# Patient Record
Sex: FEMALE | Race: WHITE | NOT HISPANIC OR LATINO | Employment: OTHER | URBAN - METROPOLITAN AREA
[De-identification: names, ages, dates, MRNs, and addresses within clinical notes are randomized per-mention and may not be internally consistent; named-entity substitution may affect disease eponyms.]

---

## 2017-11-29 ENCOUNTER — RESULTS ENCOUNTER (OUTPATIENT)
Dept: FAMILY MEDICINE CLINIC | Facility: CLINIC | Age: 67
End: 2017-11-29

## 2017-11-29 ENCOUNTER — LAB (OUTPATIENT)
Dept: LAB | Facility: HOSPITAL | Age: 67
End: 2017-11-29

## 2017-11-29 ENCOUNTER — OFFICE VISIT (OUTPATIENT)
Dept: FAMILY MEDICINE CLINIC | Facility: CLINIC | Age: 67
End: 2017-11-29

## 2017-11-29 VITALS
WEIGHT: 162.4 LBS | BODY MASS INDEX: 24.61 KG/M2 | SYSTOLIC BLOOD PRESSURE: 124 MMHG | OXYGEN SATURATION: 96 % | HEIGHT: 68 IN | TEMPERATURE: 97.8 F | HEART RATE: 80 BPM | DIASTOLIC BLOOD PRESSURE: 78 MMHG

## 2017-11-29 DIAGNOSIS — E55.9 VITAMIN D DEFICIENCY: ICD-10-CM

## 2017-11-29 DIAGNOSIS — R73.09 ELEVATED GLUCOSE: Primary | ICD-10-CM

## 2017-11-29 DIAGNOSIS — M25.542 ARTHRALGIA OF BOTH HANDS: ICD-10-CM

## 2017-11-29 DIAGNOSIS — L81.9 PIGMENTED SKIN LESIONS: ICD-10-CM

## 2017-11-29 DIAGNOSIS — Z12.11 SCREENING FOR COLON CANCER: ICD-10-CM

## 2017-11-29 DIAGNOSIS — M25.541 ARTHRALGIA OF BOTH HANDS: ICD-10-CM

## 2017-11-29 DIAGNOSIS — Z00.00 MEDICARE ANNUAL WELLNESS VISIT, SUBSEQUENT: Primary | ICD-10-CM

## 2017-11-29 DIAGNOSIS — Z00.00 MEDICARE ANNUAL WELLNESS VISIT, SUBSEQUENT: ICD-10-CM

## 2017-11-29 DIAGNOSIS — G47.00 INSOMNIA, UNSPECIFIED TYPE: ICD-10-CM

## 2017-11-29 LAB
25(OH)D3 SERPL-MCNC: 35.7 NG/ML
ALBUMIN SERPL-MCNC: 4.7 G/DL (ref 3.2–4.8)
ALBUMIN/GLOB SERPL: 2 G/DL (ref 1.5–2.5)
ALP SERPL-CCNC: 77 U/L (ref 25–100)
ALT SERPL W P-5'-P-CCNC: 23 U/L (ref 7–40)
ANION GAP SERPL CALCULATED.3IONS-SCNC: 8 MMOL/L (ref 3–11)
ARTICHOKE IGE QN: 159 MG/DL (ref 0–130)
AST SERPL-CCNC: 18 U/L (ref 0–33)
BACTERIA UR QL AUTO: ABNORMAL /HPF
BASOPHILS # BLD AUTO: 0.02 10*3/MM3 (ref 0–0.2)
BASOPHILS NFR BLD AUTO: 0.2 % (ref 0–1)
BILIRUB SERPL-MCNC: 1 MG/DL (ref 0.3–1.2)
BILIRUB UR QL STRIP: NEGATIVE
BUN BLD-MCNC: 14 MG/DL (ref 9–23)
BUN/CREAT SERPL: 17.5 (ref 7–25)
CALCIUM SPEC-SCNC: 9.8 MG/DL (ref 8.7–10.4)
CHLORIDE SERPL-SCNC: 101 MMOL/L (ref 99–109)
CHOLEST SERPL-MCNC: 222 MG/DL (ref 0–200)
CLARITY UR: ABNORMAL
CO2 SERPL-SCNC: 29 MMOL/L (ref 20–31)
COLOR UR: YELLOW
CREAT BLD-MCNC: 0.8 MG/DL (ref 0.6–1.3)
CRP SERPL-MCNC: 0.67 MG/DL (ref 0–1)
DEPRECATED RDW RBC AUTO: 43.2 FL (ref 37–54)
EOSINOPHIL # BLD AUTO: 0.08 10*3/MM3 (ref 0–0.3)
EOSINOPHIL NFR BLD AUTO: 0.8 % (ref 0–3)
ERYTHROCYTE [DISTWIDTH] IN BLOOD BY AUTOMATED COUNT: 12.9 % (ref 11.3–14.5)
GFR SERPL CREATININE-BSD FRML MDRD: 71 ML/MIN/1.73
GLOBULIN UR ELPH-MCNC: 2.3 GM/DL
GLUCOSE BLD-MCNC: 113 MG/DL (ref 70–100)
GLUCOSE UR STRIP-MCNC: NEGATIVE MG/DL
HBA1C MFR BLD: 6.4 % (ref 4.8–5.6)
HCT VFR BLD AUTO: 44.5 % (ref 34.5–44)
HCV AB SER DONR QL: NORMAL
HDLC SERPL-MCNC: 58 MG/DL (ref 40–60)
HEMOCCULT STL QL IA: NEGATIVE
HGB BLD-MCNC: 14.7 G/DL (ref 11.5–15.5)
HGB UR QL STRIP.AUTO: NEGATIVE
HYALINE CASTS UR QL AUTO: ABNORMAL /LPF
IMM GRANULOCYTES # BLD: 0.02 10*3/MM3 (ref 0–0.03)
IMM GRANULOCYTES NFR BLD: 0.2 % (ref 0–0.6)
KETONES UR QL STRIP: NEGATIVE
LEUKOCYTE ESTERASE UR QL STRIP.AUTO: NEGATIVE
LYMPHOCYTES # BLD AUTO: 2.46 10*3/MM3 (ref 0.6–4.8)
LYMPHOCYTES NFR BLD AUTO: 25.4 % (ref 24–44)
MCH RBC QN AUTO: 30.2 PG (ref 27–31)
MCHC RBC AUTO-ENTMCNC: 33 G/DL (ref 32–36)
MCV RBC AUTO: 91.6 FL (ref 80–99)
MONOCYTES # BLD AUTO: 0.74 10*3/MM3 (ref 0–1)
MONOCYTES NFR BLD AUTO: 7.6 % (ref 0–12)
NEUTROPHILS # BLD AUTO: 6.38 10*3/MM3 (ref 1.5–8.3)
NEUTROPHILS NFR BLD AUTO: 65.8 % (ref 41–71)
NITRITE UR QL STRIP: NEGATIVE
PH UR STRIP.AUTO: <=5 [PH] (ref 5–8)
PLATELET # BLD AUTO: 227 10*3/MM3 (ref 150–450)
PMV BLD AUTO: 9.8 FL (ref 6–12)
POTASSIUM BLD-SCNC: 4.7 MMOL/L (ref 3.5–5.5)
PROT SERPL-MCNC: 7 G/DL (ref 5.7–8.2)
PROT UR QL STRIP: NEGATIVE
RBC # BLD AUTO: 4.86 10*6/MM3 (ref 3.89–5.14)
RBC # UR: ABNORMAL /HPF
REF LAB TEST METHOD: ABNORMAL
RHEUMATOID FACT SERPL-ACNC: NEGATIVE [IU]/ML
SODIUM BLD-SCNC: 138 MMOL/L (ref 132–146)
SP GR UR STRIP: 1.02 (ref 1–1.03)
SQUAMOUS #/AREA URNS HPF: ABNORMAL /HPF
TRIGL SERPL-MCNC: 151 MG/DL (ref 0–150)
TSH SERPL DL<=0.05 MIU/L-ACNC: 1.68 MIU/ML (ref 0.35–5.35)
UROBILINOGEN UR QL STRIP: ABNORMAL
WBC NRBC COR # BLD: 9.7 10*3/MM3 (ref 3.5–10.8)
WBC UR QL AUTO: ABNORMAL /HPF

## 2017-11-29 PROCEDURE — 82306 VITAMIN D 25 HYDROXY: CPT

## 2017-11-29 PROCEDURE — 86038 ANTINUCLEAR ANTIBODIES: CPT

## 2017-11-29 PROCEDURE — 86140 C-REACTIVE PROTEIN: CPT

## 2017-11-29 PROCEDURE — 81001 URINALYSIS AUTO W/SCOPE: CPT

## 2017-11-29 PROCEDURE — 80053 COMPREHEN METABOLIC PANEL: CPT

## 2017-11-29 PROCEDURE — 83036 HEMOGLOBIN GLYCOSYLATED A1C: CPT

## 2017-11-29 PROCEDURE — G0439 PPPS, SUBSEQ VISIT: HCPCS | Performed by: NURSE PRACTITIONER

## 2017-11-29 PROCEDURE — 85025 COMPLETE CBC W/AUTO DIFF WBC: CPT

## 2017-11-29 PROCEDURE — 86431 RHEUMATOID FACTOR QUANT: CPT

## 2017-11-29 PROCEDURE — 86803 HEPATITIS C AB TEST: CPT

## 2017-11-29 PROCEDURE — 84443 ASSAY THYROID STIM HORMONE: CPT

## 2017-11-29 PROCEDURE — 80061 LIPID PANEL: CPT

## 2017-11-29 PROCEDURE — 82270 OCCULT BLOOD FECES: CPT | Performed by: NURSE PRACTITIONER

## 2017-11-29 PROCEDURE — 36415 COLL VENOUS BLD VENIPUNCTURE: CPT

## 2017-11-29 RX ORDER — TRAMADOL HYDROCHLORIDE 50 MG/1
50 TABLET ORAL AS NEEDED
COMMUNITY
End: 2020-06-04

## 2017-11-29 RX ORDER — SODIUM PHOSPHATE,MONO-DIBASIC 19G-7G/118
1 ENEMA (ML) RECTAL DAILY
COMMUNITY
End: 2018-07-13

## 2017-11-29 RX ORDER — SULINDAC 150 MG/1
150 TABLET ORAL AS NEEDED
COMMUNITY
End: 2019-01-15

## 2017-11-29 RX ORDER — CYCLOBENZAPRINE HCL 10 MG
10 TABLET ORAL AS NEEDED
COMMUNITY
End: 2020-06-04

## 2017-11-29 RX ORDER — ZOLPIDEM TARTRATE 10 MG/1
10 TABLET ORAL NIGHTLY PRN
Qty: 30 TABLET | Refills: 0 | Status: SHIPPED | OUTPATIENT
Start: 2017-11-29 | End: 2017-12-22 | Stop reason: SDUPTHER

## 2017-11-29 NOTE — PROGRESS NOTES
QUICK REFERENCE INFORMATION:  The ABCs of the Annual Wellness Visit    Subsequent Medicare Wellness Visit    HEALTH RISK ASSESSMENT    1950    Recent Hospitalizations:  No hospitalization(s) within the last year..        Current Medical Providers:  Patient Care Team:  YANDEL Pineda as PCP - General (Nurse Practitioner)  Camden Franco MD (Neurosurgery)  Jason Pino MD as Consulting Physician (Pulmonary Disease)  Fady Lane MD as Consulting Physician (Otolaryngology)        Smoking Status:  History   Smoking Status   • Never Smoker   Smokeless Tobacco   • Never Used       Alcohol Consumption:  History   Alcohol Use   • Yes     Comment: 2-3 drinks a month       Depression Screen:   PHQ-2/PHQ-9 Depression Screening 11/29/2017   Little interest or pleasure in doing things 0   Feeling down, depressed, or hopeless 1   Total Score 1    Patient states the depression is not debilitating, is an ongoing problem, denies need for medication, is not interested in therapy, she stays active.    Health Habits and Functional and Cognitive Screening:  Functional & Cognitive Status 11/29/2017   Do you have difficulty preparing food and eating? No   Do you have difficulty bathing yourself, getting dressed or grooming yourself? No   Do you have difficulty using the toilet? No   Do you have difficulty moving around from place to place? No   Do you have trouble with steps or getting out of a bed or a chair? No   In the past year have you fallen or experienced a near fall? Yes   Current Diet Well Balanced Diet   Dental Exam Up to date   Eye Exam Up to date   Exercise (times per week) 5 times per week   Current Exercise Activities Include Swimming   Do you need help using the phone?  No   Are you deaf or do you have serious difficulty hearing?  Yes   Do you need help with transportation? No   Do you need help shopping? No   Do you need help preparing meals?  No   Do you need help with housework?   No   Do you need help with laundry? No   Do you need help taking your medications? No   Do you need help managing money? No   Have you felt unusual stress, anger or loneliness in the last month? No   Who do you live with? Spouse   If you need help, do you have trouble finding someone available to you? No   Have you been bothered in the last four weeks by sexual problems? No   Do you have difficulty concentrating, remembering or making decisions? No           Does the patient have evidence of cognitive impairment? No    Aspirin use counseling: Does not need ASA (and currently is not on it)      Recent Lab Results:  CMP:     Lipid Panel:     HbA1c:       Visual Acuity:  No exam data present    Age-appropriate Screening Schedule:  Refer to the list below for future screening recommendations based on patient's age, sex and/or medical conditions. Orders for these recommended tests are listed in the plan section. The patient has been provided with a written plan.    Health Maintenance   Topic Date Due   • COLONOSCOPY  11/29/2017   • MAMMOGRAM  01/01/2018   • TDAP/TD VACCINES (2 - Td) 01/01/2024   • INFLUENZA VACCINE  Completed   • PNEUMOCOCCAL VACCINES (65+ LOW/MEDIUM RISK)  Completed   • ZOSTER VACCINE  Completed        Subjective   History of Present Illness    Dixie Cervantes is a 67 y.o. female who presents for an Subsequent Wellness Visit.  Patient does not want a colonoscopy scheduled today, will be in [place] until March, will call back at that time, she is willing to do a cologuard. She does have some skin lesions she would like to have checked.  Patient has been taking ambien for 5-6 years off and on, has been taking more regularly over past year, needs a refill today.  Patient has noticed an increase in finger joint and hand pain, has a family history of rheumatoid arthritis and would like to be screened because it is interfering with her ability to adelina.   The following portions of the patient's history were  reviewed and updated as appropriate: allergies, current medications, past family history, past medical history, past social history, past surgical history and problem list.    Outpatient Medications Prior to Visit   Medication Sig Dispense Refill   • zolpidem (AMBIEN) 10 MG tablet        No facility-administered medications prior to visit.        There is no problem list on file for this patient.      Advance Care Planning:  has an advance directive - a copy HAS NOT been provided. Have asked the patient to send this to us to add to record.    Identification of Risk Factors:  Risk factors include: depression and polypharmacy.    Review of Systems   Constitutional: Negative for activity change, appetite change, chills, diaphoresis, fatigue, fever and unexpected weight change.   HENT: Positive for ear pain (right ear) and hearing loss (slight). Negative for sore throat and tinnitus.    Eyes: Negative for photophobia, pain, discharge, redness and visual disturbance.   Respiratory: Positive for cough (chronic). Negative for shortness of breath and wheezing.    Cardiovascular: Negative for chest pain, palpitations and leg swelling.   Gastrointestinal: Negative for abdominal pain, blood in stool, constipation, diarrhea, nausea and vomiting.   Endocrine: Negative for cold intolerance, heat intolerance, polydipsia and polyuria.   Genitourinary: Positive for frequency and urgency. Negative for difficulty urinating, dysuria, vaginal bleeding, vaginal discharge and vaginal pain.        Feels like urine stream has weakened   Musculoskeletal: Positive for arthralgias and back pain (chronic). Negative for joint swelling, neck pain and neck stiffness.   Skin: Negative for pallor, rash and wound.        Bridge of nose, left cheek brown macules   Allergic/Immunologic: Positive for environmental allergies.   Neurological: Positive for dizziness (when ear problems, uses meclizine), light-headedness and numbness (occ in left 5 th finger  tip, has seen specialist). Negative for tremors, syncope, weakness and headaches.   Psychiatric/Behavioral: Positive for sleep disturbance. Negative for dysphoric mood, self-injury and suicidal ideas. The patient is nervous/anxious.        Compared to one year ago, the patient feels her physical health is worse.  Compared to one year ago, the patient feels her mental health is the same.    Objective     Physical Exam   Constitutional: She is oriented to person, place, and time. She appears well-developed and well-nourished.   HENT:   Head: Normocephalic and atraumatic.   Right Ear: External ear normal. Tympanic membrane is not erythematous and not bulging. A middle ear effusion is present.   Left Ear: External ear normal. Tympanic membrane is not erythematous and not bulging. A middle ear effusion is present.   Nose: Nose normal.   Mouth/Throat: Uvula is midline and oropharynx is clear and moist. Normal dentition. No oropharyngeal exudate.   Eyes: Conjunctivae and EOM are normal. Pupils are equal, round, and reactive to light. Right eye exhibits no discharge. Left eye exhibits no discharge. No scleral icterus.   Neck: Normal range of motion. Neck supple. No JVD present. No tracheal deviation present. No thyromegaly present.   Cardiovascular: Normal rate, regular rhythm, normal heart sounds and intact distal pulses.  Exam reveals no friction rub.    No murmur heard.  No carotid bruit   Pulmonary/Chest: Effort normal and breath sounds normal. No stridor. No respiratory distress. She has no wheezes. She has no rales. Right breast exhibits no inverted nipple, no mass, no nipple discharge, no skin change and no tenderness. Left breast exhibits no inverted nipple, no mass, no nipple discharge, no skin change and no tenderness. Breasts are symmetrical.   Abdominal: Soft. Bowel sounds are normal. She exhibits no distension and no mass. There is no rebound and no guarding. No hernia.   No aortic or renal artery bruit  "  Genitourinary:   Genitourinary Comments: Pelvic declined   Musculoskeletal: She exhibits no edema, tenderness or deformity.   Normal ROM of major joints   Lymphadenopathy:     She has no cervical adenopathy.   Neurological: She is alert and oriented to person, place, and time. She has normal reflexes. No cranial nerve deficit.   Skin: Skin is warm and dry. No rash noted. No pallor.   brown macule on bridge of nose(0.6 cm) and 0.4 cm on left cheek, 0.5 cm flesh colored papule on right shoulder   Vitals reviewed.      Vitals:    11/29/17 0821   BP: 124/78   Pulse: 80   Temp: 97.8 °F (36.6 °C)   TempSrc: Temporal Artery    SpO2: 96%   Weight: 162 lb 6.4 oz (73.7 kg)   Height: 68\" (172.7 cm)   PainSc: 0-No pain       Body mass index is 24.69 kg/(m^2).  Discussed the patient's BMI with her. The BMI is in the acceptable range.    Assessment/Plan   Patient Self-Management and Personalized Health Advice  The patient has been provided with information about: diet, exercise and mental health concerns and preventive services including:   · Colorectal cancer screening, fecal occult blood test, Colorectal cancer screening, cologuard test ordered.    Visit Diagnoses:    ICD-10-CM ICD-9-CM   1. Medicare annual wellness visit, subsequent Z00.00 V70.0   2. Arthralgia of both hands M25.541 719.44    M25.542    3. Vitamin D deficiency E55.9 268.9   4. Pigmented skin lesions L81.9 709.00   5. Insomnia, unspecified type G47.00 780.52   6. Screening for colon cancer Z12.11 V76.51       No orders of the defined types were placed in this encounter.  I will contact patient regarding test results and provide instructions regarding any necessary changes in plan of care.Nutrition and activity goals reviewed including: mainly water to drink, limit white flour/processed sugar, and processed foods, choose fresh fruits, vegetables, fish, lean meats,high fiber carbs, exercise  working toward 150 mins cardio per week, weight training " 2x/week.  Patient to notify if urinary symptoms worsen, may need referral to urology for evaluation    Outpatient Encounter Prescriptions as of 11/29/2017   Medication Sig Dispense Refill   • cyclobenzaprine (FLEXERIL) 10 MG tablet Take 10 mg by mouth As Needed for Muscle Spasms.     • glucosamine-chondroitin 500-400 MG capsule capsule Take 1 capsule by mouth Daily.     • Multiple Vitamins-Minerals (MULTI VITAMIN/MINERALS) tablet Take 1 tablet by mouth.     • sulindac (CLINORIL) 150 MG tablet Take 150 mg by mouth As Needed for Mild Pain .     • traMADol (ULTRAM) 50 MG tablet Take 50 mg by mouth As Needed for Moderate Pain .     • zolpidem (AMBIEN) 10 MG tablet        No facility-administered encounter medications on file as of 11/29/2017.        Reviewed use of high risk medication in the elderly: yes  Reviewed for potential of harmful drug interactions in the elderly: yes    Follow Up:  No Follow-up on file.     An After Visit Summary and PPPS with all of these plans were given to the patient.

## 2017-11-30 LAB — ANA SER QL: NEGATIVE

## 2017-12-18 ENCOUNTER — TELEPHONE (OUTPATIENT)
Dept: FAMILY MEDICINE CLINIC | Facility: CLINIC | Age: 67
End: 2017-12-18

## 2017-12-22 ENCOUNTER — OFFICE VISIT (OUTPATIENT)
Dept: FAMILY MEDICINE CLINIC | Facility: CLINIC | Age: 67
End: 2017-12-22

## 2017-12-22 VITALS
HEART RATE: 94 BPM | SYSTOLIC BLOOD PRESSURE: 118 MMHG | BODY MASS INDEX: 24.55 KG/M2 | HEIGHT: 68 IN | TEMPERATURE: 97.8 F | OXYGEN SATURATION: 98 % | WEIGHT: 162 LBS | DIASTOLIC BLOOD PRESSURE: 78 MMHG

## 2017-12-22 DIAGNOSIS — G47.00 INSOMNIA, UNSPECIFIED TYPE: ICD-10-CM

## 2017-12-22 DIAGNOSIS — R73.03 PREDIABETES: Primary | ICD-10-CM

## 2017-12-22 PROCEDURE — 99213 OFFICE O/P EST LOW 20 MIN: CPT | Performed by: NURSE PRACTITIONER

## 2017-12-22 RX ORDER — ZOLPIDEM TARTRATE 10 MG/1
10 TABLET ORAL NIGHTLY PRN
Qty: 30 TABLET | Refills: 1 | Status: SHIPPED | OUTPATIENT
Start: 2017-12-22 | End: 2018-01-21

## 2017-12-22 NOTE — PATIENT INSTRUCTIONS
Cholesterol  Cholesterol is a fat. Your body needs a small amount of cholesterol. Cholesterol may build up in your blood vessels. This increases your chance of having a heart attack or stroke.  You cannot feel your cholesterol levels. The only way to know your cholesterol level is high is with a blood test. Keep your test results. Work with your doctor to keep your cholesterol at a good level.  WHAT DO THE TEST RESULTS MEAN?  · Total cholesterol is how much cholesterol is in your blood.  · LDL is bad cholesterol. This is the type that can build up. You want LDL to be low.  · HDL is good cholesterol. It cleans your blood vessels and carries LDL away. You want HDL to be high.  · Triglycerides are fat that the body can burn for energy or store.  WHAT ARE GOOD LEVELS OF CHOLESTEROL?  · Total cholesterol below 200.  · LDL below 100 for people at risk. Below 70 for those at very high risk.  · HDL above 50 is good. Above 60 is best.  · Triglycerides below 150.  HOW CAN I LOWER MY CHOLESTEROL?  · Diet. Follow your diet programs as told by your doctor.    Choose fish, white meat chicken, roasted turkey, or baked turkey. Try not to eat red meat, fried foods, or processed meats such as sausage and lunch meats.    Eat lots of fresh fruits and vegetables.    Choose whole grains, beans, pasta, potatoes, and cereals.    Use only small amounts of olive, corn, or canola oils.    Try not to eat butter, mayonnaise, shortening, or palm kernel oils.    Try not to eat foods with trans fats.    Drink skim or nonfat milk. Eat low-fat or nonfat yogurt and cheeses. Try not to drink whole milk or cream. Try not to eat ice cream, egg yolks, and full-fat cheeses.    Healthy desserts include kevin food cake, maurizio snaps, animal crackers, hard candy, popsicles, and low-fat or nonfat frozen yogurt. Try not to eat pastries, cakes, pies, and cookies.  · Exercise. Follow your exercise programs as told by your doctor.    Be more active. You can try  "gardening, walking, or taking the stairs. Ask your doctor about how you can be more active.  · Medicine. Take medicine as told by your doctor.     This information is not intended to replace advice given to you by your health care provider. Make sure you discuss any questions you have with your health care provider.     Document Released: 03/16/2010 Document Revised: 01/08/2016 Document Reviewed: 10/01/2014  [employer] Interactive Patient Education ©2017 Elsevier Inc.  Food Choices to Lower Your Triglycerides  Triglycerides are a type of fat in your blood. High levels of triglycerides can increase the risk of heart disease and stroke. If your triglyceride levels are high, the foods you eat and your eating habits are very important. Choosing the right foods can help lower your triglycerides.   WHAT GENERAL GUIDELINES DO I NEED TO FOLLOW?  · Lose weight if you are overweight.    · Limit or avoid alcohol.    · Fill one half of your plate with vegetables and green salads.    · Limit fruit to two servings a day. Choose fruit instead of juice.    · Make one fourth of your plate whole grains. Look for the word \"whole\" as the first word in the ingredient list.  · Fill one fourth of your plate with lean protein foods.  · Enjoy fatty fish (such as salmon, mackerel, sardines, and tuna) three times a week.    · Choose healthy fats.    · Limit foods high in starch and sugar.  · Eat more home-cooked food and less restaurant, buffet, and fast food.  · Limit fried foods.  · Cook foods using methods other than frying.  · Limit saturated fats.  · Check ingredient lists to avoid foods with partially hydrogenated oils (trans fats) in them.  WHAT FOODS CAN I EAT?   Grains  Whole grains, such as whole wheat or whole grain breads, crackers, cereals, and pasta. Unsweetened oatmeal, bulgur, barley, quinoa, or brown rice. Corn or whole wheat flour tortillas.   Vegetables  Fresh or frozen vegetables (raw, steamed, roasted, or grilled). Green " salads.  Fruits  All fresh, canned (in natural juice), or frozen fruits.  Meat and Other Protein Products  Ground beef (85% or leaner), grass-fed beef, or beef trimmed of fat. Skinless chicken or turkey. Ground chicken or turkey. Pork trimmed of fat. All fish and seafood. Eggs. Dried beans, peas, or lentils. Unsalted nuts or seeds. Unsalted canned or dry beans.  Dairy  Low-fat dairy products, such as skim or 1% milk, 2% or reduced-fat cheeses, low-fat ricotta or cottage cheese, or plain low-fat yogurt.  Fats and Oils  Tub margarines without trans fats. Light or reduced-fat mayonnaise and salad dressings. Avocado. Safflower, olive, or canola oils. Natural peanut or almond butter.  The items listed above may not be a complete list of recommended foods or beverages. Contact your dietitian for more options.  WHAT FOODS ARE NOT RECOMMENDED?   Grains  White bread. White pasta. White rice. Cornbread. Bagels, pastries, and croissants. Crackers that contain trans fat.  Vegetables  White potatoes. Corn. Creamed or fried vegetables. Vegetables in a cheese sauce.  Fruits  Dried fruits. Canned fruit in light or heavy syrup. Fruit juice.  Meat and Other Protein Products  Fatty cuts of meat. Ribs, chicken wings, ibarra, sausage, bologna, salami, chitterlings, fatback, hot dogs, bratwurst, and packaged luncheon meats.  Dairy  Whole or 2% milk, cream, half-and-half, and cream cheese. Whole-fat or sweetened yogurt. Full-fat cheeses. Nondairy creamers and whipped toppings. Processed cheese, cheese spreads, or cheese curds.  Sweets and Desserts  Corn syrup, sugars, honey, and molasses. Candy. Jam and jelly. Syrup. Sweetened cereals. Cookies, pies, cakes, donuts, muffins, and ice cream.  Fats and Oils  Butter, stick margarine, lard, shortening, ghee, or ibarra fat. Coconut, palm kernel, or palm oils.  Beverages  Alcohol. Sweetened drinks (such as sodas, lemonade, and fruit drinks or punches).  The items listed above may not be a complete  list of foods and beverages to avoid. Contact your dietitian for more information.     This information is not intended to replace advice given to you by your health care provider. Make sure you discuss any questions you have with your health care provider.     Document Released: 10/05/2005 Document Revised: 01/08/2016 Document Reviewed: 10/22/2014  [employer] Interactive Patient Education ©2017 [employer]vier Inc.  Prediabetes Eating Plan  Prediabetes--also called impaired glucose tolerance or impaired fasting glucose--is a condition that causes blood sugar (blood glucose) levels to be higher than normal. Following a healthy diet can help to keep prediabetes under control. It can also help to lower the risk of type 2 diabetes and heart disease, which are increased in people who have prediabetes. Along with regular exercise, a healthy diet:  · Promotes weight loss.  · Helps to control blood sugar levels.  · Helps to improve the way that the body uses insulin.  WHAT DO I NEED TO KNOW ABOUT THIS EATING PLAN?  · Use the glycemic index (GI) to plan your meals. The index tells you how quickly a food will raise your blood sugar. Choose low-GI foods. These foods take a longer time to raise blood sugar.  · Pay close attention to the amount of carbohydrates in the food that you eat. Carbohydrates increase blood sugar levels.  · Keep track of how many calories you take in. Eating the right amount of calories will help you to achieve a healthy weight. Losing about 7 percent of your starting weight can help to prevent type 2 diabetes.  · You may want to follow a Mediterranean diet. This diet includes a lot of vegetables, lean meats or fish, whole grains, fruits, and healthy oils and fats.  WHAT FOODS CAN I EAT?  Grains  Whole grains, such as whole-wheat or whole-grain breads, crackers, cereals, and pasta. Unsweetened oatmeal. Bulgur. Barley. Quinoa. Brown rice. Corn or whole-wheat flour tortillas or taco shells.  Vegetables  Lettuce.  Spinach. Peas. Beets. Cauliflower. Cabbage. Broccoli. Carrots. Tomatoes. Squash. Eggplant. Herbs. Peppers. Onions. Cucumbers. [place] sprouts.  Fruits  Berries. Bananas. Apples. Oranges. Grapes. Papaya. [place]. Pomegranate. Kiwi. Grapefruit. Cherries.  Meats and Other Protein Sources  Seafood. Lean meats, such as chicken and turkey or lean cuts of pork and beef. Tofu. Eggs. Nuts. Beans.  Dairy  Low-fat or fat-free dairy products, such as yogurt, cottage cheese, and cheese.  Beverages  Water. Tea. Coffee. Sugar-free or diet soda. [place] water. Milk. Milk alternatives, such as soy or almond milk.  Condiments  Mustard. Relish. Low-fat, low-sugar ketchup. Low-fat, low-sugar barbecue sauce. Low-fat or fat-free mayonnaise.  Sweets and Desserts  Sugar-free or low-fat pudding. Sugar-free or low-fat ice cream and other frozen treats.  Fats and Oils  Avocado. Walnuts. Olive oil.  The items listed above may not be a complete list of recommended foods or beverages. Contact your dietitian for more options.   WHAT FOODS ARE NOT RECOMMENDED?  Grains  Refined white flour and flour products, such as bread, pasta, snack foods, and cereals.  Beverages  Sweetened drinks, such as sweet iced tea and soda.  Sweets and Desserts  Baked goods, such as cake, cupcakes, pastries, cookies, and cheesecake.  The items listed above may not be a complete list of foods and beverages to avoid. Contact your dietitian for more information.     This information is not intended to replace advice given to you by your health care provider. Make sure you discuss any questions you have with your health care provider.     Document Released: 05/03/2016 Document Reviewed: 05/03/2016  [employer] Interactive Patient Education ©2017 [employer].    Prediabetes  WHAT IS PREDIABETES?  Prediabetes is the condition of having a blood sugar (blood glucose) level that is higher than it should be, but not high enough for you to be diagnosed with type 2 diabetes. Having  prediabetes puts you at risk for developing type 2 diabetes (type 2 diabetes mellitus). Prediabetes may be called impaired glucose tolerance or impaired fasting glucose.  Prediabetes usually does not cause symptoms. Your health care provider can diagnose this condition with blood tests. You may be tested for prediabetes if you are overweight and if you have at least one other risk factor for prediabetes.  Risk factors for prediabetes include:  · Having a family member with type 2 diabetes.  · Being overweight or obese.  · Being older than age 45.  · Being of American-[place], -American, /, or / descent.  · Having an inactive (sedentary) lifestyle.  · Having a history of gestational diabetes or polycystic ovarian syndrome (PCOS).  · Having low levels of good cholesterol (HDL-C) or high levels of blood fats (triglycerides).  · Having high blood pressure.  WHAT IS BLOOD GLUCOSE AND HOW IS BLOOD GLUCOSE MEASURED?  Blood glucose refers to the amount of glucose in your bloodstream. Glucose comes from eating foods that contain sugars and starches (carbohydrates) that the body breaks down into glucose. Your blood glucose level may be measured in mg/dL (milligrams per deciliter) or mmol/L (millimoles per liter). Your blood glucose may be checked with one or more of the following blood tests:  · A fasting blood glucose (FBG) test. You will not be allowed to eat (you will fast) for at least 8 hours before a blood sample is taken.    A normal range for FBG is  mg/dl (3.9-5.6 mmol/L).  · An A1c (hemoglobin A1c) blood test. This test provides information about blood glucose control over the previous 2-3 months.  · An oral glucose tolerance test (OGTT). This test measures your blood glucose twice:    After fasting. This is your baseline level.    Two hours after you drink a beverage that contains glucose.  You may be diagnosed with prediabetes:  · If your FBG is 100-125 mg/dL (5.6-6.9  mmol/L).  · If your A1c level is 5.7-6.4%.  · If your OGGT result is 140-199 mg/dL (7.8-11 mmol/L).  These blood tests may be repeated to confirm your diagnosis.  WHAT HAPPENS IF BLOOD GLUCOSE IS TOO HIGH?  The pancreas produces a hormone (insulin) that helps move glucose from the bloodstream into cells. When cells in the body do not respond properly to insulin that the body makes (insulin resistance), excess glucose builds up in the blood instead of going into cells. As a result, high blood glucose (hyperglycemia) can develop, which can cause many complications. This is a symptom of prediabetes.  WHAT CAN HAPPEN IF BLOOD GLUCOSE STAYS HIGHER THAN NORMAL FOR A LONG TIME?  Having high blood glucose for a long time is dangerous. Too much glucose in your blood can damage your nerves and blood vessels. Long-term damage can lead to complications from diabetes, which may include:  · Heart disease.  · Stroke.  · Blindness.  · Kidney disease.  · Depression.  · Poor circulation in the feet and legs, which could lead to surgical removal (amputation) in severe cases.  HOW CAN PREDIABETES BE PREVENTED FROM TURNING INTO TYPE 2 DIABETES?  To help prevent type 2 diabetes, take the following actions:  · Be physically active.    Do moderate-intensity physical activity for at least 30 minutes on at least 5 days of the week, or as much as told by your health care provider. This could be brisk walking, biking, or water aerobics.    Ask your health care provider what activities are safe for you. A mix of physical activities may be best, such as walking, swimming, cycling, and strength training.  · Lose weight as told by your health care provider.    Losing 5-7% of your body weight can reverse insulin resistance.    Your health care provider can determine how much weight loss is best for you and can help you lose weight safely.  · Follow a healthy meal plan. This includes eating lean proteins, complex carbohydrates, fresh fruits and  vegetables, low-fat dairy products, and healthy fats.    Follow instructions from your health care provider about eating or drinking restrictions.    Make an appointment to see a diet and nutrition specialist (registered dietitian) to help you create a healthy eating plan that is right for you.  · Do not smoke or use any tobacco products, such as cigarettes, chewing tobacco, and e-cigarettes. If you need help quitting, ask your health care provider.  · Take over-the-counter and prescription medicines as told by your health care provider. You may be prescribed medicines that help lower the risk of type 2 diabetes.     This information is not intended to replace advice given to you by your health care provider. Make sure you discuss any questions you have with your health care provider.     Document Released: 04/10/2017 Document Reviewed: 02/07/2017  Elsevier Interactive Patient Education ©2017 Elsevier Inc.

## 2017-12-22 NOTE — PROGRESS NOTES
Subjective   Dixie Cervantes is a 67 y.o. female.   Chief Complaint   Patient presents with   • insomnia       History of Present Illness   Patient is here for refills of Ambien, is leaving for [place] for the winter,and will need a refill.  Patient also wants to go over lab results, had a negative cologuard, but after reading the kit information is concerned about the 13% false negative results and wants to have this repeated next year if possible.Cholesterol and triglycerides are elevated, she does not want to start a statin, A1c was 6.4, does not want to go to Diabetes education, will try to improve diet and follow up in a few months when she gets back from [place].  Patient does walk the treadmill and swim for exercise, states she had eaten poorly the months prior to blood work.  The following portions of the patient's history were reviewed and updated as appropriate: allergies, current medications, past family history, past medical history, past social history, past surgical history and problem list.    Review of Systems   Constitutional: Negative for chills and fever.   Eyes: Negative for visual disturbance.   Respiratory: Negative for shortness of breath and wheezing.    Cardiovascular: Negative for chest pain and palpitations.   Endocrine: Negative for polydipsia and polyuria.   Genitourinary: Negative for difficulty urinating and dysuria.   Allergic/Immunologic: Positive for environmental allergies.   Neurological: Positive for dizziness (occ) and headaches.   Psychiatric/Behavioral: Positive for sleep disturbance. The patient is nervous/anxious.        Objective   Physical Exam   Constitutional: She is oriented to person, place, and time. She appears well-developed and well-nourished. No distress.   HENT:   Head: Normocephalic and atraumatic.   Right Ear: External ear normal.   Left Ear: External ear normal.   Nose: Nose normal.   Mouth/Throat: Oropharynx is clear and moist. No oropharyngeal exudate.   Eyes:  Conjunctivae are normal. No scleral icterus.   Cardiovascular: Normal rate, regular rhythm, normal heart sounds and intact distal pulses.    No murmur heard.  Pulmonary/Chest: Effort normal and breath sounds normal. No respiratory distress. She has no wheezes.   Neurological: She is alert and oriented to person, place, and time.   Skin: Skin is warm and dry. She is not diaphoretic.   Psychiatric: She has a normal mood and affect. Her behavior is normal. Judgment and thought content normal.   Vitals reviewed.      Assessment/Plan   Dixie was seen today for hypertension.    Diagnoses and all orders for this visit:    Prediabetes    Insomnia, unspecified type  -     zolpidem (AMBIEN) 10 MG tablet; Take 1 tablet by mouth At Night As Needed for Sleep for up to 30 days.        I provided patient with printed material about prediabetes and diabetes prevention, discussed the importance of diet and exercise, patient will follow up in a few months, will recheck A1c, I did again recommend a diabetes education referral when she gets back.   Nitin Lan refilled Ambien, MADY reviewed, patient signed controlled substance agreement last month.  I discussed patient's lab results with her,  Cologuard is a fairly new test, current repeat recommendations are every 3 years, but intervals are uncertain per the literature.  Patient was encouraged to keep me informed of any acute changes, lack of improvement, or any new concerning symptoms.Patient voiced understanding of all instructions and denied further questions.

## 2018-01-15 ENCOUNTER — TELEPHONE (OUTPATIENT)
Dept: FAMILY MEDICINE CLINIC | Facility: CLINIC | Age: 68
End: 2018-01-15

## 2018-01-15 NOTE — TELEPHONE ENCOUNTER
ZOLPIDEM 10 MG TAKES 1 PER DAY FOR SLEEP, 30 DAY SUPPLY WITH 2 REFILLS    PHARMACY NEEDS TO VERIFY THIS SCRIPT AS IT WAS DATED 12/22/17    [facility] [place]

## 2018-03-23 ENCOUNTER — TELEPHONE (OUTPATIENT)
Dept: FAMILY MEDICINE CLINIC | Facility: CLINIC | Age: 68
End: 2018-03-23

## 2018-03-23 DIAGNOSIS — E78.49 OTHER HYPERLIPIDEMIA: Primary | ICD-10-CM

## 2018-03-23 DIAGNOSIS — R73.09 ELEVATED GLUCOSE: ICD-10-CM

## 2018-03-23 NOTE — TELEPHONE ENCOUNTER
"PT WOULD LIKE TO HAVE LABS PUT IN SYSTEM BEFORE HER APPT ON 4-9-18 AT 9:45. PER PT SHE IS TO RE DO SOME OF HER LABS . PT DIDN'T KNOW WHAT LABS [facility] WANTED HER TO REPEAT. PLEASE CALL PT WHEN DONE. PT JUST DOESN\"T WANT TO COME IN FOR APPT AN [facility] NOT HAVE LAB RESULTS.  "

## 2018-03-27 ENCOUNTER — LAB (OUTPATIENT)
Dept: LAB | Facility: HOSPITAL | Age: 68
End: 2018-03-27

## 2018-03-27 DIAGNOSIS — R73.09 ELEVATED GLUCOSE: ICD-10-CM

## 2018-03-27 DIAGNOSIS — E78.49 OTHER HYPERLIPIDEMIA: ICD-10-CM

## 2018-03-27 LAB
ALBUMIN SERPL-MCNC: 4.4 G/DL (ref 3.2–4.8)
ALBUMIN/GLOB SERPL: 1.8 G/DL (ref 1.5–2.5)
ALP SERPL-CCNC: 76 U/L (ref 25–100)
ALT SERPL W P-5'-P-CCNC: 17 U/L (ref 7–40)
ANION GAP SERPL CALCULATED.3IONS-SCNC: 6 MMOL/L (ref 3–11)
ARTICHOKE IGE QN: 148 MG/DL (ref 0–130)
AST SERPL-CCNC: 17 U/L (ref 0–33)
BILIRUB SERPL-MCNC: 0.8 MG/DL (ref 0.3–1.2)
BUN BLD-MCNC: 20 MG/DL (ref 9–23)
BUN/CREAT SERPL: 22.2 (ref 7–25)
CALCIUM SPEC-SCNC: 9.9 MG/DL (ref 8.7–10.4)
CHLORIDE SERPL-SCNC: 108 MMOL/L (ref 99–109)
CHOLEST SERPL-MCNC: 206 MG/DL (ref 0–200)
CO2 SERPL-SCNC: 29 MMOL/L (ref 20–31)
CREAT BLD-MCNC: 0.9 MG/DL (ref 0.6–1.3)
GFR SERPL CREATININE-BSD FRML MDRD: 62 ML/MIN/1.73
GLOBULIN UR ELPH-MCNC: 2.4 GM/DL
GLUCOSE BLD-MCNC: 119 MG/DL (ref 70–100)
HBA1C MFR BLD: 6.4 % (ref 4.8–5.6)
HDLC SERPL-MCNC: 63 MG/DL (ref 40–60)
POTASSIUM BLD-SCNC: 5.1 MMOL/L (ref 3.5–5.5)
PROT SERPL-MCNC: 6.8 G/DL (ref 5.7–8.2)
SODIUM BLD-SCNC: 143 MMOL/L (ref 132–146)
TRIGL SERPL-MCNC: 95 MG/DL (ref 0–150)

## 2018-03-27 PROCEDURE — 83036 HEMOGLOBIN GLYCOSYLATED A1C: CPT

## 2018-03-27 PROCEDURE — 80053 COMPREHEN METABOLIC PANEL: CPT

## 2018-03-27 PROCEDURE — 80061 LIPID PANEL: CPT

## 2018-03-27 PROCEDURE — 36415 COLL VENOUS BLD VENIPUNCTURE: CPT

## 2018-04-12 ENCOUNTER — OFFICE VISIT (OUTPATIENT)
Dept: FAMILY MEDICINE CLINIC | Facility: CLINIC | Age: 68
End: 2018-04-12

## 2018-04-12 ENCOUNTER — TELEPHONE (OUTPATIENT)
Dept: FAMILY MEDICINE CLINIC | Facility: CLINIC | Age: 68
End: 2018-04-12

## 2018-04-12 VITALS
WEIGHT: 161.6 LBS | OXYGEN SATURATION: 98 % | HEIGHT: 68 IN | DIASTOLIC BLOOD PRESSURE: 76 MMHG | HEART RATE: 86 BPM | TEMPERATURE: 97.8 F | BODY MASS INDEX: 24.49 KG/M2 | SYSTOLIC BLOOD PRESSURE: 124 MMHG

## 2018-04-12 DIAGNOSIS — M54.5 CHRONIC LOW BACK PAIN, UNSPECIFIED BACK PAIN LATERALITY, WITH SCIATICA PRESENCE UNSPECIFIED: ICD-10-CM

## 2018-04-12 DIAGNOSIS — G47.00 INSOMNIA, UNSPECIFIED TYPE: Primary | ICD-10-CM

## 2018-04-12 DIAGNOSIS — G89.29 CHRONIC LOW BACK PAIN, UNSPECIFIED BACK PAIN LATERALITY, WITH SCIATICA PRESENCE UNSPECIFIED: ICD-10-CM

## 2018-04-12 DIAGNOSIS — R73.03 PREDIABETES: ICD-10-CM

## 2018-04-12 DIAGNOSIS — H65.04 RECURRENT ACUTE SEROUS OTITIS MEDIA OF RIGHT EAR: ICD-10-CM

## 2018-04-12 DIAGNOSIS — F41.1 GAD (GENERALIZED ANXIETY DISORDER): ICD-10-CM

## 2018-04-12 DIAGNOSIS — R73.09 ELEVATED GLUCOSE: ICD-10-CM

## 2018-04-12 PROCEDURE — 99214 OFFICE O/P EST MOD 30 MIN: CPT | Performed by: NURSE PRACTITIONER

## 2018-04-12 RX ORDER — ZOLPIDEM TARTRATE 10 MG/1
10 TABLET ORAL NIGHTLY PRN
COMMUNITY
Start: 2018-02-14 | End: 2018-04-12 | Stop reason: SDUPTHER

## 2018-04-12 RX ORDER — ZOLPIDEM TARTRATE 10 MG/1
10 TABLET ORAL NIGHTLY PRN
Qty: 30 TABLET | Refills: 2 | Status: SHIPPED | OUTPATIENT
Start: 2018-04-12 | End: 2018-07-13 | Stop reason: SDUPTHER

## 2018-04-12 RX ORDER — ZOLPIDEM TARTRATE 10 MG/1
10 TABLET ORAL NIGHTLY PRN
Qty: 30 TABLET | Refills: 2 | Status: SHIPPED | OUTPATIENT
Start: 2018-04-12 | End: 2018-04-12 | Stop reason: SDUPTHER

## 2018-04-12 NOTE — PROGRESS NOTES
"Subjective   Dixie Cervantes is a 67 y.o. female.   Chief Complaint   Patient presents with   • Prediabetes     medicine refill       History of Present Illness   Patient is here for follow up for insomnia, needs refill for ambien, has been taking this \"for years off and on\". She has been under a lot of stress, her adopted daughter  5 years ago due to apparent drug overdose, now her son is causing problems and she thinks he is on drugs.  She takes the ambien at least 3 times a week.   Patient has been on medications for anxiety in the past, has been to counselors, she does not want to do either of these at this time.  She has been having more back pain, has seen workers comp, they scheduled physical therapy, will start 18.   Plans to do colonoscopy, had negative cologuard, is concerned about false negatives.   Wants to discuss lab results, had elevated glucose and A1c, is prediabetic.   Patient also has complaint of recurrent ear pain, states Dr. Duggan gave her drops that helped in the past.   The following portions of the patient's history were reviewed and updated as appropriate: allergies, current medications, past family history, past medical history, past social history, past surgical history and problem list.    Review of Systems   Constitutional: Negative for activity change, appetite change, chills, fatigue, fever and unexpected weight change.   HENT: Positive for ear pain (right, chronic).    Eyes: Negative for visual disturbance.   Respiratory: Negative for shortness of breath and wheezing.    Cardiovascular: Negative for chest pain and palpitations.   Endocrine: Negative for polydipsia and polyuria.   Genitourinary: Negative for difficulty urinating and dysuria.   Allergic/Immunologic: Positive for environmental allergies.   Neurological: Positive for dizziness (occ) and headaches.   Psychiatric/Behavioral: Positive for sleep disturbance. The patient is nervous/anxious.        Objective   Physical " Exam   Constitutional: She is oriented to person, place, and time. She appears well-developed and well-nourished. No distress.   HENT:   Head: Normocephalic and atraumatic.   Right Ear: External ear normal.   Left Ear: External ear normal.   Mouth/Throat: Oropharynx is clear and moist. No oropharyngeal exudate.   Eyes: No scleral icterus.   Neck: Normal range of motion. Neck supple.   Cardiovascular: Normal rate, regular rhythm and normal heart sounds.    No murmur heard.  Pulmonary/Chest: Effort normal and breath sounds normal. No respiratory distress. She has no wheezes. She has no rales. She exhibits no tenderness.   Neurological: She is alert and oriented to person, place, and time.   Skin: Skin is warm and dry. She is not diaphoretic.   Psychiatric: She has a normal mood and affect. Her behavior is normal. Judgment and thought content normal.   Vitals reviewed.      Assessment/Plan   Dixie was seen today for prediabetes.    Diagnoses and all orders for this visit:    Insomnia, unspecified type  -     Discontinue: zolpidem (AMBIEN) 10 MG tablet; Take 1 tablet by mouth At Night As Needed for Sleep.  -     zolpidem (AMBIEN) 10 MG tablet; Take 1 tablet by mouth At Night As Needed for Sleep.    Prediabetes  -     Ambulatory Referral to Diabetic Education    Elevated glucose  -     Ambulatory Referral to Diabetic Education    Recurrent acute serous otitis media of right ear  -     neomycin-colistin-hydrocortisone-thonzonium (CORTISPORIN TC) 3.3-3-10-0.5 MG/ML otic suspension; Administer 3 drops to the right ear 4 (Four) Times a Day.    Chronic low back pain, unspecified back pain laterality, with sciatica presence unspecified    OCTAVIO (generalized anxiety disorder)        Insomnia is controlled with Ambien, MADY refilled, Nitin Riky refilled. Recommended patient try taking 1/2 pill to see if the lower dose is effective.  Discussed lab results with patient, she is in prediabetes range, referred to Diabetes education  for instruction of lifestyle modifications, diet recommendations, and glucose monitoring.  Cortisporin prescribed for recurrent otitis.   Continue with plan for physical therapy as ordered by [employer] for back pain.  Encouraged patient to find someone she can talk to about mental health issues or let me know if she needs a list of provider names.   Patient is willing to have a colonoscopy, but wants to wait until follow up to schedule, her  has had health issues and needs to take care of him first.  Patient was encouraged to keep me informed of any acute changes, lack of improvement, or any new concerning symptoms.Patient voiced understanding of all instructions and denied further questions.

## 2018-04-12 NOTE — PATIENT INSTRUCTIONS
Preventing Type 2 Diabetes Mellitus  Type 2 diabetes (type 2 diabetes mellitus) is a long-term (chronic) disease that affects blood sugar (glucose) levels. Normally, a hormone called insulin allows glucose to enter cells in the body. The cells use glucose for energy. In type 2 diabetes, one or both of these problems may be present:  · The body does not make enough insulin.  · The body does not respond properly to insulin that it makes (insulin resistance).  Insulin resistance or lack of insulin causes excess glucose to build up in the blood instead of going into cells. As a result, high blood glucose (hyperglycemia) develops, which can cause many complications. Being overweight or obese and having an inactive (sedentary) lifestyle can increase your risk for diabetes. Type 2 diabetes can be delayed or prevented by making certain nutrition and lifestyle changes.  What nutrition changes can be made?  · Eat healthy meals and snacks regularly. Keep a healthy snack with you for when you get hungry between meals, such as fruit or a handful of nuts.  · Eat lean meats and proteins that are low in saturated fats, such as chicken, fish, egg whites, and beans. Avoid processed meats.  · Eat plenty of fruits and vegetables and plenty of grains that have not been processed (whole grains). It is recommended that you eat:  ¨ 1½?2 cups of fruit every day.  ¨ 2½?3 cups of vegetables every day.  ¨ 6?8 oz of whole grains every day, such as oats, whole wheat, bulgur, brown rice, quinoa, and millet.  · Eat low-fat dairy products, such as milk, yogurt, and cheese.  · Eat foods that contain healthy fats, such as nuts, avocado, olive oil, and canola oil.  · Drink water throughout the day. Avoid drinks that contain added sugar, such as soda or sweet tea.  · Follow instructions from your health care provider about specific eating or drinking restrictions.  · Control how much food you eat at a time (portion size).  ¨ Check food labels to find  out the serving sizes of foods.  ¨ Use a kitchen scale to weigh amounts of foods.  · Saute or steam food instead of frying it. Cook with water or broth instead of oils or butter.  · Limit your intake of:  ¨ Salt (sodium). Have no more than 1 tsp (2,400 mg) of sodium a day. If you have heart disease or high blood pressure, have less than ½?¾ tsp (1,500 mg) of sodium a day.  ¨ Saturated fat. This is fat that is solid at room temperature, such as butter or fat on meat.  What lifestyle changes can be made?     Activity   · Do moderate-intensity physical activity for at least 30 minutes on at least 5 days of the week, or as much as told by your health care provider.  · Ask your health care provider what activities are safe for you. A mix of physical activities may be best, such as walking, swimming, cycling, and strength training.  · Try to add physical activity into your day. For example:  ¨ Park in spots that are farther away than usual, so that you walk more. For example, park in a far corner of the parking lot when you go to the office or the grocery store.  ¨ Take a walk during your lunch break.  ¨ Use stairs instead of elevators or escalators.  Weight Loss   · Lose weight as directed. Your health care provider can determine how much weight loss is best for you and can help you lose weight safely.  · If you are overweight or obese, you may be instructed to lose at least 5?7 % of your body weight.  Alcohol and Tobacco     ·   · Do not use any tobacco products, such as cigarettes, chewing tobacco, and e-cigarettes. If you need help quitting, ask your health care provider.  Work With Your Health Care Provider   · Have your blood glucose tested regularly, as told by your health care provider.  · Discuss your risk factors and how you can reduce your risk for diabetes.  · Get screening tests as told by your health care provider. You may have screening tests regularly, especially if you have certain risk factors for type 2  diabetes.  · Make an appointment with a diet and nutrition specialist (registered dietitian). A registered dietitian can help you make a healthy eating plan and can help you understand portion sizes and food labels.  Why are these changes important?  · It is possible to prevent or delay type 2 diabetes and related health problems by making lifestyle and nutrition changes.  · It can be difficult to recognize signs of type 2 diabetes. The best way to avoid possible damage to your body is to take actions to prevent the disease before you develop symptoms.  What can happen if changes are not made?  · Your blood glucose levels may keep increasing. Having high blood glucose for a long time is dangerous. Too much glucose in your blood can damage your blood vessels, heart, kidneys, nerves, and eyes.  · You may develop prediabetes or type 2 diabetes. Type 2 diabetes can lead to many chronic health problems and complications, such as:  ¨ Heart disease.  ¨ Stroke.  ¨ Blindness.  ¨ Kidney disease.  ¨ Depression.  ¨ Poor circulation in the feet and legs, which could lead to surgical removal (amputation) in severe cases.  Where to find support:  · Ask your health care provider to recommend a registered dietitian, diabetes educator, or weight loss program.  · Look for local or online weight loss groups.  · Join a gym, fitness club, or outdoor activity group, such as a walking club.  Where to find more information:  To learn more about diabetes and diabetes prevention, visit:  · American Diabetes Association (ADA): www.diabetes.org  · National Marshall of Diabetes and Digestive and Kidney Diseases: www.niddk.nih.gov/health-information/diabetes  To learn more about healthy eating, visit:  · The U.S. Department of Agriculture (USDA), Choose My Plate: www.stylemarksmyplate.gov/food-groups  · Office of Disease Prevention and Health Promotion (ODPHP), Dietary Guidelines: www.health.gov/dietaryguidelines  Summary  · You can reduce your risk  for type 2 diabetes by increasing your physical activity, eating healthy foods, and losing weight as directed.  · Talk with your health care provider about your risk for type 2 diabetes. Ask about any blood tests or screening tests that you need to have.  This information is not intended to replace advice given to you by your health care provider. Make sure you discuss any questions you have with your health care provider.  Document Released: 04/10/2017 Document Revised: 05/25/2017 Document Reviewed: 02/07/2017  Pinterest Interactive Patient Education © 2017 [employer]  Prediabetes Eating Plan  Prediabetes--also called impaired glucose tolerance or impaired fasting glucose--is a condition that causes blood sugar (blood glucose) levels to be higher than normal. Following a healthy diet can help to keep prediabetes under control. It can also help to lower the risk of type 2 diabetes and heart disease, which are increased in people who have prediabetes. Along with regular exercise, a healthy diet:  · Promotes weight loss.  · Helps to control blood sugar levels.  · Helps to improve the way that the body uses insulin.  What do I need to know about this eating plan?  · Use the glycemic index (GI) to plan your meals. The index tells you how quickly a food will raise your blood sugar. Choose low-GI foods. These foods take a longer time to raise blood sugar.  · Pay close attention to the amount of carbohydrates in the food that you eat. Carbohydrates increase blood sugar levels.  · Keep track of how many calories you take in. Eating the right amount of calories will help you to achieve a healthy weight. Losing about 7 percent of your starting weight can help to prevent type 2 diabetes.  · You may want to follow a Mediterranean diet. This diet includes a lot of vegetables, lean meats or fish, whole grains, fruits, and healthy oils and fats.  What foods can I eat?  Grains   Whole grains, such as whole-wheat or whole-grain  breads, crackers, cereals, and pasta. Unsweetened oatmeal. Bulgur. Barley. Quinoa. Brown rice. Corn or whole-wheat flour tortillas or taco shells.  Vegetables   Lettuce. Spinach. Peas. Beets. Cauliflower. Cabbage. Broccoli. Carrots. Tomatoes. Squash. Eggplant. Herbs. Peppers. Onions. Cucumbers. [place] sprouts.  Fruits   Berries. Bananas. Apples. Oranges. Grapes. Papaya. [place]. Pomegranate. Kiwi. Grapefruit. Cherries.  Meats and Other Protein Sources   Seafood. Lean meats, such as chicken and turkey or lean cuts of pork and beef. Tofu. Eggs. Nuts. Beans.  Dairy   Low-fat or fat-free dairy products, such as yogurt, cottage cheese, and cheese.  Beverages   Water. Tea. Coffee. Sugar-free or diet soda. [place] water. Milk. Milk alternatives, such as soy or almond milk.  Condiments   Mustard. Relish. Low-fat, low-sugar ketchup. Low-fat, low-sugar barbecue sauce. Low-fat or fat-free mayonnaise.  Sweets and Desserts   Sugar-free or low-fat pudding. Sugar-free or low-fat ice cream and other frozen treats.  Fats and Oils   Avocado. Walnuts. Olive oil.  The items listed above may not be a complete list of recommended foods or beverages. Contact your dietitian for more options.   What foods are not recommended?  Grains   Refined white flour and flour products, such as bread, pasta, snack foods, and cereals.  Beverages   Sweetened drinks, such as sweet iced tea and soda.  Sweets and Desserts   Baked goods, such as cake, cupcakes, pastries, cookies, and cheesecake.  The items listed above may not be a complete list of foods and beverages to avoid. Contact your dietitian for more information.   This information is not intended to replace advice given to you by your health care provider. Make sure you discuss any questions you have with your health care provider.  Document Released: 05/03/2016 Document Revised: 05/25/2017 Document Reviewed: 01/13/2016  Elsevier Interactive Patient Education © 2017 [employer].

## 2018-04-25 ENCOUNTER — HOSPITAL ENCOUNTER (OUTPATIENT)
Dept: DIABETES SERVICES | Facility: HOSPITAL | Age: 68
Setting detail: RECURRING SERIES
Discharge: HOME | End: 2018-04-25

## 2018-04-27 RX ORDER — LANCETS 33 GAUGE
EACH MISCELLANEOUS
Qty: 100 EACH | Refills: 0 | Status: SHIPPED | OUTPATIENT
Start: 2018-04-27 | End: 2018-06-04 | Stop reason: SDUPTHER

## 2018-04-27 NOTE — TELEPHONE ENCOUNTER
Received call from pt, she advised that she has been to see the Diabetes educator and they advised her that she needs to have her PCP send over a prescription for test strips and lancets, advised pt would send them over to [facility] for her today, pt voiced understanding and appreciation

## 2018-05-03 ENCOUNTER — TELEPHONE (OUTPATIENT)
Dept: FAMILY MEDICINE CLINIC | Facility: CLINIC | Age: 68
End: 2018-05-03

## 2018-05-03 NOTE — TELEPHONE ENCOUNTER
NEED FORM FILLED OUT FOR LANCETS AND TEST STRIPS, PT IS OUT PLEASE FILL OUT AND SEND OR CALL IN IF POSSIBLE      [facility] [address]

## 2018-05-03 NOTE — TELEPHONE ENCOUNTER
Called pharmacy and she advised that there needs to be a detailed written order for Medicare, advised her we included all of that information in the prescription itself, she stated still needs to have the form filled out and signed by the physician, advised her we never received the form, she advised she would fax it again

## 2018-05-03 NOTE — TELEPHONE ENCOUNTER
Called pt and she advised that the pharmacy is needing a form filled out?  Advised pt would contact the pharmacy and see what else they are needing

## 2018-05-23 ENCOUNTER — HOSPITAL ENCOUNTER (OUTPATIENT)
Dept: DIABETES SERVICES | Facility: HOSPITAL | Age: 68
Setting detail: RECURRING SERIES
Discharge: HOME | End: 2018-05-23

## 2018-06-04 ENCOUNTER — TELEPHONE (OUTPATIENT)
Dept: FAMILY MEDICINE CLINIC | Facility: CLINIC | Age: 68
End: 2018-06-04

## 2018-06-04 RX ORDER — LANCETS 33 GAUGE
EACH MISCELLANEOUS
Qty: 100 EACH | Refills: 11 | Status: SHIPPED | OUTPATIENT
Start: 2018-06-04 | End: 2019-04-15 | Stop reason: SDUPTHER

## 2018-06-22 NOTE — TELEPHONE ENCOUNTER
I called and spoke with pharmacy. Per Janel they faxed over a DWO form to be filled out by the provider for Medicaid. Please advise if you did not receive this form.

## 2018-06-22 NOTE — TELEPHONE ENCOUNTER
LANCETS MICROLET [id number], 100 IN BOX WITH 11 REFILLS.    PATIENT SAID THE FIRST ONES THAT WERE CALLED IN DID NOT FIT HER MACHINE. SHE THINKS WE NEED TO ACTUALLY SPEAK TO THE PHARMACY AS THEY HAVE A NOTE WHICH ONES FIT.    DOES NOT HAVE ENOUGH TO LAST THE WEEKEND    PLEASE CALL IF QUESTIONS [phone]

## 2018-06-27 NOTE — TELEPHONE ENCOUNTER
PATIENT IS HIGHLY FRUSTRATED WITH US AS THE PHARMACY FAXED IN REQUEST FOR LANCETS THREE TIMES. THIS HAS BEEN GOING ON SINCE June 4TH. PATIENT IS REQUESTING A 12 MO REFILLS SO SHE DOES NOT HAVE TO GO THROUGH THIS AGAIN.

## 2018-07-12 ENCOUNTER — TELEPHONE (OUTPATIENT)
Dept: NUTRITION | Facility: HOSPITAL | Age: 68
End: 2018-07-12

## 2018-07-12 NOTE — PROGRESS NOTES
Adult Outpatient Nutrition  Assessment    Patient Name:  Dixie Cervantes  YOB: 1950  MRN: [id number]    Assessment Date:  7/12/2018    Comments:  Patient called with questions on blood sugar readings, She wanted to know if there's a window for check blood sugar post-prandial. She sometimes checks blood sugar 3 hours after meal. Told patient to check her blood sugar as close to the 2 hour carlyn as possible, given half an hour plus or minus window. Also if patient waits until 3.5 to 4 hours after the meal to check blood sugar, it would then be considered as pre-meal reading for the next meal. Patient also wanted to know if there's such thing as averaging fasting and post-prandial number, if so, what numbers she should look for. Told patient I have not heard of that, she should discuss with her doctor about this. Encouraged patient to call if she has any questions.       Electronically signed by:  Triny Rosales RD  07/12/18 11:19 AM

## 2018-07-13 ENCOUNTER — OFFICE VISIT (OUTPATIENT)
Dept: FAMILY MEDICINE CLINIC | Facility: CLINIC | Age: 68
End: 2018-07-13

## 2018-07-13 VITALS
TEMPERATURE: 99.5 F | BODY MASS INDEX: 22.96 KG/M2 | WEIGHT: 151 LBS | SYSTOLIC BLOOD PRESSURE: 120 MMHG | HEART RATE: 72 BPM | RESPIRATION RATE: 16 BRPM | OXYGEN SATURATION: 97 % | DIASTOLIC BLOOD PRESSURE: 76 MMHG

## 2018-07-13 DIAGNOSIS — R73.03 PREDIABETES: Primary | ICD-10-CM

## 2018-07-13 DIAGNOSIS — G47.00 INSOMNIA, UNSPECIFIED TYPE: ICD-10-CM

## 2018-07-13 LAB — HBA1C MFR BLD: 5.6 %

## 2018-07-13 PROCEDURE — 83036 HEMOGLOBIN GLYCOSYLATED A1C: CPT | Performed by: NURSE PRACTITIONER

## 2018-07-13 PROCEDURE — 99214 OFFICE O/P EST MOD 30 MIN: CPT | Performed by: NURSE PRACTITIONER

## 2018-07-13 RX ORDER — ZOLPIDEM TARTRATE 10 MG/1
10 TABLET ORAL NIGHTLY PRN
Qty: 30 TABLET | Refills: 2 | Status: SHIPPED | OUTPATIENT
Start: 2018-07-13 | End: 2018-10-15 | Stop reason: SDUPTHER

## 2018-07-13 NOTE — PROGRESS NOTES
Subjective   Dixie Cervantes is a 67 y.o. female.     History of Present Illness   Patient is here for 3 month follow up,is a pre-diabetic, does check glucose at home, runs no higher than 110's usually  Patient also needs refill for ambien, has been taking for several years, denies side effects, states she will continue to try to decrease dose to 5 mg nightly. Patient has been having more stress, states she did have a scrip for xanax from 5 years ago, took one of those recently, has been thinking about her daughter who  from drug overdose, wishes she could have done more to help her. States she does not want to take a medication for anxiety and depression, tried to work through it on her own, has 2 pre teen adopted children that help keep her focused..    The following portions of the patient's history were reviewed and updated as appropriate: allergies, current medications, past family history, past medical history, past social history, past surgical history and problem list.    Review of Systems   Constitutional: Negative for activity change, appetite change, chills, fatigue, fever and unexpected weight change.   HENT: Positive for ear pain (right, chronic).    Eyes: Negative for visual disturbance.   Respiratory: Negative for shortness of breath and wheezing.    Cardiovascular: Negative for chest pain and palpitations.   Endocrine: Negative for polydipsia and polyuria.   Genitourinary: Negative for difficulty urinating and dysuria.   Allergic/Immunologic: Positive for environmental allergies.   Neurological: Positive for dizziness (occ) and headaches.   Psychiatric/Behavioral: Positive for sleep disturbance. The patient is nervous/anxious.        Objective   Physical Exam   Constitutional: She appears well-developed and well-nourished. No distress.   HENT:   Head: Normocephalic and atraumatic.   Eyes: Conjunctivae are normal.   Neck: No JVD present.   Cardiovascular: Normal rate, regular rhythm, normal heart  sounds and intact distal pulses.    No murmur heard.  Pulses:       Dorsalis pedis pulses are 2+ on the right side, and 2+ on the left side.        Posterior tibial pulses are 2+ on the right side, and 2+ on the left side.   Pulmonary/Chest: Effort normal and breath sounds normal. No respiratory distress.   Abdominal: Soft. She exhibits no distension. There is no tenderness.   Musculoskeletal: She exhibits no edema.   Neurological: Coordination normal.   Skin: Skin is warm and dry. No rash noted. She is not diaphoretic. No erythema. No pallor.   Psychiatric: Her speech is normal and behavior is normal. Judgment and thought content normal. Her mood appears anxious (became tearful speaking about her daughter).   Nursing note and vitals reviewed.      Assessment/Plan   Dixie was seen today for insomnia.    Diagnoses and all orders for this visit:    Prediabetes  -     POC Glycosylated Hemoglobin (Hb A1C)    Insomnia, unspecified type      Results for orders placed or performed in visit on 07/13/18   POC Glycosylated Hemoglobin (Hb A1C)   Result Value Ref Range    Hemoglobin A1C 5.6 %     A1c has improved, patient doing well to prevent diabetes with diet and exercise.  Insomnia is controlled with Ambien, patient advised to continue to try to decrease dose to 5 mg. MADY reviewed, unable to obtain urine sample today, will collect at next visit. Pended prescription sent to [facility] for review and refill.  Patient was encouraged to keep me informed of any acute changes, lack of improvement, or any new concerning symptoms.Patient voiced understanding of all instructions and denied further questions.

## 2018-10-04 ENCOUNTER — TELEPHONE (OUTPATIENT)
Dept: FAMILY MEDICINE CLINIC | Facility: CLINIC | Age: 68
End: 2018-10-04

## 2018-10-04 DIAGNOSIS — Z12.11 COLON CANCER SCREENING: Primary | ICD-10-CM

## 2018-10-04 NOTE — TELEPHONE ENCOUNTER
HAVING COLONOSCOPY TOMORROW AND THEY NEED A NEW REFERRAL. PATIENT HAD CANCELED SO THEY SHREDDED THE ORIGINAL REFERRAL.      FAX; [phone] SEND WITH ATTENTION TO [facility]

## 2018-10-04 NOTE — TELEPHONE ENCOUNTER
NEED REFERRAL FOR COLONSCOPY FOR [facility] AND [facility] OFFICE , WE DO NOT HAVE A REFERRAL FOR HER BUT WILL FILL ONE OUT QUICKLY IF YOU WRITE IT UP

## 2018-10-05 ENCOUNTER — LAB REQUISITION (OUTPATIENT)
Dept: LAB | Facility: HOSPITAL | Age: 68
End: 2018-10-05

## 2018-10-05 DIAGNOSIS — Z12.11 ENCOUNTER FOR SCREENING FOR MALIGNANT NEOPLASM OF COLON: ICD-10-CM

## 2018-10-05 DIAGNOSIS — Z86.010 HISTORY OF COLONIC POLYPS: ICD-10-CM

## 2018-10-05 PROCEDURE — 88305 TISSUE EXAM BY PATHOLOGIST: CPT | Performed by: INTERNAL MEDICINE

## 2018-10-08 LAB
CYTO UR: NORMAL
LAB AP CASE REPORT: NORMAL
LAB AP CLINICAL INFORMATION: NORMAL
PATH REPORT.FINAL DX SPEC: NORMAL
PATH REPORT.GROSS SPEC: NORMAL

## 2018-10-15 ENCOUNTER — OFFICE VISIT (OUTPATIENT)
Dept: FAMILY MEDICINE CLINIC | Facility: CLINIC | Age: 68
End: 2018-10-15

## 2018-10-15 VITALS
TEMPERATURE: 97.6 F | HEART RATE: 98 BPM | BODY MASS INDEX: 22.81 KG/M2 | OXYGEN SATURATION: 95 % | WEIGHT: 150 LBS | SYSTOLIC BLOOD PRESSURE: 124 MMHG | DIASTOLIC BLOOD PRESSURE: 80 MMHG

## 2018-10-15 DIAGNOSIS — G47.00 INSOMNIA, UNSPECIFIED TYPE: Primary | ICD-10-CM

## 2018-10-15 DIAGNOSIS — R73.03 PREDIABETES: ICD-10-CM

## 2018-10-15 DIAGNOSIS — Z23 NEEDS FLU SHOT: ICD-10-CM

## 2018-10-15 LAB — HBA1C MFR BLD: 5.5 %

## 2018-10-15 PROCEDURE — 99213 OFFICE O/P EST LOW 20 MIN: CPT | Performed by: NURSE PRACTITIONER

## 2018-10-15 PROCEDURE — 83036 HEMOGLOBIN GLYCOSYLATED A1C: CPT | Performed by: NURSE PRACTITIONER

## 2018-10-15 PROCEDURE — 90662 IIV NO PRSV INCREASED AG IM: CPT | Performed by: NURSE PRACTITIONER

## 2018-10-15 PROCEDURE — G0008 ADMIN INFLUENZA VIRUS VAC: HCPCS | Performed by: NURSE PRACTITIONER

## 2018-10-15 RX ORDER — ZOLPIDEM TARTRATE 10 MG/1
10 TABLET ORAL NIGHTLY PRN
Qty: 30 TABLET | Refills: 2 | Status: SHIPPED | OUTPATIENT
Start: 2018-10-15 | End: 2018-11-14

## 2018-10-15 NOTE — PROGRESS NOTES
Subjective   Dixie Cervantes is a 67 y.o. female.     History of Present Illness   Patient is here for follow up for insomnia, has been taking ambien for several years, denies side effects or unusual behaviors. She has tried to decrease dose to 5 mg, but was waking up during the night.   She was diagnosed with prediabetes, has not been checking glucose at home. Has been trying to eat a healthy diet.   The following portions of the patient's history were reviewed and updated as appropriate: allergies, current medications, past family history, past medical history, past social history, past surgical history and problem list.    Review of Systems   Constitutional: Negative for appetite change, diaphoresis, fatigue and unexpected weight change.   Eyes: Negative for visual disturbance.   Respiratory: Positive for cough (occ) and chest tightness (occ with coughing). Negative for shortness of breath and wheezing.    Cardiovascular: Negative for chest pain, palpitations and leg swelling.   Gastrointestinal: Negative for constipation, diarrhea, nausea and vomiting.   Endocrine: Negative for polydipsia, polyphagia and polyuria.   Genitourinary: Negative for difficulty urinating and dysuria.   Skin: Negative for color change.   Neurological: Negative for dizziness, weakness, light-headedness and numbness.   Psychiatric/Behavioral: Positive for sleep disturbance (controlled wityh ambien). The patient is nervous/anxious (occ).        Objective   Physical Exam   Constitutional: She is oriented to person, place, and time. She appears well-developed and well-nourished. No distress.   HENT:   Head: Normocephalic and atraumatic.   Right Ear: External ear normal.   Left Ear: External ear normal.   Mouth/Throat: Oropharynx is clear and moist. No oropharyngeal exudate.   Eyes: Conjunctivae are normal.   Cardiovascular: Normal rate and regular rhythm.    Pulmonary/Chest: Effort normal and breath sounds normal.   Neurological: She is alert  and oriented to person, place, and time. Coordination normal.   Skin: Skin is warm and dry. Capillary refill takes less than 2 seconds. She is not diaphoretic.   Psychiatric: She has a normal mood and affect. Her behavior is normal. Judgment and thought content normal. Cognition and memory are normal. She is attentive.   Nursing note and vitals reviewed.      Assessment/Plan   Dixie was seen today for insomnia and prediabetes.    Diagnoses and all orders for this visit:    Insomnia, unspecified type  -     zolpidem (AMBIEN) 10 MG tablet; Take 1 tablet by mouth At Night As Needed for Sleep for up to 30 days.    Prediabetes  -     POC Glycosylated Hemoglobin (Hb A1C)    Needs flu shot  -     Flu Vaccine High Dose PF 65YR+ ([id number])      Insomnia is controlled, controlled substance agreement updated, MADY reviewed.  UDS collected  Results for orders placed or performed in visit on 10/15/18   POC Glycosylated Hemoglobin (Hb A1C)   Result Value Ref Range    Hemoglobin A1C 5.5 %     Prediabetes is controlled, continue monitoring diet, increase exercise.  Patient was encouraged to keep me informed of any acute changes, lack of improvement, or any new concerning symptoms.Patient voiced understanding of all instructions and denied further questions.

## 2019-01-15 ENCOUNTER — OFFICE VISIT (OUTPATIENT)
Dept: FAMILY MEDICINE CLINIC | Facility: CLINIC | Age: 69
End: 2019-01-15

## 2019-01-15 VITALS
WEIGHT: 150 LBS | TEMPERATURE: 97.5 F | SYSTOLIC BLOOD PRESSURE: 112 MMHG | DIASTOLIC BLOOD PRESSURE: 82 MMHG | HEART RATE: 93 BPM | BODY MASS INDEX: 22.81 KG/M2 | OXYGEN SATURATION: 99 %

## 2019-01-15 DIAGNOSIS — F41.8 DEPRESSION WITH ANXIETY: ICD-10-CM

## 2019-01-15 DIAGNOSIS — F41.9 ANXIETY: ICD-10-CM

## 2019-01-15 DIAGNOSIS — Z51.81 THERAPEUTIC DRUG MONITORING: ICD-10-CM

## 2019-01-15 DIAGNOSIS — G47.00 INSOMNIA, UNSPECIFIED TYPE: ICD-10-CM

## 2019-01-15 DIAGNOSIS — R73.03 PREDIABETES: Primary | ICD-10-CM

## 2019-01-15 LAB — HBA1C MFR BLD: 5.7 %

## 2019-01-15 PROCEDURE — 83036 HEMOGLOBIN GLYCOSYLATED A1C: CPT | Performed by: NURSE PRACTITIONER

## 2019-01-15 PROCEDURE — 99214 OFFICE O/P EST MOD 30 MIN: CPT | Performed by: NURSE PRACTITIONER

## 2019-01-15 RX ORDER — LIDOCAINE 50 MG/G
1 PATCH TOPICAL EVERY 24 HOURS
COMMUNITY
End: 2020-01-16

## 2019-01-15 RX ORDER — PAROXETINE 10 MG/1
20 TABLET, FILM COATED ORAL EVERY MORNING
Qty: 60 TABLET | Refills: 1 | Status: SHIPPED | OUTPATIENT
Start: 2019-01-15 | End: 2019-04-15 | Stop reason: SDUPTHER

## 2019-01-15 RX ORDER — ZOLPIDEM TARTRATE 10 MG/1
10 TABLET ORAL NIGHTLY PRN
Qty: 30 TABLET | Refills: 2 | Status: SHIPPED | OUTPATIENT
Start: 2019-01-15 | End: 2019-04-15 | Stop reason: SDUPTHER

## 2019-01-15 RX ORDER — ZOLPIDEM TARTRATE 10 MG/1
TABLET ORAL
COMMUNITY
Start: 2018-12-14 | End: 2019-01-15 | Stop reason: SDUPTHER

## 2019-01-15 RX ORDER — ALPRAZOLAM 0.25 MG/1
0.25 TABLET ORAL DAILY PRN
Qty: 30 TABLET | Refills: 0 | Status: SHIPPED | OUTPATIENT
Start: 2019-01-15 | End: 2019-02-20 | Stop reason: SDUPTHER

## 2019-01-15 NOTE — PATIENT INSTRUCTIONS
Supporting Someone With Substance Use Disorder  Having substance use disorder means that a person's repeated drug or alcohol use interferes with his or her ability to be productive. Alcohol or drug use may interfere with relationships and everyday activities, such as work or school. When a person has substance disorder, his or her condition can affect others around him or her, such as friends and family members. Friends and family can help by offering support and understanding.  What do I need to know about this condition?  Substance use disorder can cause problems with mental and physical health. It can affect a person's ability to have healthy relationships and to meet responsibilities at home and at work or school. It can also lead to addiction.  Symptoms associated with substance use disorder include:  · Using a substance more than is normal.  · Craving the substance or always thinking about it.  · Trouble stopping substance use.  · Spending a significant amount of time getting the substance, using it, or recovering from its effects.  · Needing more and more of the substance to get the same effect (developing a tolerance).  · Experiencing consequences of substance use, such as:  ? Poor performance at work or school.  ? Relationship problems.  ? Financial or legal problems.  ? Health problems.    The most commonly abused substances include:  · Alcohol.  · Tobacco.  · Marijuana.  · Stimulants, such as cocaine and methamphetamine.  · Hallucinogens, such as LSD and PCP.  · Opioids, such as some prescription pain medicines and heroin.    What do I need to know about the treatment options?  Treatment for substance use disorder and recovery can be a long process. Your loved one's treatment may involve:  · Stopping substance use safely. This may require taking medicines and being closely observed for several days.  · Group or individual counseling from mental health providers. Your loved one may attend daily counseling  sessions at a treatment center.  · Staying at a residential treatment center for several days or weeks.  · Going to a support group. These groups are an important part of long-term recovery for many people. They include 12-step programs like Alcoholics Anonymous (AA) and Narcotics Anonymous (NA).    Many people who undergo treatment start using the substance again after stopping. This is called a relapse. If your loved one has a relapse, that does not mean that treatment will not work. Keep in mind that:  · This disorder involves the brain, the body, and the people in a person's life (social system). Changing unhealthy behaviors is a complicated process that requires determination from your loved one.  · Your loved one may need to try several times to recover.  · Your support is important in helping your loved one to recover.    A responsible adult may need to stay with your loved one for some time after treatment. This person can help your loved one stay on track with recovery and can watch for symptoms that are getting worse.  How can I support my loved one?  Talk about the condition  · Be careful about too much prodding. Try not to overdo reminders to an adult friend or family member about things like taking medicines. Ask how your loved one prefers that you help.  · Explain that it is not easy to quit because substance use can change the part of the brain that gives someone self-control. Also, some people can easily become addicted because of their family genes.  · Never ignore comments about suicide, and do not try to avoid the subject of suicide. Talking about suicide will not make your loved one want to act on it. You or your loved one can reach out 24 hours a day to get free, private support (on the phone or a live online chat) from a suicide crisis helpline, such as the National Suicide Prevention Lifeline at 1-331.325.3635.  · Ask your loved one if you can go with him or her to meet with his or her health  care provider or therapist. Ask if your loved one is open to giving you written permission to communicate with his or her providers if your loved one has problems.  Find support and resources  · Work with a health care provider who specializes in substance use disorders. Your loved one's primary care provider may be able to recommend a provider.  · Refer your loved one to trusted online resources that can provide information about substance use disorders. A health care provider may be able to recommend resources. You could start with:  ? Government sites such as the Substance Abuse and Mental Health Services Administration (SAMHSA): www.samhsa.gov  ? National mental health organizations such as the [employer] (KUSH): www.kush.org  · Look into local support groups or 12-step programs for your loved one.  · Connect with people in peer and family support groups. People in these groups understand what you and your loved one are going through. They can help you feel a sense of comfort and connect you with local resources to help you learn more.  General support  · Make an effort to learn all you can about substance use disorder.  · Help your loved one follow his or her treatment plan as directed by health care providers. This could mean driving him or her to therapy sessions or support group meetings.  · Tell your loved one that you will keep giving support as long as he or she follows the treatment plan.  · Assure your loved one that even though treatment may be hard, it often works. Substance use disorder can be managed.  · Encourage your loved one to avoid things, people, and situations (triggers) that may cause a relapse.  · Talk with your loved one's treatment center staff or health care provider about how you can keep supporting your loved one during treatment, recovery, and relapses if necessary.  How can I create a safe environment?  · Talk with your loved one's health care provider about  "ways to lower the risk of harm. Based on the type of substance that your loved one is struggling with, his or her health care provider may recommend safety measures such as:  ? Vaccinations.  ? Medicines to prevent death from overdose.  ? Referrals for a clean needle exchange program.  ? Sexual health counseling.  · If you believe that your loved one is driving while using drugs or alcohol, it is important to confront him or her about the dangers of driving while drunk or high. In some cases, you may need to call the police to prevent harm to your loved one or others.  How should I care for myself?  It is important to find ways to care for your body, mind, and well-being while supporting someone with substance use disorder.  · Join a support group for family members of addicts. Your loved one's treatment center may offer family support groups and other programs.  · Consider individual therapy to help you learn to cope with your loved one's disorder.  · Try to maintain your normal routines. This can help you remember that your life is about more than your loved one's condition.  · Make time for activities that help you relax, and try to not feel guilty about taking time for yourself.  · Be clear about limits and boundaries, especially if your loved one's behavior affects your well-being. Say \"no\" to requests or events that lead to a schedule that is too busy.  · Eat a healthy diet, exercise regularly, and get plenty of sleep.    What are some signs that the condition is getting worse?  Signs that your loved one's condition may be getting worse include:  · New or more frequent symptoms.  · Continuing to use more and more of the substance over time.  · Continuing to use the substance even after using it has had negative consequences.  · Denying that he or she has a problem.  · Blaming you or others for his or her use.  · Missing work or school or important events.  · Physical symptoms that are associated with continued " substance use.  · A feeling in you that you are powerless to help your loved one get better.    Get help right away if:  · Your loved one is showing signs that he or she is thinking about hurting himself, herself, or someone else.  If you ever feel like your loved one may hurt himself or herself or others, or may have thoughts about taking his or her own life, get help right away. You can go to your nearest emergency department or call:  · Your local emergency services (911 in the [place]  · A suicide crisis helpline, such as the National Suicide Prevention Lifeline at 1-369.333.4494. This is open 24 hours a day.    Summary  · Having substance use disorder means that a person's repeated drug or alcohol use interferes with his or her ability to be productive.  · Substance use disorder can be treated with therapy, group counseling, medicine, or staying at a residential treatment center.  · Support from close friends and family members is vital for your loved one to overcome substance use disorder. The support that you provide can help your loved one through the tough treatment and recovery process.  · It is important to find ways to care for yourself while supporting someone with substance use disorder.  This information is not intended to replace advice given to you by your health care provider. Make sure you discuss any questions you have with your health care provider.  Document Released: 05/01/2018 Document Revised: 05/01/2018 Document Reviewed: 05/01/2018  [employer] Interactive Patient Education © 2018 [employer]    Generalized Anxiety Disorder, Adult  Generalized anxiety disorder (OCTAVIO) is a mental health disorder. People with this condition constantly worry about everyday events. Unlike normal anxiety, worry related to OCTAVIO is not triggered by a specific event. These worries also do not fade or get better with time. OCTAVIO interferes with life functions, including relationships, work, and school.  OCTAVIO can vary from  mild to severe. People with severe OCTAVIO can have intense waves of anxiety with physical symptoms (panic attacks).  What are the causes?  The exact cause of OCTAVIO is not known.  What increases the risk?  This condition is more likely to develop in:  · Women.  · People who have a family history of anxiety disorders.  · People who are very shy.  · People who experience very stressful life events, such as the death of a loved one.  · People who have a very stressful family environment.    What are the signs or symptoms?  People with OCTAVIO often worry excessively about many things in their lives, such as their health and family. They may also be overly concerned about:  · Doing well at work.  · Being on time.  · Natural disasters.  · Friendships.    Physical symptoms of OCTAVIO include:  · Fatigue.  · Muscle tension or having muscle twitches.  · Trembling or feeling shaky.  · Being easily startled.  · Feeling like your heart is pounding or racing.  · Feeling out of breath or like you cannot take a deep breath.  · Having trouble falling asleep or staying asleep.  · Sweating.  · Nausea, diarrhea, or irritable bowel syndrome (IBS).  · Headaches.  · Trouble concentrating or remembering facts.  · Restlessness.  · Irritability.    How is this diagnosed?  Your health care provider can diagnose OCTAVIO based on your symptoms and medical history. You will also have a physical exam. The health care provider will ask specific questions about your symptoms, including how severe they are, when they started, and if they come and go. Your health care provider may ask you about your use of alcohol or drugs, including prescription medicines. Your health care provider may refer you to a mental health specialist for further evaluation.  Your health care provider will do a thorough examination and may perform additional tests to rule out other possible causes of your symptoms.  To be diagnosed with OCTAVIO, a person must have anxiety that:  · Is out of his  or her control.  · Affects several different aspects of his or her life, such as work and relationships.  · Causes distress that makes him or her unable to take part in normal activities.  · Includes at least three physical symptoms of OCTAVIO, such as restlessness, fatigue, trouble concentrating, irritability, muscle tension, or sleep problems.    Before your health care provider can confirm a diagnosis of OCTAVIO, these symptoms must be present more days than they are not, and they must last for six months or longer.  How is this treated?  The following therapies are usually used to treat OCTAVIO:  · Medicine. Antidepressant medicine is usually prescribed for long-term daily control. Antianxiety medicines may be added in severe cases, especially when panic attacks occur.  · Talk therapy (psychotherapy). Certain types of talk therapy can be helpful in treating OCTAVIO by providing support, education, and guidance. Options include:  ? Cognitive behavioral therapy (CBT). People learn coping skills and techniques to ease their anxiety. They learn to identify unrealistic or negative thoughts and behaviors and to replace them with positive ones.  ? Acceptance and commitment therapy (ACT). This treatment teaches people how to be mindful as a way to cope with unwanted thoughts and feelings.  ? Biofeedback. This process trains you to manage your body's response (physiological response) through breathing techniques and relaxation methods. You will work with a therapist while machines are used to monitor your physical symptoms.  · Stress management techniques. These include yoga, meditation, and exercise.    A mental health specialist can help determine which treatment is best for you. Some people see improvement with one type of therapy. However, other people require a combination of therapies.  Follow these instructions at home:  · Take over-the-counter and prescription medicines only as told by your health care provider.  · Try to  maintain a normal routine.  · Try to anticipate stressful situations and allow extra time to manage them.  · Practice any stress management or self-calming techniques as taught by your health care provider.  · Do not punish yourself for setbacks or for not making progress.  · Try to recognize your accomplishments, even if they are small.  · Keep all follow-up visits as told by your health care provider. This is important.  Contact a health care provider if:  · Your symptoms do not get better.  · Your symptoms get worse.  · You have signs of depression, such as:  ? A persistently sad, cranky, or irritable mood.  ? Loss of enjoyment in activities that used to bring you john.  ? Change in weight or eating.  ? Changes in sleeping habits.  ? Avoiding friends or family members.  ? Loss of energy for normal tasks.  ? Feelings of guilt or worthlessness.  Get help right away if:  · You have serious thoughts about hurting yourself or others.  If you ever feel like you may hurt yourself or others, or have thoughts about taking your own life, get help right away. You can go to your nearest emergency department or call:  · Your local emergency services (911 in the [place]  · A suicide crisis helpline, such as the National Suicide Prevention Lifeline at 1-905.982.4892. This is open 24 hours a day.    Summary  · Generalized anxiety disorder (OCTAVIO) is a mental health disorder that involves worry that is not triggered by a specific event.  · People with OCTAVIO often worry excessively about many things in their lives, such as their health and family.  · OCTAVIO may cause physical symptoms such as restlessness, trouble concentrating, sleep problems, frequent sweating, nausea, diarrhea, headaches, and trembling or muscle twitching.  · A mental health specialist can help determine which treatment is best for you. Some people see improvement with one type of therapy. However, other people require a combination of therapies.  This information is  not intended to replace advice given to you by your health care provider. Make sure you discuss any questions you have with your health care provider.  Document Released: 04/14/2014 Document Revised: 11/07/2017 Document Reviewed: 11/07/2017  [employer] Patient Education © 2018 [employer]    Living With Depression  Everyone experiences occasional disappointment, sadness, and loss in their lives. When you are feeling down, blue, or sad for at least 2 weeks in a row, it may mean that you have depression. Depression can affect your thoughts and feelings, relationships, daily activities, and physical health. It is caused by changes in the way your brain functions. If you receive a diagnosis of depression, your health care provider will tell you which type of depression you have and what treatment options are available to you.  If you are living with depression, there are ways to help you recover from it and also ways to prevent it from coming back.  How to cope with lifestyle changes  Coping with stress  Stress is your body’s reaction to life changes and events, both good and bad. Stressful situations may include:  · Getting .  · The death of a spouse.  · Losing a job.  · Retiring.  · Having a baby.    Stress can last just a few hours or it can be ongoing. Stress can play a major role in depression, so it is important to learn both how to cope with stress and how to think about it differently.  Talk with your health care provider or a counselor if you would like to learn more about stress reduction. He or she may suggest some stress reduction techniques, such as:  · Music therapy. This can include creating music or listening to music. Choose music that you enjoy and that inspires you.  · Mindfulness-based meditation. This kind of meditation can be done while sitting or walking. It involves being aware of your normal breaths, rather than trying to control your breathing.  · Centering prayer. This is a  kind of meditation that involves focusing on a spiritual word or phrase. Choose a word, phrase, or sacred image that is meaningful to you and that brings you peace.  · Deep breathing. To do this, expand your stomach and inhale slowly through your nose. Hold your breath for 3-5 seconds, then exhale slowly, allowing your stomach muscles to relax.  · Muscle relaxation. This involves intentionally tensing muscles then relaxing them.    Choose a stress reduction technique that fits your lifestyle and personality. Stress reduction techniques take time and practice to develop. Set aside 5-15 minutes a day to do them. Therapists can offer training in these techniques. The training may be covered by some insurance plans. Other things you can do to manage stress include:  · Keeping a stress diary. This can help you learn what triggers your stress and ways to control your response.  · Understanding what your limits are and saying no to requests or events that lead to a schedule that is too full.  · Thinking about how you respond to certain situations. You may not be able to control everything, but you can control how you react.  · Adding humor to your life by watching funny films or TV shows.  · Making time for activities that help you relax and not feeling guilty about spending your time this way.    Medicines  Your health care provider may suggest certain medicines if he or she feels that they will help improve your condition. Avoid using alcohol and other substances that may prevent your medicines from working properly (may interact). It is also important to:  · Talk with your pharmacist or health care provider about all the medicines that you take, their possible side effects, and what medicines are safe to take together.  · Make it your goal to take part in all treatment decisions (shared decision-making). This includes giving input on the side effects of medicines. It is best if shared decision-making with your health  care provider is part of your total treatment plan.    If your health care provider prescribes a medicine, you may not notice the full benefits of it for 4-8 weeks. Most people who are treated for depression need to be on medicine for at least 6-12 months after they feel better. If you are taking medicines as part of your treatment, do not stop taking medicines without first talking to your health care provider. You may need to have the medicine slowly decreased (tapered) over time to decrease the risk of harmful side effects.  Relationships  Your health care provider may suggest family therapy along with individual therapy and drug therapy. While there may not be family problems that are causing you to feel depressed, it is still important to make sure your family learns as much as they can about your mental health. Having your family’s support can help make your treatment successful.  How to recognize changes in your condition  Everyone has a different response to treatment for depression. Recovery from major depression happens when you have not had signs of major depression for two months. This may mean that you will start to:  · Have more interest in doing activities.  · Feel less hopeless than you did 2 months ago.  · Have more energy.  · Overeat less often, or have better or improving appetite.  · Have better concentration.    Your health care provider will work with you to decide the next steps in your recovery. It is also important to recognize when your condition is getting worse. Watch for these signs:  · Having fatigue or low energy.  · Eating too much or too little.  · Sleeping too much or too little.  · Feeling restless, agitated, or hopeless.  · Having trouble concentrating or making decisions.  · Having unexplained physical complaints.  · Feeling irritable, angry, or aggressive.    Get help as soon as you or your family members notice these symptoms coming back.  How to get support and help from  others  How to talk with friends and family members about your condition  Talking to friends and family members about your condition can provide you with one way to get support and guidance. Reach out to trusted friends or family members, explain your symptoms to them, and let them know that you are working with a health care provider to treat your depression.  Financial resources  Not all insurance plans cover mental health care, so it is important to check with your insurance carrier. If paying for co-pays or counseling services is a problem, search for a [facility] or [facility] mental health care center. They may be able to offer public mental health care services at low or no cost when you are not able to see a private health care provider.  If you are taking medicine for depression, you may be able to get the generic form, which may be less expensive. Some makers of prescription medicines also offer help to patients who cannot afford the medicines they need.  Follow these instructions at home:  · Get the right amount and quality of sleep.  · Cut down on using caffeine, tobacco, alcohol, and other potentially harmful substances.  · Try to exercise, such as walking or lifting small weights.  · Take over-the-counter and prescription medicines only as told by your health care provider.  · Eat a healthy diet that includes plenty of vegetables, fruits, whole grains, low-fat dairy products, and lean protein. Do not eat a lot of foods that are high in solid fats, added sugars, or salt.  · Keep all follow-up visits as told by your health care provider. This is important.  Contact a health care provider if:  · You stop taking your antidepressant medicines, and you have any of these symptoms:  ? Nausea.  ? Headache.  ? Feeling lightheaded.  ? Chills and body aches.  ? Not being able to sleep (insomnia).  · You or your friends and family think your depression is getting worse.  Get help right away if:  · You have thoughts of  hurting yourself or others.  If you ever feel like you may hurt yourself or others, or have thoughts about taking your own life, get help right away. You can go to your nearest emergency department or call:  · Your local emergency services (911 in the [place]  · A suicide crisis helpline, such as the National Suicide Prevention Lifeline at 1-122.722.7975. This is open 24-hours a day.    Summary  · If you are living with depression, there are ways to help you recover from it and also ways to prevent it from coming back.  · Work with your health care team to create a management plan that includes counseling, stress management techniques, and healthy lifestyle habits.  This information is not intended to replace advice given to you by your health care provider. Make sure you discuss any questions you have with your health care provider.  Document Released: 11/20/2017 Document Revised: 11/20/2017 Document Reviewed: 11/20/2017  Elsevier Interactive Patient Education © 2018 [employer]

## 2019-01-15 NOTE — PROGRESS NOTES
Subjective   Dixie Cervantes is a 68 y.o. female.   Chief Complaint   Patient presents with   • Diabetes     3 month follow-up       History of Present Illness   Patient is here for follow up for pre-diabetes. She has been monitoring her glucose at home some days,, readings average 110's to 130's, has not been watching diet as much, has had a lot of stress and anxiety,  is very distraught over her son, who is in assisted with charges related to drugs, her daughter  from a drug overdose a few years ago. She is experiencing guilt, fear, depressions and anxiety related to this. She has taken xanax in the past,would like to have a prescription for this.  Patient also needs ambien refilled, has been taking ambien for several years, denies side effects or unusual behaviors. She has tried to decrease dose to 5 mg, but was waking up during the night.           The following portions of the patient's history were reviewed and updated as appropriate: allergies, current medications, past family history, past medical history, past social history, past surgical history and problem list.    Review of Systems   Constitutional: Negative for appetite change, diaphoresis, fatigue and unexpected weight change.   Eyes: Negative for visual disturbance.   Respiratory: Positive for cough (occ) and chest tightness (occ with coughing). Negative for shortness of breath and wheezing.    Cardiovascular: Negative for chest pain, palpitations and leg swelling.   Gastrointestinal: Negative for constipation, diarrhea, nausea and vomiting.   Endocrine: Negative for polydipsia, polyphagia and polyuria.   Genitourinary: Negative for difficulty urinating and dysuria.   Skin: Negative for color change.   Neurological: Negative for dizziness, weakness, light-headedness and numbness.   Psychiatric/Behavioral: Positive for sleep disturbance (controlled with ambien ). Negative for self-injury and suicidal ideas. The patient is nervous/anxious.         Objective   Physical Exam   Constitutional: She appears well-developed and well-nourished. No distress.   HENT:   Head: Normocephalic and atraumatic.   Right Ear: External ear normal.   Left Ear: External ear normal.   Eyes: Conjunctivae are normal.   Neck: No JVD present.   Cardiovascular: Normal rate, regular rhythm, normal heart sounds and intact distal pulses.   No murmur heard.  Pulses:       Dorsalis pedis pulses are 2+ on the right side, and 2+ on the left side.        Posterior tibial pulses are 2+ on the right side, and 2+ on the left side.   Pulmonary/Chest: Effort normal and breath sounds normal. No respiratory distress.   Abdominal: Soft. She exhibits no distension. There is no tenderness.   Musculoskeletal: She exhibits no edema.   Neurological: Coordination normal.   Skin: Skin is warm and dry. No rash noted. She is not diaphoretic. No erythema. No pallor.   Psychiatric: She has a normal mood and affect.   Tearful at times   Nursing note and vitals reviewed.      Assessment/Plan   Dixie was seen today for diabetes.    Diagnoses and all orders for this visit:    Prediabetes  -     POC Glycosylated Hemoglobin (Hb A1C)    Anxiety  -     PARoxetine (PAXIL) 10 MG tablet; Take 2 tablets by mouth Every Morning. Start with one tab for first 7-10 days  -     ALPRAZolam (XANAX) 0.25 MG tablet; Take 1 tablet by mouth Daily As Needed for Anxiety.    Depression with anxiety  -     PARoxetine (PAXIL) 10 MG tablet; Take 2 tablets by mouth Every Morning. Start with one tab for first 7-10 days    Insomnia, unspecified type  -     zolpidem (AMBIEN) 10 MG tablet; Take 1 tablet by mouth At Night As Needed for Sleep. Do not take with alprazolam    Therapeutic drug monitoring  -     Urine Drug Screen - Urine, Clean Catch; Future        Results for orders placed or performed in visit on 01/15/19   POC Glycosylated Hemoglobin (Hb A1C)   Result Value Ref Range    Hemoglobin A1C 5.7 %      Prediabetes is controlled,  encouraged regular exercise, consistent carb diet  Anxiety is not controlled, prescribed Paxil, alprazolam prescribed short term to bridge therapy. Patient understands this is a one time prescription, she is to notify me if unable to tolerate Paxil, can try a different med in same or similar class. Encouraged patient to find a therapist to discuss her mental health issues, she declines at this time. Follow up in one month.   Insomnia is controlled, Ambien refilled, MADY reviewed, UDS collected. patient aware she should not take ambien and alprazolam together.

## 2019-01-18 DIAGNOSIS — Z51.81 THERAPEUTIC DRUG MONITORING: ICD-10-CM

## 2019-02-19 ENCOUNTER — OFFICE VISIT (OUTPATIENT)
Dept: FAMILY MEDICINE CLINIC | Facility: CLINIC | Age: 69
End: 2019-02-19

## 2019-02-19 VITALS
SYSTOLIC BLOOD PRESSURE: 124 MMHG | WEIGHT: 151.2 LBS | HEART RATE: 85 BPM | TEMPERATURE: 98.5 F | BODY MASS INDEX: 22.99 KG/M2 | OXYGEN SATURATION: 96 % | DIASTOLIC BLOOD PRESSURE: 84 MMHG

## 2019-02-19 DIAGNOSIS — R11.0 NAUSEA: ICD-10-CM

## 2019-02-19 DIAGNOSIS — F41.9 ANXIETY: Primary | ICD-10-CM

## 2019-02-19 DIAGNOSIS — H92.01 EAR DISCOMFORT, RIGHT: ICD-10-CM

## 2019-02-19 PROCEDURE — 99214 OFFICE O/P EST MOD 30 MIN: CPT | Performed by: NURSE PRACTITIONER

## 2019-02-19 RX ORDER — ONDANSETRON 4 MG/1
4 TABLET, FILM COATED ORAL EVERY 8 HOURS PRN
Qty: 60 TABLET | Refills: 0 | Status: SHIPPED | OUTPATIENT
Start: 2019-02-19 | End: 2020-01-16

## 2019-02-19 NOTE — PROGRESS NOTES
Subjective   Dixie Cervantes is a 68 y.o. female.   Chief Complaint   Patient presents with   • Anxiety     4 week follow-up for Paxil (doesn't like it)       History of Present Illness   Patient is here for follow up for anxiety, states she started the Paxil, has had nausea since starting it, was not able to increase to 20 mg; is still having stressors in her life. Because of the nausea she wants to stop taking the Paxil.States her son was able to post bail and get out of senior care, he is calling often asking for money; her grandson is 18, is having some issues, his mother is patient's daughter who  from drug overdose. States her  just got back from a trip and is ill, he has lost weight. She has only taken 5 and a half of the xanax prescribed last time. Does not want to see a therapist despite feeling overwhelmed.  Also has complaint of chronic ear discomfort, drops have not helped, has seen an ENT before as well as allergists, no help. Has been taking meclizine for dizziness. Is interested in seeing someone else.  The following portions of the patient's history were reviewed and updated as appropriate: allergies, current medications, past family history, past medical history, past social history, past surgical history and problem list.    Review of Systems   Constitutional: Positive for fatigue. Negative for chills and fever.   HENT: Positive for congestion and rhinorrhea (improved). Ear pain: right ear discomfort.    Respiratory: Negative for shortness of breath and wheezing.    Cardiovascular: Negative for chest pain.   Gastrointestinal: Positive for nausea. Negative for diarrhea and vomiting.   Allergic/Immunologic: Positive for environmental allergies.   Neurological: Positive for light-headedness (occ) and headaches.   Psychiatric/Behavioral: The patient is nervous/anxious.        Objective   Physical Exam   Constitutional: She is oriented to person, place, and time. She appears well-developed and  well-nourished.   HENT:   Right Ear: Tympanic membrane is retracted. Tympanic membrane is not erythematous and not bulging. No middle ear effusion.   Left Ear: Tympanic membrane is not erythematous, not retracted and not bulging.  No middle ear effusion.   Neck: No thyroid mass and no thyromegaly present.   Cardiovascular: Normal rate, regular rhythm and normal heart sounds.   Pulmonary/Chest: Effort normal.   Neurological: She is alert and oriented to person, place, and time.   Psychiatric: Her speech is normal and behavior is normal. Judgment and thought content normal. Her mood appears anxious. Her affect is not angry and not inappropriate. Cognition and memory are normal. She does not exhibit a depressed mood.   Tearful at times discussing stressors in her life She is attentive.   Nursing note and vitals reviewed.      Assessment/Plan   Dixie was seen today for anxiety.    Diagnoses and all orders for this visit:    Anxiety    Nausea  -     ondansetron (ZOFRAN) 4 MG tablet; Take 1 tablet by mouth Every 8 (Eight) Hours As Needed for Nausea or Vomiting.    Ear discomfort, right  -     Ambulatory Referral to ENT (Otolaryngology)        Anxiety is not controlled, I spent at least 30 minutes listening to patient's concerns, again stressed the importance of counseling, she has agreed to ask a [employer]ist friend who has done counseling to recommend someone.   I prescribed zofran for nausea and encouraged patient to continue Paxil if able to tolerate. She is to notify me if symptoms continue, patient is reluctant to try a different med for anxiety. She may continue xanax as needed, but explained I prefer this not become a daily medication due to side effects and dependence.   Referred to ENT for evaluation of chronic ear discomfort, this may be playing a role in nausea and dizziness.  Patient was encouraged to keep me informed of any acute changes, lack of improvement, or any new concerning symptoms.Patient voiced  understanding of all instructions and denied further questions.

## 2019-02-19 NOTE — PATIENT INSTRUCTIONS
Living With Anxiety  After being diagnosed with an anxiety disorder, you may be relieved to know why you have felt or behaved a certain way. It is natural to also feel overwhelmed about the treatment ahead and what it will mean for your life. With care and support, you can manage this condition and recover from it.  How to cope with anxiety  Dealing with stress  Stress is your body’s reaction to life changes and events, both good and bad. Stress can last just a few hours or it can be ongoing. Stress can play a major role in anxiety, so it is important to learn both how to cope with stress and how to think about it differently.  Talk with your health care provider or a counselor to learn more about stress reduction. He or she may suggest some stress reduction techniques, such as:  · Music therapy. This can include creating or listening to music that you enjoy and that inspires you.  · Mindfulness-based meditation. This involves being aware of your normal breaths, rather than trying to control your breathing. It can be done while sitting or walking.  · Centering prayer. This is a kind of meditation that involves focusing on a word, phrase, or sacred image that is meaningful to you and that brings you peace.  · Deep breathing. To do this, expand your stomach and inhale slowly through your nose. Hold your breath for 3-5 seconds. Then exhale slowly, allowing your stomach muscles to relax.  · Self-talk. This is a skill where you identify thought patterns that lead to anxiety reactions and correct those thoughts.  · Muscle relaxation. This involves tensing muscles then relaxing them.    Choose a stress reduction technique that fits your lifestyle and personality. Stress reduction techniques take time and practice. Set aside 5-15 minutes a day to do them. Therapists can offer training in these techniques. The training may be covered by some insurance plans. Other things you can do to manage stress include:  · Keeping a  stress diary. This can help you learn what triggers your stress and ways to control your response.  · Thinking about how you respond to certain situations. You may not be able to control everything, but you can control your reaction.  · Making time for activities that help you relax, and not feeling guilty about spending your time in this way.    Therapy combined with coping and stress-reduction skills provides the best chance for successful treatment.  Medicines  Medicines can help ease symptoms. Medicines for anxiety include:  · Anti-anxiety drugs.  · Antidepressants.  · Beta-blockers.    Medicines may be used as the main treatment for anxiety disorder, along with therapy, or if other treatments are not working. Medicines should be prescribed by a health care provider.  Relationships  Relationships can play a big part in helping you recover. Try to spend more time connecting with trusted friends and family members. Consider going to couples counseling, taking family education classes, or going to family therapy. Therapy can help you and others better understand the condition.  How to recognize changes in your condition  Everyone has a different response to treatment for anxiety. Recovery from anxiety happens when symptoms decrease and stop interfering with your daily activities at home or work. This may mean that you will start to:  · Have better concentration and focus.  · Sleep better.  · Be less irritable.  · Have more energy.  · Have improved memory.    It is important to recognize when your condition is getting worse. Contact your health care provider if your symptoms interfere with home or work and you do not feel like your condition is improving.  Where to find help and support:  You can get help and support from these sources:  · Self-help groups.  · Online and community organizations.  · A trusted spiritual leader.  · Couples counseling.  · Family education classes.  · Family therapy.    Follow these  instructions at home:  · Eat a healthy diet that includes plenty of vegetables, fruits, whole grains, low-fat dairy products, and lean protein. Do not eat a lot of foods that are high in solid fats, added sugars, or salt.  · Exercise. Most adults should do the following:  ? Exercise for at least 150 minutes each week. The exercise should increase your heart rate and make you sweat (moderate-intensity exercise).  ? Strengthening exercises at least twice a week.  · Cut down on caffeine, tobacco, alcohol, and other potentially harmful substances.  · Get the right amount and quality of sleep. Most adults need 7-9 hours of sleep each night.  · Make choices that simplify your life.  · Take over-the-counter and prescription medicines only as told by your health care provider.  · Avoid caffeine, alcohol, and certain over-the-counter cold medicines. These may make you feel worse. Ask your pharmacist which medicines to avoid.  · Keep all follow-up visits as told by your health care provider. This is important.  Questions to ask your health care provider  · Would I benefit from therapy?  · How often should I follow up with a health care provider?  · How long do I need to take medicine?  · Are there any long-term side effects of my medicine?  · Are there any alternatives to taking medicine?  Contact a health care provider if:  · You have a hard time staying focused or finishing daily tasks.  · You spend many hours a day feeling worried about everyday life.  · You become exhausted by worry.  · You start to have headaches, feel tense, or have nausea.  · You urinate more than normal.  · You have diarrhea.  Get help right away if:  · You have a racing heart and shortness of breath.  · You have thoughts of hurting yourself or others.  If you ever feel like you may hurt yourself or others, or have thoughts about taking your own life, get help right away. You can go to your nearest emergency department or call:  · Your local emergency  services (911 in the [place]  · A suicide crisis helpline, such as the National Suicide Prevention Lifeline at 1-450.670.8791. This is open 24-hours a day.    Summary  · Taking steps to deal with stress can help calm you.  · Medicines cannot cure anxiety disorders, but they can help ease symptoms.  · Family, friends, and partners can play a big part in helping you recover from an anxiety disorder.  This information is not intended to replace advice given to you by your health care provider. Make sure you discuss any questions you have with your health care provider.  Document Released: 12/12/2017 Document Revised: 12/12/2017 Document Reviewed: 12/12/2017  [employer] Interactive Patient Education © 2018 [employer]    Generalized Anxiety Disorder, Adult  Generalized anxiety disorder (OCTAVIO) is a mental health disorder. People with this condition constantly worry about everyday events. Unlike normal anxiety, worry related to OCTAVIO is not triggered by a specific event. These worries also do not fade or get better with time. OCTAVIO interferes with life functions, including relationships, work, and school.  OCTAVIO can vary from mild to severe. People with severe OCTAVIO can have intense waves of anxiety with physical symptoms (panic attacks).  What are the causes?  The exact cause of OCTAVIO is not known.  What increases the risk?  This condition is more likely to develop in:  · Women.  · People who have a family history of anxiety disorders.  · People who are very shy.  · People who experience very stressful life events, such as the death of a loved one.  · People who have a very stressful family environment.    What are the signs or symptoms?  People with OCTAVIO often worry excessively about many things in their lives, such as their health and family. They may also be overly concerned about:  · Doing well at work.  · Being on time.  · Natural disasters.  · Friendships.    Physical symptoms of OCTAVIO include:  · Fatigue.  · Muscle tension or  having muscle twitches.  · Trembling or feeling shaky.  · Being easily startled.  · Feeling like your heart is pounding or racing.  · Feeling out of breath or like you cannot take a deep breath.  · Having trouble falling asleep or staying asleep.  · Sweating.  · Nausea, diarrhea, or irritable bowel syndrome (IBS).  · Headaches.  · Trouble concentrating or remembering facts.  · Restlessness.  · Irritability.    How is this diagnosed?  Your health care provider can diagnose OCTAVIO based on your symptoms and medical history. You will also have a physical exam. The health care provider will ask specific questions about your symptoms, including how severe they are, when they started, and if they come and go. Your health care provider may ask you about your use of alcohol or drugs, including prescription medicines. Your health care provider may refer you to a mental health specialist for further evaluation.  Your health care provider will do a thorough examination and may perform additional tests to rule out other possible causes of your symptoms.  To be diagnosed with OCTAVIO, a person must have anxiety that:  · Is out of his or her control.  · Affects several different aspects of his or her life, such as work and relationships.  · Causes distress that makes him or her unable to take part in normal activities.  · Includes at least three physical symptoms of OCTAVIO, such as restlessness, fatigue, trouble concentrating, irritability, muscle tension, or sleep problems.    Before your health care provider can confirm a diagnosis of OCTAVIO, these symptoms must be present more days than they are not, and they must last for six months or longer.  How is this treated?  The following therapies are usually used to treat OCTAVIO:  · Medicine. Antidepressant medicine is usually prescribed for long-term daily control. Antianxiety medicines may be added in severe cases, especially when panic attacks occur.  · Talk therapy (psychotherapy). Certain  types of talk therapy can be helpful in treating OCTAVIO by providing support, education, and guidance. Options include:  ? Cognitive behavioral therapy (CBT). People learn coping skills and techniques to ease their anxiety. They learn to identify unrealistic or negative thoughts and behaviors and to replace them with positive ones.  ? Acceptance and commitment therapy (ACT). This treatment teaches people how to be mindful as a way to cope with unwanted thoughts and feelings.  ? Biofeedback. This process trains you to manage your body's response (physiological response) through breathing techniques and relaxation methods. You will work with a therapist while machines are used to monitor your physical symptoms.  · Stress management techniques. These include yoga, meditation, and exercise.    A mental health specialist can help determine which treatment is best for you. Some people see improvement with one type of therapy. However, other people require a combination of therapies.  Follow these instructions at home:  · Take over-the-counter and prescription medicines only as told by your health care provider.  · Try to maintain a normal routine.  · Try to anticipate stressful situations and allow extra time to manage them.  · Practice any stress management or self-calming techniques as taught by your health care provider.  · Do not punish yourself for setbacks or for not making progress.  · Try to recognize your accomplishments, even if they are small.  · Keep all follow-up visits as told by your health care provider. This is important.  Contact a health care provider if:  · Your symptoms do not get better.  · Your symptoms get worse.  · You have signs of depression, such as:  ? A persistently sad, cranky, or irritable mood.  ? Loss of enjoyment in activities that used to bring you john.  ? Change in weight or eating.  ? Changes in sleeping habits.  ? Avoiding friends or family members.  ? Loss of energy for normal  tasks.  ? Feelings of guilt or worthlessness.  Get help right away if:  · You have serious thoughts about hurting yourself or others.  If you ever feel like you may hurt yourself or others, or have thoughts about taking your own life, get help right away. You can go to your nearest emergency department or call:  · Your local emergency services (911 in the [place]  · A suicide crisis helpline, such as the National Suicide Prevention Lifeline at 1-755.678.2464. This is open 24 hours a day.    Summary  · Generalized anxiety disorder (OCTAVIO) is a mental health disorder that involves worry that is not triggered by a specific event.  · People with OCTAVIO often worry excessively about many things in their lives, such as their health and family.  · OCTAVIO may cause physical symptoms such as restlessness, trouble concentrating, sleep problems, frequent sweating, nausea, diarrhea, headaches, and trembling or muscle twitching.  · A mental health specialist can help determine which treatment is best for you. Some people see improvement with one type of therapy. However, other people require a combination of therapies.  This information is not intended to replace advice given to you by your health care provider. Make sure you discuss any questions you have with your health care provider.  Document Released: 04/14/2014 Document Revised: 11/07/2017 Document Reviewed: 11/07/2017  [employer] Interactive Patient Education © 2018 [employer]    Nausea, Adult  Feeling sick to your stomach (nausea) means that your stomach is upset or you feel like you have to throw up (vomit). Feeling sick to your stomach is usually not serious, but it may be an early sign of a more serious medical problem. As you feel sicker to your stomach, it can lead to throwing up (vomiting). If you throw up, or if you are not able to drink enough fluids, there is a risk of dehydration. Dehydration can make you feel tired and thirsty, have a dry mouth, and pee (urinate)  less often. Older adults and people who have other diseases or a weak defense (immune) system have a higher risk of dehydration.  The main goal of treating this condition is to:  · Limit how often you feel sick to your stomach.  · Prevent throwing up and dehydration.    Follow these instructions at home:  Follow instructions from your doctor about how to care for yourself at home.  Eating and drinking  Follow these recommendations as told by your doctor:  · Take an oral rehydration solution (ORS). This is a drink that is sold at pharmacies and stores.  · Drink clear fluids in small amounts as you are able, such as:  ? Water.  ? Ice chips.  ? Fruit juice that has water added (diluted fruit juice).  ? Low-calorie sports drinks.  · Eat bland, easy to digest foods in small amounts as you are able, such as:  ? Bananas.  ? Applesauce.  ? Rice.  ? Lean meats.  ? Toast.  ? Crackers.  · Avoid drinking fluids that contain a lot of sugar or caffeine.  · Avoid alcohol.  · Avoid spicy or fatty foods.    General instructions  · Drink enough fluid to keep your pee (urine) clear or pale yellow.  · Wash your hands often. If you cannot use soap and water, use hand .  · Make sure that all people in your household wash their hands well and often.  · Rest at home while you get better.  · Take over-the-counter and prescription medicines only as told by your doctor.  · Breathe slowly and deeply when you feel sick to your stomach.  · Watch your condition for any changes.  · Keep all follow-up visits as told by your doctor. This is important.  Contact a doctor if:  · You have a headache.  · You have new symptoms.  · You feel sicker to your stomach.  · You have a fever.  · You feel light-headed or dizzy.  · You throw up.  · You are not able to keep fluids down.  Get help right away if:  · You have pain in your chest, neck, arm, or jaw.  · You feel very weak or you pass out (faint).  · You have throw up that is bright red or looks  like coffee grounds.  · You have bloody or black poop (stools), or poop that looks like tar.  · You have a very bad headache, a stiff neck, or both.  · You have very bad pain, cramping, or bloating in your belly.  · You have a rash.  · You have trouble breathing or you are breathing very quickly.  · Your heart is beating very quickly.  · Your skin feels cold and clammy.  · You feel confused.  · You have pain while peeing.  · You have signs of dehydration, such as:  ? Dark pee, or very little or no pee.  ? Cracked lips.  ? Dry mouth.  ? Sunken eyes.  ? Sleepiness.  ? Weakness.  These symptoms may be an emergency. Do not wait to see if the symptoms will go away. Get medical help right away. Call your local emergency services (911 in the [place] Do not drive yourself to the hospital.  This information is not intended to replace advice given to you by your health care provider. Make sure you discuss any questions you have with your health care provider.  Document Released: 12/06/2012 Document Revised: 05/25/2017 Document Reviewed: 08/23/2016  [employer] Interactive Patient Education © 2018 [employer]

## 2019-02-20 DIAGNOSIS — F41.9 ANXIETY: ICD-10-CM

## 2019-02-20 RX ORDER — ALPRAZOLAM 0.25 MG/1
0.12 TABLET ORAL DAILY PRN
Qty: 15 TABLET | Refills: 0 | Status: SHIPPED | OUTPATIENT
Start: 2019-02-20 | End: 2019-07-16 | Stop reason: SDUPTHER

## 2019-02-20 NOTE — TELEPHONE ENCOUNTER
LAST VISIT:2/19/19  NEXT VISIT:4/15/19  LAST FILL:1/15/19  MADY:10/15/18  UDS:1/18/19  CSA:10/15/18

## 2019-04-09 ENCOUNTER — TELEPHONE (OUTPATIENT)
Dept: FAMILY MEDICINE CLINIC | Facility: CLINIC | Age: 69
End: 2019-04-09

## 2019-04-09 DIAGNOSIS — R73.03 PREDIABETES: ICD-10-CM

## 2019-04-09 DIAGNOSIS — E55.9 VITAMIN D DEFICIENCY: ICD-10-CM

## 2019-04-09 DIAGNOSIS — Z00.00 MEDICARE ANNUAL WELLNESS VISIT, SUBSEQUENT: Primary | ICD-10-CM

## 2019-04-10 ENCOUNTER — LAB (OUTPATIENT)
Dept: LAB | Facility: HOSPITAL | Age: 69
End: 2019-04-10

## 2019-04-10 DIAGNOSIS — E55.9 VITAMIN D DEFICIENCY: ICD-10-CM

## 2019-04-10 DIAGNOSIS — R73.03 PREDIABETES: ICD-10-CM

## 2019-04-10 DIAGNOSIS — Z00.00 MEDICARE ANNUAL WELLNESS VISIT, SUBSEQUENT: ICD-10-CM

## 2019-04-10 LAB
25(OH)D3 SERPL-MCNC: 37.9 NG/ML (ref 30–100)
ALBUMIN SERPL-MCNC: 4.1 G/DL (ref 3.5–5.2)
ALBUMIN/GLOB SERPL: 1.3 G/DL
ALP SERPL-CCNC: 70 U/L (ref 39–117)
ALT SERPL W P-5'-P-CCNC: 18 U/L (ref 1–33)
ANION GAP SERPL CALCULATED.3IONS-SCNC: 14.4 MMOL/L
AST SERPL-CCNC: 17 U/L (ref 1–32)
BASOPHILS # BLD AUTO: 0.03 10*3/MM3 (ref 0–0.2)
BASOPHILS NFR BLD AUTO: 0.4 % (ref 0–1.5)
BILIRUB SERPL-MCNC: 0.8 MG/DL (ref 0.2–1.2)
BUN BLD-MCNC: 13 MG/DL (ref 8–23)
BUN/CREAT SERPL: 14 (ref 7–25)
CALCIUM SPEC-SCNC: 10.5 MG/DL (ref 8.6–10.5)
CHLORIDE SERPL-SCNC: 103 MMOL/L (ref 98–107)
CHOLEST SERPL-MCNC: 191 MG/DL (ref 0–200)
CO2 SERPL-SCNC: 25.6 MMOL/L (ref 22–29)
CREAT BLD-MCNC: 0.93 MG/DL (ref 0.57–1)
DEPRECATED RDW RBC AUTO: 43.3 FL (ref 37–54)
EOSINOPHIL # BLD AUTO: 0.1 10*3/MM3 (ref 0–0.4)
EOSINOPHIL NFR BLD AUTO: 1.4 % (ref 0.3–6.2)
ERYTHROCYTE [DISTWIDTH] IN BLOOD BY AUTOMATED COUNT: 12.7 % (ref 12.3–15.4)
GFR SERPL CREATININE-BSD FRML MDRD: 60 ML/MIN/1.73
GLOBULIN UR ELPH-MCNC: 3.2 GM/DL
GLUCOSE BLD-MCNC: 103 MG/DL (ref 65–99)
HBA1C MFR BLD: 5.7 % (ref 4.8–5.6)
HCT VFR BLD AUTO: 47.7 % (ref 34–46.6)
HDLC SERPL-MCNC: 54 MG/DL (ref 40–60)
HGB BLD-MCNC: 15.2 G/DL (ref 12–15.9)
IMM GRANULOCYTES # BLD AUTO: 0.02 10*3/MM3 (ref 0–0.05)
IMM GRANULOCYTES NFR BLD AUTO: 0.3 % (ref 0–0.5)
LDLC SERPL CALC-MCNC: 106 MG/DL (ref 0–100)
LDLC/HDLC SERPL: 1.96 {RATIO}
LYMPHOCYTES # BLD AUTO: 2.87 10*3/MM3 (ref 0.7–3.1)
LYMPHOCYTES NFR BLD AUTO: 39.1 % (ref 19.6–45.3)
MCH RBC QN AUTO: 29.6 PG (ref 26.6–33)
MCHC RBC AUTO-ENTMCNC: 31.9 G/DL (ref 31.5–35.7)
MCV RBC AUTO: 93 FL (ref 79–97)
MONOCYTES # BLD AUTO: 0.73 10*3/MM3 (ref 0.1–0.9)
MONOCYTES NFR BLD AUTO: 9.9 % (ref 5–12)
NEUTROPHILS # BLD AUTO: 3.59 10*3/MM3 (ref 1.4–7)
NEUTROPHILS NFR BLD AUTO: 48.9 % (ref 42.7–76)
NRBC BLD AUTO-RTO: 0 /100 WBC (ref 0–0)
PLATELET # BLD AUTO: 277 10*3/MM3 (ref 140–450)
PMV BLD AUTO: 10.8 FL (ref 6–12)
POTASSIUM BLD-SCNC: 5.3 MMOL/L (ref 3.5–5.2)
PROT SERPL-MCNC: 7.3 G/DL (ref 6–8.5)
RBC # BLD AUTO: 5.13 10*6/MM3 (ref 3.77–5.28)
SODIUM BLD-SCNC: 143 MMOL/L (ref 136–145)
TRIGL SERPL-MCNC: 155 MG/DL (ref 0–150)
TSH SERPL DL<=0.05 MIU/L-ACNC: 3.01 MIU/ML (ref 0.27–4.2)
VLDLC SERPL-MCNC: 31 MG/DL (ref 5–40)
WBC NRBC COR # BLD: 7.34 10*3/MM3 (ref 3.4–10.8)

## 2019-04-10 PROCEDURE — 80053 COMPREHEN METABOLIC PANEL: CPT | Performed by: NURSE PRACTITIONER

## 2019-04-10 PROCEDURE — 84443 ASSAY THYROID STIM HORMONE: CPT | Performed by: NURSE PRACTITIONER

## 2019-04-10 PROCEDURE — 80061 LIPID PANEL: CPT | Performed by: NURSE PRACTITIONER

## 2019-04-10 PROCEDURE — 82306 VITAMIN D 25 HYDROXY: CPT | Performed by: NURSE PRACTITIONER

## 2019-04-10 PROCEDURE — 83036 HEMOGLOBIN GLYCOSYLATED A1C: CPT | Performed by: NURSE PRACTITIONER

## 2019-04-10 PROCEDURE — 85025 COMPLETE CBC W/AUTO DIFF WBC: CPT | Performed by: NURSE PRACTITIONER

## 2019-04-14 NOTE — PROGRESS NOTES
"Subsequent Medicare Wellness Visit   The ABC's of the Annual Wellness Visit    Chief Complaint   Patient presents with   • Medicare Wellness-subsequent       HPI:  Dixie Cervantes, -1950, is a 68 y.o. female who presents for a Subsequent Medicare Wellness Visit.  Patient is also here to follow up for insomnia, has been on Ambien for several years, denies adverse side effects or abnormal behaviors, has tried a lower dose (5 mg), but was not able to sleep.  Also has been taking Paxil for anxiety with prn xanax for break through anxiety while adjusting to Paxil. First trial of Paxil resulted in nausea, patient did not want to continue at first, but also did not want to try another SSRI either, has continued taking one 10 mg, will try to advance to 1.5, then 2 tabs, feels like this is helping her depression and anxiety. Is \"not crying as much\", mood more stable.   Also wants to have her neck wound looked at, had sebaceous cyst removed recently at dermatologist.  Recent Hospitalizations:  No hospitalization(s) within the last year..    Current Medical Providers:  Patient Care Team:  Linda Adhikari APRN as PCP - General (Nurse Practitioner)  Camden Franco MD (Neurosurgery)  Jason Pino MD as Consulting Physician (Pulmonary Disease)  Fady Lane MD as Consulting Physician (Otolaryngology)    Health Habits and Functional and Cognitive Screening and Depression Screening:  Functional & Cognitive Status 4/15/2019   Do you have difficulty preparing food and eating? No   Do you have difficulty bathing yourself, getting dressed or grooming yourself? No   Do you have difficulty using the toilet? No   Do you have difficulty moving around from place to place? No   Do you have trouble with steps or getting out of a bed or a chair? No   In the past year have you fallen or experienced a near fall? No   Current Diet Well Balanced Diet   Dental Exam Up to date   Eye Exam Up to date   Exercise " (times per week) 5 times per week   Current Exercise Activities Include Swimming   Do you need help using the phone?  No   Are you deaf or do you have serious difficulty hearing?  No   Do you need help with transportation? No   Do you need help shopping? No   Do you need help preparing meals?  No   Do you need help with housework?  No   Do you need help with laundry? No   Do you need help taking your medications? No   Do you need help managing money? No   Do you ever drive or ride in a car without wearing a seat belt? No   Have you felt unusual stress, anger or loneliness in the last month? No   Who do you live with? Spouse   If you need help, do you have trouble finding someone available to you? No   Have you been bothered in the last four weeks by sexual problems? No   Do you have difficulty concentrating, remembering or making decisions? Yes       Compared to one year ago, the patient feels her physical health is better and her mental health is better.    Depression Screen:  PHQ-2/PHQ-9 Depression Screening 4/15/2019   Little interest or pleasure in doing things 0   Feeling down, depressed, or hopeless 0   Total Score 0         Past Medical/Family/Social History:  The following portions of the patient's history were reviewed and updated as appropriate: allergies, current medications, past family history, past medical history, past social history, past surgical history and problem list.    Allergies   Allergen Reactions   • Sulfa Antibiotics Swelling   • Ketek [Telithromycin] Other (See Comments)     Migraine   • Bupropion Irritability         Current Outpatient Medications:   •  ALPRAZolam (XANAX) 0.25 MG tablet, Take 0.5 tablets by mouth Daily As Needed for Anxiety., Disp: 15 tablet, Rfl: 0  •  cyclobenzaprine (FLEXERIL) 10 MG tablet, Take 10 mg by mouth As Needed for Muscle Spasms., Disp: , Rfl:   •  diclofenac (VOLTAREN) 1 % gel gel, Apply 4 g topically to the appropriate area as directed 4 (Four) Times a Day  As Needed., Disp: , Rfl:   •  glucose blood test strip, Test twice daily    E11.9   Contour Next One, Disp: 100 each, Rfl: 12  •  Lancets (TGT LANCET MICRO THIN 33G) misc, Test Twice Daily    E11.9, Disp: 100 each, Rfl: 11  •  lidocaine (LIDODERM) 5 %, Place 1 patch on the skin as directed by provider Daily. Remove & Discard patch within 12 hours or as directed by MD, Disp: , Rfl:   •  Multiple Vitamins-Minerals (MULTI VITAMIN/MINERALS) tablet, Take 1 tablet by mouth., Disp: , Rfl:   •  neomycin-colistin-hydrocortisone-thonzonium (CORTISPORIN TC) 3.3-3-10-0.5 MG/ML otic suspension, Administer 3 drops to the right ear 4 (Four) Times a Day., Disp: 10 mL, Rfl: 1  •  ondansetron (ZOFRAN) 4 MG tablet, Take 1 tablet by mouth Every 8 (Eight) Hours As Needed for Nausea or Vomiting., Disp: 60 tablet, Rfl: 0  •  PARoxetine (PAXIL) 10 MG tablet, Take 2 tablets by mouth Every Morning. increase to 1.5 tabs, Disp: 60 tablet, Rfl: 1  •  traMADol (ULTRAM) 50 MG tablet, Take 50 mg by mouth As Needed for Moderate Pain ., Disp: , Rfl:   •  zolpidem (AMBIEN) 10 MG tablet, Take 1 tablet by mouth At Night As Needed for Sleep. Do not take with alprazolam, Disp: 30 tablet, Rfl: 2    Aspirin use counseling: Contraindicated from taking ASA    Current medication list contains high risk medications. No harmful drug interactions have been identified.  Plan of action discussed risks and benefits, patient wants to continue ambien and xanax prn. tring to wean off xanax    Family History   Problem Relation Age of Onset   • Diabetes Mother    • Arthritis Mother    • Migraines Mother    • Stroke Mother    • Prostate cancer Father        Social History     Tobacco Use   • Smoking status: Never Smoker   • Smokeless tobacco: Never Used   Substance Use Topics   • Alcohol use: Yes     Comment: 2-3 drinks a month       Past Surgical History:   Procedure Laterality Date   • APPENDECTOMY     • BREAST CYST ASPIRATION Right    • CHOLECYSTECTOMY     • CYST  REMOVAL      Multiple Scalp cysts   • CYST REMOVAL  2019    Located on neck   • CYSTOSCOPY     • LAPAROTOMY OOPHERECTOMY Right    • MOLE REMOVAL      Multiple   • THYROID BIOPSY      Needle biopsy       Patient Active Problem List   Diagnosis   • Vestibular vertigo   • Urinary, incontinence, stress female   • Parasitic infection   • Irritable bowel syndrome (IBS)   • Inverted nipple   • Insomnia   • History of bladder infections   • GERD (gastroesophageal reflux disease)   • OCTAVIO (generalized anxiety disorder)   • External otitis   • Esophageal spasm   • Diverticulosis   • Chronic low back pain   • Chronic cough       Review of Systems   Constitutional: Negative for activity change, appetite change, chills, diaphoresis, fatigue, fever and unexpected weight change.   HENT: Positive for hearing loss (slight). Negative for congestion, ear discharge, ear pain and trouble swallowing.    Eyes: Positive for visual disturbance (cornea tear, sees eye DR). Negative for photophobia, pain, discharge, redness and itching.   Respiratory: Positive for cough (occ) and chest tightness (occ anxiety). Negative for shortness of breath and wheezing.    Cardiovascular: Positive for chest pain (occ). Negative for palpitations and leg swelling.   Gastrointestinal: Positive for nausea (occ). Negative for abdominal distention, abdominal pain, blood in stool, constipation, diarrhea (occ loose stool) and vomiting.   Endocrine: Negative for polydipsia and polyuria.   Genitourinary: Positive for frequency. Negative for difficulty urinating (occ), dysuria and vaginal bleeding (participates in [facility] ovarain CA screening).   Musculoskeletal: Positive for arthralgias and back pain. Negative for neck pain and neck stiffness.   Skin: Positive for wound (recent cyst removal in neck.).   Allergic/Immunologic: Positive for environmental allergies.   Neurological: Negative for dizziness, weakness, light-headedness and headaches.   Psychiatric/Behavioral:  "Positive for dysphoric mood (occ) and sleep disturbance. The patient is nervous/anxious.        Objective     Vitals:    04/15/19 0836   BP: 134/84   Pulse: 91   SpO2: 98%   Weight: 70.3 kg (155 lb)   Height: 172.7 cm (68\")   PainSc: 0-No pain       Patient's Body mass index is 23.57 kg/m². BMI is within normal parameters. No follow-up required..      No exam data present    The patient has no evidence of cognitve impairment.  The patient has no evidence of cognitive impairment. Clock drawing test was normal. (see scanned document)    Physical Exam   Constitutional: She is oriented to person, place, and time. She appears well-developed and well-nourished. No distress.   HENT:   Head: Normocephalic and atraumatic.   Right Ear: External ear normal.   Left Ear: External ear normal.   Mouth/Throat: Oropharynx is clear and moist. No oropharyngeal exudate.   Eyes: Conjunctivae and EOM are normal. Pupils are equal, round, and reactive to light. No scleral icterus.   Neck: Normal range of motion. Neck supple. No thyromegaly present.   Cardiovascular: Normal rate, regular rhythm, normal heart sounds and intact distal pulses.   No murmur heard.  No bruit   Pulmonary/Chest: Effort normal and breath sounds normal. No stridor. No respiratory distress. She has no wheezes. She has no rales. She exhibits no tenderness. Right breast exhibits no inverted nipple, no mass, no nipple discharge, no skin change and no tenderness. Left breast exhibits no inverted nipple, no mass, no nipple discharge, no skin change and no tenderness. Breasts are symmetrical.   Abdominal: Soft. Bowel sounds are normal. She exhibits no distension and no mass. There is no tenderness. There is no rebound and no guarding. No hernia.   Musculoskeletal: She exhibits no edema, tenderness or deformity.   Normal ROM of major joints   Lymphadenopathy:     She has no cervical adenopathy.   Neurological: She is alert and oriented to person, place, and time. No cranial " nerve deficit. Coordination normal.   Skin: Skin is warm and dry. Capillary refill takes less than 2 seconds. She is not diaphoretic.   Neck wound, well approximated, sutures in tact, no s/s of infection   Psychiatric: She has a normal mood and affect. Her behavior is normal. Judgment and thought content normal.   Vitals reviewed.      Recent Lab Results:     Lab Results   Component Value Date    CHOL 191 04/10/2019    TRIG 155 (H) 04/10/2019    HDL 54 04/10/2019    VLDL 31 04/10/2019    LDLHDL 1.96 04/10/2019       Assessment/Plan   Age-appropriate Screening Schedule:  Refer to the list below for future screening recommendations based on patient's age, sex and/or medical conditions.      Health Maintenance   Topic Date Due   • ZOSTER VACCINE (2 of 3) 02/26/2014   • INFLUENZA VACCINE  08/01/2019   • MAMMOGRAM  03/29/2020   • LIPID PANEL  04/10/2020   • TDAP/TD VACCINES (2 - Td) 01/01/2024   • COLONOSCOPY  10/05/2028   • PNEUMOCOCCAL VACCINES (65+ LOW/MEDIUM RISK)  Completed       Medicare Risks and Personalized Health Plan:  Polypharmacy; Patient advised to followup with her primary care provider       CMS-Preventive Services Quick Reference  Medicare Preventive Services Addressed:  Annual Wellness Visit (AWV)  Depression Screening (15 minutes face to face, Code )    Advance Care Planning:  Patient has an advance directive - a copy has been provided and is visible in patient header    Diagnoses and all orders for this visit:    1. Prediabetes (Primary)  -     Lancets (TGT LANCET MICRO THIN 33G) misc; Test Twice Daily    E11.9  Dispense: 100 each; Refill: 11  -     glucose blood test strip; Test twice daily    E11.9   Contour Next One  Dispense: 100 each; Refill: 12    2. Anxiety  -     PARoxetine (PAXIL) 10 MG tablet; Take 2 tablets by mouth Every Morning. increase to 1.5 tabs  Dispense: 60 tablet; Refill: 1    3. Depression with anxiety  -     PARoxetine (PAXIL) 10 MG tablet; Take 2 tablets by mouth Every  Morning. increase to 1.5 tabs  Dispense: 60 tablet; Refill: 1    4. Insomnia, unspecified type  -     zolpidem (AMBIEN) 10 MG tablet; Take 1 tablet by mouth At Night As Needed for Sleep. Do not take with alprazolam  Dispense: 30 tablet; Refill: 2    Other orders  -     Cancel: ALPRAZolam (XANAX) 0.25 MG tablet; Take 0.5 tablets by mouth Daily As Needed for Anxiety.  Dispense: 15 tablet; Refill: 0      I reviewed lab results with patient, done prior to appt. Prediabetes is controlled, continue with glucose monitoring, dietary control. Avoid high potassium foods or supplements with potassium. Do not take iron supplement  Anxiety and depression are stable, patient will try to increase dose, work on weaning off xanax. Declines trying Lexapro.  Insomnia is controlled, refilled Ambien. MADY reviewed.  An After Visit Summary and PPPS with all of these plans were given to the patient.      Follow Up:  No Follow-up on file.                    ECG 12 Lead  Date/Time: 4/15/2019 4:26 PM  Performed by: Linda Adhikari APRN  Authorized by: Linda Adhikari APRN   Comparison: compared with previous ECG from 12/7/2017  Rhythm: sinus rhythm  Rate: normal  Conduction: conduction normal  ST Segments: ST segments normal  QRS axis: normal    Clinical impression: normal ECG

## 2019-04-15 ENCOUNTER — TELEPHONE (OUTPATIENT)
Dept: FAMILY MEDICINE CLINIC | Facility: CLINIC | Age: 69
End: 2019-04-15

## 2019-04-15 ENCOUNTER — OFFICE VISIT (OUTPATIENT)
Dept: FAMILY MEDICINE CLINIC | Facility: CLINIC | Age: 69
End: 2019-04-15

## 2019-04-15 VITALS
HEIGHT: 68 IN | SYSTOLIC BLOOD PRESSURE: 134 MMHG | HEART RATE: 91 BPM | OXYGEN SATURATION: 98 % | BODY MASS INDEX: 23.49 KG/M2 | WEIGHT: 155 LBS | DIASTOLIC BLOOD PRESSURE: 84 MMHG

## 2019-04-15 DIAGNOSIS — R07.9 CHEST PAIN OF UNKNOWN ETIOLOGY: ICD-10-CM

## 2019-04-15 DIAGNOSIS — F41.8 DEPRESSION WITH ANXIETY: ICD-10-CM

## 2019-04-15 DIAGNOSIS — F41.9 ANXIETY: ICD-10-CM

## 2019-04-15 DIAGNOSIS — G47.00 INSOMNIA, UNSPECIFIED TYPE: ICD-10-CM

## 2019-04-15 DIAGNOSIS — R73.03 PREDIABETES: Primary | ICD-10-CM

## 2019-04-15 PROCEDURE — G0439 PPPS, SUBSEQ VISIT: HCPCS | Performed by: NURSE PRACTITIONER

## 2019-04-15 PROCEDURE — 93000 ELECTROCARDIOGRAM COMPLETE: CPT | Performed by: NURSE PRACTITIONER

## 2019-04-15 RX ORDER — ALPRAZOLAM 0.25 MG/1
0.12 TABLET ORAL DAILY PRN
Qty: 15 TABLET | Refills: 0 | Status: CANCELLED | OUTPATIENT
Start: 2019-04-15

## 2019-04-15 RX ORDER — PAROXETINE 10 MG/1
20 TABLET, FILM COATED ORAL EVERY MORNING
Qty: 60 TABLET | Refills: 1 | Status: SHIPPED | OUTPATIENT
Start: 2019-04-15 | End: 2019-04-15 | Stop reason: SDUPTHER

## 2019-04-15 RX ORDER — ZOLPIDEM TARTRATE 10 MG/1
10 TABLET ORAL NIGHTLY PRN
Qty: 30 TABLET | Refills: 2 | Status: SHIPPED | OUTPATIENT
Start: 2019-04-15 | End: 2019-07-16 | Stop reason: SDUPTHER

## 2019-04-15 RX ORDER — LANCETS 33 GAUGE
EACH MISCELLANEOUS
Qty: 100 EACH | Refills: 11 | Status: SHIPPED | OUTPATIENT
Start: 2019-04-15 | End: 2020-01-16

## 2019-04-15 RX ORDER — PAROXETINE 10 MG/1
20 TABLET, FILM COATED ORAL EVERY MORNING
Qty: 60 TABLET | Refills: 1 | Status: SHIPPED | OUTPATIENT
Start: 2019-04-15 | End: 2019-07-16 | Stop reason: SINTOL

## 2019-04-15 NOTE — PATIENT INSTRUCTIONS
"Heart-Healthy Eating Plan  Heart-healthy meal planning includes:  · Limiting unhealthy fats.  · Increasing healthy fats.  · Making other small dietary changes.    You may need to talk with your doctor or a diet specialist (dietitian) to create an eating plan that is right for you.  What types of fat should I choose?  · Choose healthy fats. These include olive oil and canola oil, flaxseeds, walnuts, almonds, and seeds.  · Eat more omega-3 fats. These include salmon, mackerel, sardines, tuna, flaxseed oil, and ground flaxseeds. Try to eat fish at least twice each week.  · Limit saturated fats.  ? Saturated fats are often found in animal products, such as meats, butter, and cream.  ? Plant sources of saturated fats include palm oil, palm kernel oil, and coconut oil.  · Avoid foods with partially hydrogenated oils in them. These include stick margarine, some tub margarines, cookies, crackers, and other baked goods. These contain trans fats.  What general guidelines do I need to follow?  · Check food labels carefully. Identify foods with trans fats or high amounts of saturated fat.  · Fill one half of your plate with vegetables and green salads. Eat 4-5 servings of vegetables per day. A serving of vegetables is:  ? 1 cup of raw leafy vegetables.  ? ½ cup of raw or cooked cut-up vegetables.  ? ½ cup of vegetable juice.  · Fill one fourth of your plate with whole grains. Look for the word \"whole\" as the first word in the ingredient list.  · Fill one fourth of your plate with lean protein foods.  · Eat 4-5 servings of fruit per day. A serving of fruit is:  ? One medium whole fruit.  ? ¼ cup of dried fruit.  ? ½ cup of fresh, frozen, or canned fruit.  ? ½ cup of 100% fruit juice.  · Eat more foods that contain soluble fiber. These include apples, broccoli, carrots, beans, peas, and barley. Try to get 20-30 g of fiber per day.  · Eat more home-cooked food. Eat less restaurant, buffet, and fast food.  · Limit or avoid " alcohol.  · Limit foods high in starch and sugar.  · Avoid fried foods.  · Avoid frying your food. Try baking, boiling, grilling, or broiling it instead. You can also reduce fat by:  ? Removing the skin from poultry.  ? Removing all visible fats from meats.  ? Skimming the fat off of stews, soups, and gravies before serving them.  ? Steaming vegetables in water or broth.  · Lose weight if you are overweight.  · Eat 4-5 servings of nuts, legumes, and seeds per week:  ? One serving of dried beans or legumes equals ½ cup after being cooked.  ? One serving of nuts equals 1½ ounces.  ? One serving of seeds equals ½ ounce or one tablespoon.  · You may need to keep track of how much salt or sodium you eat. This is especially true if you have high blood pressure. Talk with your doctor or dietitian to get more information.  What foods can I eat?  Grains  Breads, including Dominican, white, tenzin, wheat, raisin, rye, oatmeal, and Italian. Tortillas that are neither fried nor made with lard or trans fat. Low-fat rolls, including hotdog and hamburger buns and English muffins. Biscuits. Muffins. Waffles. Pancakes. Light popcorn. Whole-grain cereals. Flatbread. [place] toast. Pretzels. Breadsticks. Rusks. Low-fat snacks. Low-fat crackers, including oyster, saltine, matzo, jonatan, animal, and rye. Rice and pasta, including brown rice and pastas that are made with whole wheat.  Vegetables  All vegetables.  Fruits  All fruits, but limit coconut.  Meats and Other Protein Sources  Lean, well-trimmed beef, veal, pork, and lamb. Chicken and turkey without skin. All fish and shellfish. Wild duck, rabbit, pheasant, and venison. Egg whites or low-cholesterol egg substitutes. Dried beans, peas, lentils, and tofu. Seeds and most nuts.  Dairy  Low-fat or nonfat cheeses, including ricotta, string, and mozzarella. Skim or 1% milk that is liquid, powdered, or evaporated. Buttermilk that is made with low-fat milk. Nonfat or low-fat  yogurt.  Beverages  Mineral water. Diet carbonated beverages.  Sweets and Desserts  Sherbets and fruit ices. Honey, jam, marmalade, jelly, and syrups. Meringues and gelatins. Pure sugar candy, such as hard candy, jelly beans, gumdrops, mints, marshmallows, and small amounts of dark chocolate. Leonid food cake.  Eat all sweets and desserts in moderation.  Fats and Oils  Nonhydrogenated (trans-free) margarines. Vegetable oils, including soybean, sesame, sunflower, olive, peanut, safflower, corn, canola, and cottonseed. Salad dressings or mayonnaise made with a vegetable oil. Limit added fats and oils that you use for cooking, baking, salads, and as spreads.  Other  Cocoa powder. Coffee and tea. All seasonings and condiments.  The items listed above may not be a complete list of recommended foods or beverages. Contact your dietitian for more options.  What foods are not recommended?  Grains  Breads that are made with saturated or trans fats, oils, or whole milk. Croissants. Butter rolls. Cheese breads. Sweet rolls. Donuts. Buttered popcorn. Chow mein noodles. High-fat crackers, such as cheese or butter crackers.  Meats and Other Protein Sources  Fatty meats, such as hotdogs, short ribs, sausage, spareribs, ibarra, rib eye roast or steak, and mutton. High-fat deli meats, such as salami and bologna. Caviar. Domestic duck and goose. Organ meats, such as kidney, liver, sweetbreads, and heart.  Dairy  Cream, sour cream, cream cheese, and creamed cottage cheese. Whole-milk cheeses, including blue (sandra), [place] Jonathan, Brie, Aaron, American, Havarti, Swiss, cheddar, Camembert, and [place]. Whole or 2% milk that is liquid, evaporated, or condensed. Whole buttermilk. Cream sauce or high-fat cheese sauce. Yogurt that is made from whole milk.  Beverages  Regular sodas and juice drinks with added sugar.  Sweets and Desserts  Frosting. Pudding. Cookies. Cakes other than leonid food cake. Candy that has milk chocolate or white  chocolate, hydrogenated fat, butter, coconut, or unknown ingredients. Buttered syrups. Full-fat ice cream or ice cream drinks.  Fats and Oils  Gravy that has suet, meat fat, or shortening. Cocoa butter, hydrogenated oils, palm oil, coconut oil, palm kernel oil. These can often be found in baked products, candy, fried foods, nondairy creamers, and whipped toppings. Solid fats and shortenings, including ibarra fat, salt pork, lard, and butter. Nondairy cream substitutes, such as coffee creamers and sour cream substitutes. Salad dressings that are made of unknown oils, cheese, or sour cream.  The items listed above may not be a complete list of foods and beverages to avoid. Contact your dietitian for more information.  This information is not intended to replace advice given to you by your health care provider. Make sure you discuss any questions you have with your health care provider.  Document Released: 06/18/2013 Document Revised: 05/25/2017 Document Reviewed: 06/11/2015  [employer] Interactive Patient Education © 2019 [employer]    High Triglycerides Eating Plan  Triglycerides are a type of fat in the blood. High levels of triglycerides can increase your risk of heart disease and stroke. If your triglyceride levels are high, choosing the right foods can help lower your triglycerides and keep your heart healthy. Work with your health care provider or a diet and nutrition specialist (dietitian) to develop an eating plan that is right for you.  What are tips for following this plan?  General guidelines  · Lose weight, if you are overweight. For most people, losing 5-10 lbs (2-5 kg) helps lower triglyceride levels. A weight-loss plan may include.  ? 30 minutes of exercise at least 5 days a week.  ? Reducing the amount of calories, sugar, and fat you eat.  · Eat a wide variety of fresh fruits, vegetables, and whole grains. These foods are high in fiber.  · Eat foods that contain healthy fats, such as fatty fish, nuts,  seeds, and olive oil.  · Avoid foods that are high in added sugar, added salt (sodium), saturated fat, and trans fat.  · Avoid low-fiber, refined carbohydrates such as white bread, crackers, noodles, and white rice.  · Avoid foods with partially hydrogenated oils (trans fats), such as fried foods or stick margarine.  · Limit alcohol intake to no more than 1 drink a day for nonpregnant women and 2 drinks a day for men. One drink equals 12 oz of beer, 5 oz of wine, or 1½ oz of hard liquor. Your health care provider may recommend that you drink less depending on your overall health.  Reading food labels  · Check food labels for the amount of saturated fat. Choose foods with no or very little saturated fat.  · Check food labels for the amount of trans fat. Choose foods with no trans fat.  · Check food labels for the amount of cholesterol. Choose foods low in cholesterol. Ask your dietitian how much cholesterol you should have each day.  · Check food labels for the amount of sodium. Choose foods with less than 140 milligrams (mg) per serving.  Shopping  · Buy dairy products labeled as nonfat (skim) or low-fat (1%).  · Avoid buying processed or prepackaged foods. These are often high in added sugar, sodium, and fat.  Cooking  · Choose healthy fats when cooking, such as olive oil or canola oil.  · Cook foods using lower fat methods, such as baking, broiling, boiling, or grilling.  · Make your own sauces, dressings, and marinades when possible, instead of buying them. Store-bought sauces, dressings, and marinades are often high in sodium and sugar.  Meal planning  · Eat more home-cooked food and less restaurant, buffet, and fast food.  · Eat fatty fish at least 2 times each week. Examples of fatty fish include salmon, trout, mackerel, tuna, and herring.  · If you eat whole eggs, do not eat more than 3 egg yolks per week.  What foods are recommended?  The items listed may not be a complete list. Talk with your dietitian  about what dietary choices are best for you.  Grains  Whole wheat or whole grain breads, crackers, cereals, and pasta. Unsweetened oatmeal. Bulgur. Barley. Quinoa. Brown rice. Whole wheat flour tortillas.  Vegetables  Fresh or frozen vegetables. Low-sodium canned vegetables.  Fruits  All fresh, canned (in natural juice), or frozen fruits.  Meats and other protein foods  Skinless chicken or turkey. Ground chicken or turkey. Lean cuts of pork, trimmed of fat. Fish and seafood, especially salmon, trout, and herring. Egg whites. Dried beans, peas, or lentils. Unsalted nuts or seeds. Unsalted canned beans. Natural peanut or almond butter.  Dairy  Low-fat dairy products. Skim or low-fat (1%) milk. Reduced fat (2%) and low-sodium cheese. Low-fat ricotta cheese. Low-fat cottage cheese. Plain, low-fat yogurt.  Fats and oils  Tub margarine without trans fats. Light or reduced-fat mayonnaise. Light or reduced-fat salad dressings. Avocado. Safflower, olive, sunflower, soybean, and canola oils.  What foods are not recommended?  The items listed may not be a complete list. Talk with your dietitian about what dietary choices are best for you.  Grains  White bread. White (regular) pasta. White rice. Cornbread. Bagels. Pastries. Crackers that contain trans fat.  Vegetables  Creamed or fried vegetables. Vegetables in a cheese sauce.  Fruits  Sweetened dried fruit. Canned fruit in syrup. Fruit juice.  Meats and other protein foods  Fatty cuts of meat. Ribs. Chicken wings. [place]. Sausage. Bologna. Salami. Chitterlings. Fatback. Hot dogs. Bratwurst. Packaged lunch meats.  Dairy  Whole or reduced-fat (2%) milk. Half-and-half. Cream cheese. Full-fat or sweetened yogurt. Full-fat cheese. Nondairy creamers. Whipped toppings. Processed cheese or cheese spreads. Cheese curds.  Beverages  Alcohol. Sweetened drinks, such as soda, lemonade, fruit drinks, or punches.  Fats and oils  Butter. Stick margarine. Lard. Shortening. Ghee. [place] fat.  Tropical oils, such as coconut, palm kernel, or palm oils.  Sweets and desserts  Corn syrup. Sugars. Honey. Molasses. Candy. Jam and jelly. Syrup. Sweetened cereals. Cookies. Pies. Cakes. Donuts. Muffins. Ice cream.  Condiments  Store-bought sauces, dressings, and marinades that are high in sugar, such as ketchup and barbecue sauce.  Summary  · High levels of triglycerides can increase the risk of heart disease and stroke. Choosing the right foods can help lower your triglycerides.  · Eat plenty of fresh fruits, vegetables, and whole grains. Choose low-fat dairy and lean meats. Eat fatty fish at least twice a week.  · Avoid processed and prepackaged foods with added sugar, sodium, saturated fat, and trans fat.  · If you need suggestions or have questions about what types of food are good for you, talk with your health care provider or a dietitian.  This information is not intended to replace advice given to you by your health care provider. Make sure you discuss any questions you have with your health care provider.  Document Released: 10/05/2005 Document Revised: 02/20/2018 Document Reviewed: 02/20/2018  [employer] Interactive Patient Education © 2019 [employer]  Hyperkalemia  Hyperkalemia is when you have too much potassium in your blood. Potassium is normally removed (excreted) from your body by your kidneys. If there is too much potassium in your blood, it can affect your heart’s ability to function.  What are the causes?  Hyperkalemia may be caused by:  · Taking in too much potassium. You can do this by:  ? Using salt substitutes. They contain large amounts of potassium.  ? Taking potassium supplements.  ? Eating foods high in potassium.  · Excreting too little potassium. This can happen if:  ? Your kidneys are not working properly. Kidney (renal) disease, including short- or long-term renal failure, is a very common cause of hyperkalemia.  ? You are taking medicines that lower your excretion of  potassium.  ? You have [place] disease.  ? You have a urinary tract blockage, such as kidney stones.  ? You are on treatment to mechanically clean your blood (dialysis) and you skip a treatment.  · Releasing a high amount of potassium from your cells into your blood. This can happen with:  ? Injury to muscles (rhabdomyolysis) or other tissues. Most potassium is stored in your muscles.  ? Severe burns or infections.  ? Acidic blood plasma (acidosis). Acidosis can result from many diseases, such as uncontrolled diabetes.    What increases the risk?  The most common risk factor of hyperkalemia is kidney disease. Other risk factors of hyperkalemia include:  · [place] disease. This is a condition where your glands do not produce enough hormones.  · Alcoholism or heavy drug use.  · Using certain blood pressure medicines, such as angiotensin-converting enzyme (ACE) inhibitors, angiotensin II receptor blockers (ARBs), or potassium-sparing diuretics such as spironolactone.  · Severe injury or burn.    What are the signs or symptoms?  Oftentimes, there are no signs or symptoms of hyperkalemia. However, when your potassium level becomes high enough, you may experience symptoms such as:  · Irregular or very slow heartbeat.  · Nausea.  · Fatigue.  · Tingling of the skin or numbness of the hands or feet.  · Muscle weakness.  · Fatigue.  · Not being able to move (paralysis).    You may not have any symptoms of hyperkalemia.  How is this diagnosed?  Hyperkalemia may be diagnosed by:  · Physical exam.  · Blood tests.  · ECG (electrocardiogram).  · Discussion of prescription and non-prescription drug use.    How is this treated?  Treatment for hyperkalemia is often directed at the underlying cause. In some instances, treatment may include:  · Insulin.  · Glucose (sugar) and water solution given through a vein (intravenous or IV ).  · Dialysis.  · Medicines to remove the potassium from your body.  · Medicines to move calcium from your  bloodstream into your tissues.    Follow these instructions at home:  · Take medicines only as directed by your health care provider.  · Do not take any supplements, natural products, herbs, or vitamins without reviewing them with your health care provider. Certain supplements and natural food products can have high amounts of potassium.  · Limit your alcohol intake as directed by your health care provider.  · Stop illegal drug use. If you need help quitting, ask your health care provider.  · Keep all follow-up visits as directed by your health care provider. This is important.  · If you have kidney disease, you may need to follow a low potassium diet. A dietitian can help educate you on low potassium foods.  Contact a health care provider if:  · You notice an irregular or very slow heartbeat.  · You feel light-headed.  · You feel weak.  · You are nauseous.  · You have tingling or numbness in your hands or feet.  Get help right away if:  · You have shortness of breath.  · You have chest pain or discomfort.  · You pass out.  · You have muscle paralysis.  This information is not intended to replace advice given to you by your health care provider. Make sure you discuss any questions you have with your health care provider.  Document Released: 12/08/2003 Document Revised: 05/25/2017 Document Reviewed: 03/25/2015  [employer] Interactive Patient Education © 2018 [employer] Inc.

## 2019-05-21 ENCOUNTER — OFFICE VISIT (OUTPATIENT)
Dept: FAMILY MEDICINE CLINIC | Facility: CLINIC | Age: 69
End: 2019-05-21

## 2019-05-21 VITALS
HEIGHT: 68 IN | OXYGEN SATURATION: 98 % | HEART RATE: 87 BPM | DIASTOLIC BLOOD PRESSURE: 86 MMHG | SYSTOLIC BLOOD PRESSURE: 124 MMHG | TEMPERATURE: 97.7 F | WEIGHT: 153.4 LBS | BODY MASS INDEX: 23.25 KG/M2

## 2019-05-21 DIAGNOSIS — R06.2 WHEEZING: ICD-10-CM

## 2019-05-21 DIAGNOSIS — H66.90 ACUTE OTITIS MEDIA, UNSPECIFIED OTITIS MEDIA TYPE: ICD-10-CM

## 2019-05-21 DIAGNOSIS — J02.9 SORE THROAT: Primary | ICD-10-CM

## 2019-05-21 DIAGNOSIS — R09.81 HEAD CONGESTION: ICD-10-CM

## 2019-05-21 LAB
EXPIRATION DATE: NORMAL
INTERNAL CONTROL: NORMAL
Lab: NORMAL
S PYO AG THROAT QL: NEGATIVE

## 2019-05-21 PROCEDURE — 99214 OFFICE O/P EST MOD 30 MIN: CPT | Performed by: NURSE PRACTITIONER

## 2019-05-21 PROCEDURE — 87880 STREP A ASSAY W/OPTIC: CPT | Performed by: NURSE PRACTITIONER

## 2019-05-21 RX ORDER — PREDNISONE 20 MG/1
20 TABLET ORAL DAILY
Qty: 5 TABLET | Refills: 0 | Status: SHIPPED | OUTPATIENT
Start: 2019-05-21 | End: 2019-06-06

## 2019-05-21 RX ORDER — AMOXICILLIN AND CLAVULANATE POTASSIUM 875; 125 MG/1; MG/1
1 TABLET, FILM COATED ORAL EVERY 12 HOURS SCHEDULED
Qty: 14 TABLET | Refills: 0 | Status: SHIPPED | OUTPATIENT
Start: 2019-05-21 | End: 2019-05-28

## 2019-05-21 NOTE — PATIENT INSTRUCTIONS
Allergies, Adult  An allergy means that your body reacts to something that bothers it (allergen). It is not a normal reaction. This can happen from something that you:  · Eat.  · Breathe in.  · Touch.    You can have an allergy (be allergic) to:  · Outdoor things, like:  ? Pollen.  ? Grass.  ? Weeds.  · Indoor things, like:  ? Dust.  ? Smoke.  ? Pet dander.  · Foods.  · Medicines.  · Things that bother your skin, like:  ? Detergents.  ? Chemicals.  ? Latex.  · Perfume.  · Bugs.    An allergy cannot spread from person to person (is not contagious).  Follow these instructions at home:  · Stay away from things that you know you are allergic to.  · If you have allergies to things in the air, wash out your nose each day. Do it with one of these:  ? A salt-water (saline) spray.  ? A container (neti pot).  · Take over-the-counter and prescription medicines only as told by your doctor.  · Keep all follow-up visits as told by your doctor. This is important.  · If you are at risk for a very bad allergy reaction (anaphylaxis), keep an auto-injector with you all the time. This is called an epinephrine injection.  ? This is pre-measured medicine with a needle. You can put it into your skin by yourself.  ? Right after you have a very bad allergy reaction, you or a person with you must give the medicine in less than a few minutes. This is an emergency.  · If you have ever had a very bad allergy reaction, wear a medical alert bracelet or necklace. Your very bad allergy should be written on it.  Contact a health care provider if:  · Your symptoms do not get better with treatment.  Get help right away if:  · You have symptoms of a very bad allergy reaction. These include:  ? A swollen mouth, tongue, or throat.  ? Pain or tightness in your chest.  ? Trouble breathing.  ? Being short of breath.  ? Dizziness.  ? Fainting.  ? Very bad pain in your belly (abdomen).  ? Throwing up (vomiting).  ? Watery poop (diarrhea).  Summary  · An  allergy means that your body reacts to something that bothers it (allergen). It is not a normal reaction.  · Stay away from things that make your body react.  · Take over-the-counter and prescription medicines only as told by your doctor.  · If you are at risk for a very bad allergy reaction, carry an auto-injector (epinephrine injection) all the time. Also, wear a medical alert bracelet or necklace so people know about your allergy.  This information is not intended to replace advice given to you by your health care provider. Make sure you discuss any questions you have with your health care provider.  Document Released: 04/14/2014 Document Revised: 04/02/2018 Document Reviewed: 04/02/2018  [email or web address] Interactive Patient Education © 2019 [email or web address]    Cough, Adult  A cough helps to clear your throat and lungs. A cough may last only 2-3 weeks (acute), or it may last longer than 8 weeks (chronic). Many different things can cause a cough. A cough may be a sign of an illness or another medical condition.  Follow these instructions at home:  · Pay attention to any changes in your cough.  · Take medicines only as told by your doctor.  ? If you were prescribed an antibiotic medicine, take it as told by your doctor. Do not stop taking it even if you start to feel better.  ? Talk with your doctor before you try using a cough medicine.  · Drink enough fluid to keep your pee (urine) clear or pale yellow.  · If the air is dry, use a cold steam vaporizer or humidifier in your home.  · Stay away from things that make you cough at work or at home.  · If your cough is worse at night, try using extra pillows to raise your head up higher while you sleep.  · Do not smoke, and try not to be around smoke. If you need help quitting, ask your doctor.  · Do not have caffeine.  · Do not drink alcohol.  · Rest as needed.  Contact a doctor if:  · You have new problems (symptoms).  · You cough up yellow fluid (pus).  · Your cough does not  get better after 2-3 weeks, or your cough gets worse.  · Medicine does not help your cough and you are not sleeping well.  · You have pain that gets worse or pain that is not helped with medicine.  · You have a fever.  · You are losing weight and you do not know why.  · You have night sweats.  Get help right away if:  · You cough up blood.  · You have trouble breathing.  · Your heartbeat is very fast.  This information is not intended to replace advice given to you by your health care provider. Make sure you discuss any questions you have with your health care provider.  Document Released: 08/30/2012 Document Revised: 05/25/2017 Document Reviewed: 02/24/2016  [employer] Interactive Patient Education © 2019 [employer].    Otitis Media, Adult  Otitis media means that the middle ear is red and swollen (inflamed) and full of fluid. The condition usually goes away on its own.  Follow these instructions at home:  · Take over-the-counter and prescription medicines only as told by your doctor.  · If you were prescribed an antibiotic medicine, take it as told by your doctor. Do not stop taking the antibiotic even if you start to feel better.  · Keep all follow-up visits as told by your doctor. This is important.  Contact a doctor if:  · You have bleeding from your nose.  · There is a lump on your neck.  · You are not getting better in 5 days.  · You feel worse instead of better.  Get help right away if:  · You have pain that is not helped with medicine.  · You have swelling, redness, or pain around your ear.  · You get a stiff neck.  · You cannot move part of your face (paralyzed).  · You notice that the bone behind your ear hurts when you touch it.  · You get a very bad headache.  Summary  · Otitis media means that the middle ear is red, swollen, and full of fluid.  · This condition usually goes away on its own. In some cases, treatment may be needed.  · If you were prescribed an antibiotic medicine, take it as told by your  doctor.  This information is not intended to replace advice given to you by your health care provider. Make sure you discuss any questions you have with your health care provider.  Document Released: 06/05/2009 Document Revised: 01/08/2018 Document Reviewed: 01/08/2018  [employer] Interactive Patient Education © 2019 [employer]

## 2019-05-21 NOTE — PROGRESS NOTES
Subjective   Dixie Cervantes is a 68 y.o. female.   Chief Complaint   Patient presents with   • Sore Throat     X weeks   • Right ear pain       History of Present Illness   Patient is here with complaint of sore throat, and right ear pain x weeks, improves, then gets worse again. Takes zyrtec everyday. She did see an ENT in Feb. For the right ear discomfort, was prescribed ear drops that helped for awhile.  States she has no energy, head feels full, but not sinuses.  The following portions of the patient's history were reviewed and updated as appropriate: allergies and current medications.    Review of Systems   Constitutional: Positive for appetite change (decreased) and fatigue. Negative for chills and fever.   HENT: Positive for congestion, ear pain (right), postnasal drip, rhinorrhea, sneezing, sore throat and tinnitus. Negative for sinus pressure and sinus pain.    Eyes: Negative for discharge and itching.   Respiratory: Positive for cough. Negative for shortness of breath and wheezing.    Gastrointestinal: Negative for diarrhea and nausea.   Allergic/Immunologic: Positive for environmental allergies.   Neurological: Negative for dizziness and headaches.       Objective   Physical Exam   Constitutional: She is oriented to person, place, and time. She appears well-developed and well-nourished. No distress.   HENT:   Head: Normocephalic and atraumatic.   Right Ear: External ear normal. Tympanic membrane is erythematous and bulging. A middle ear effusion is present.   Left Ear: External ear normal. Tympanic membrane is not erythematous and not bulging. A middle ear effusion is present.   Mouth/Throat: No oropharyngeal exudate.   Posterior pharynx cobblestoning   Eyes: Conjunctivae are normal.   Cardiovascular: Normal rate, regular rhythm and normal heart sounds.   No murmur heard.  Pulmonary/Chest: Effort normal. She has wheezes (faint right lung).   Abdominal: Soft.   Lymphadenopathy:     She has no cervical  adenopathy.   Neurological: She is alert and oriented to person, place, and time.   Skin: Skin is warm and dry.   Psychiatric: She has a normal mood and affect. Her behavior is normal.   Vitals reviewed.      Assessment/Plan   Dixie was seen today for sore throat and right ear pain.    Diagnoses and all orders for this visit:    Sore throat  -     POC Rapid Strep A  -     amoxicillin-clavulanate (AUGMENTIN) 875-125 MG per tablet; Take 1 tablet by mouth Every 12 (Twelve) Hours for 7 days.    Acute otitis media, unspecified otitis media type  -     amoxicillin-clavulanate (AUGMENTIN) 875-125 MG per tablet; Take 1 tablet by mouth Every 12 (Twelve) Hours for 7 days.    Head congestion  -     amoxicillin-clavulanate (AUGMENTIN) 875-125 MG per tablet; Take 1 tablet by mouth Every 12 (Twelve) Hours for 7 days.  -     predniSONE (DELTASONE) 20 MG tablet; Take 1 tablet by mouth Daily.    Wheezing      Results for orders placed or performed in visit on 05/21/19   POC Rapid Strep A   Result Value Ref Range    Rapid Strep A Screen Negative Negative, VALID, INVALID, Not Performed    Internal Control Passed Passed    Lot Number [id number]     Expiration Date 01/03/2022    '  Rapid strep negative in office today, cobblestoning indicates allergy symptoms. Try salt water gargles, continue antihistamine and Flonase.  Augmentin prescribed for otitis  Prednisone for congestion and wheezing  Patient was encouraged to keep me informed of any acute changes, lack of improvement, or any new concerning symptoms.  Patient voiced understanding of all instructions and denied further questions.

## 2019-06-06 ENCOUNTER — APPOINTMENT (OUTPATIENT)
Dept: LAB | Facility: HOSPITAL | Age: 69
End: 2019-06-06

## 2019-06-06 ENCOUNTER — OFFICE VISIT (OUTPATIENT)
Dept: FAMILY MEDICINE CLINIC | Facility: CLINIC | Age: 69
End: 2019-06-06

## 2019-06-06 VITALS
HEIGHT: 68 IN | BODY MASS INDEX: 23.67 KG/M2 | SYSTOLIC BLOOD PRESSURE: 118 MMHG | WEIGHT: 156.2 LBS | DIASTOLIC BLOOD PRESSURE: 88 MMHG | OXYGEN SATURATION: 98 % | HEART RATE: 94 BPM

## 2019-06-06 DIAGNOSIS — F41.1 GAD (GENERALIZED ANXIETY DISORDER): ICD-10-CM

## 2019-06-06 DIAGNOSIS — R07.89 CHEST TIGHTNESS: Primary | ICD-10-CM

## 2019-06-06 DIAGNOSIS — K21.9 GASTROESOPHAGEAL REFLUX DISEASE, ESOPHAGITIS PRESENCE NOT SPECIFIED: ICD-10-CM

## 2019-06-06 LAB
ALBUMIN SERPL-MCNC: 4.6 G/DL (ref 3.5–5.2)
ALBUMIN/GLOB SERPL: 1.8 G/DL
ALP SERPL-CCNC: 70 U/L (ref 39–117)
ALT SERPL W P-5'-P-CCNC: 16 U/L (ref 1–33)
ANION GAP SERPL CALCULATED.3IONS-SCNC: 10.5 MMOL/L
AST SERPL-CCNC: 15 U/L (ref 1–32)
BILIRUB SERPL-MCNC: 0.6 MG/DL (ref 0.2–1.2)
BUN BLD-MCNC: 12 MG/DL (ref 8–23)
BUN/CREAT SERPL: 13.6 (ref 7–25)
CALCIUM SPEC-SCNC: 9.8 MG/DL (ref 8.6–10.5)
CHLORIDE SERPL-SCNC: 104 MMOL/L (ref 98–107)
CO2 SERPL-SCNC: 28.5 MMOL/L (ref 22–29)
CREAT BLD-MCNC: 0.88 MG/DL (ref 0.57–1)
GFR SERPL CREATININE-BSD FRML MDRD: 64 ML/MIN/1.73
GLOBULIN UR ELPH-MCNC: 2.5 GM/DL
GLUCOSE BLD-MCNC: 116 MG/DL (ref 65–99)
MAGNESIUM SERPL-MCNC: 2.2 MG/DL (ref 1.6–2.4)
POTASSIUM BLD-SCNC: 4.3 MMOL/L (ref 3.5–5.2)
PROT SERPL-MCNC: 7.1 G/DL (ref 6–8.5)
SODIUM BLD-SCNC: 143 MMOL/L (ref 136–145)

## 2019-06-06 PROCEDURE — 80053 COMPREHEN METABOLIC PANEL: CPT | Performed by: NURSE PRACTITIONER

## 2019-06-06 PROCEDURE — 99214 OFFICE O/P EST MOD 30 MIN: CPT | Performed by: NURSE PRACTITIONER

## 2019-06-06 PROCEDURE — 93000 ELECTROCARDIOGRAM COMPLETE: CPT | Performed by: NURSE PRACTITIONER

## 2019-06-06 PROCEDURE — 83735 ASSAY OF MAGNESIUM: CPT | Performed by: NURSE PRACTITIONER

## 2019-06-06 NOTE — PATIENT INSTRUCTIONS
Nonspecific Chest Pain  Chest pain can be caused by many different conditions. There is a chance that your pain could be related to something serious, such as a heart attack or a blood clot in your lungs. Chest pain can also be caused by conditions that are not life-threatening. If you have chest pain, it is very important to follow up with your doctor.  Follow these instructions at home:  Medicines  · If you were prescribed an antibiotic medicine, take it as told by your doctor. Do not stop taking the antibiotic even if you start to feel better.  · Take over-the-counter and prescription medicines only as told by your doctor.  Lifestyle  · Do not use any products that contain nicotine or tobacco, such as cigarettes and e-cigarettes. If you need help quitting, ask your doctor.  · Do not drink alcohol.  · Make lifestyle changes as told by your doctor. These may include:  ? Getting regular exercise. Ask your doctor for some activities that are safe for you.  ? Eating a heart-healthy diet. A diet specialist (dietitian) can help you to learn healthy eating options.  ? Staying at a healthy weight.  ? Managing diabetes, if needed.  ? Lowering your stress, as with deep breathing or spending time in nature.  General instructions  · Avoid any activities that make you feel chest pain.  · If your chest pain is because of heartburn:  ? Raise (elevate) the head of your bed about 6 inches (15 cm). You can do this by putting blocks under the bed legs at the head of the bed.  ? Do not sleep with extra pillows under your head. That does not help heartburn.  · Keep all follow-up visits as told by your doctor. This is important. This includes any further testing if your chest pain does not go away.  Contact a doctor if:  · Your chest pain does not go away.  · You have a rash with blisters on your chest.  · You have a fever.  · You have chills.  Get help right away if:  · Your chest pain is worse.  · You have a cough that gets worse, or  you cough up blood.  · You have very bad (severe) pain in your belly (abdomen).  · You are very weak.  · You pass out (faint).  · You have either of these for no clear reason:  ? Sudden chest discomfort.  ? Sudden discomfort in your arms, back, neck, or jaw.  · You have shortness of breath at any time.  · You suddenly start to sweat, or your skin gets clammy.  · You feel sick to your stomach (nauseous).  · You throw up (vomit).  · You suddenly feel light-headed or dizzy.  · Your heart starts to beat fast, or it feels like it is skipping beats.  These symptoms may be an emergency. Do not wait to see if the symptoms will go away. Get medical help right away. Call your local emergency services (911 in the [place] Do not drive yourself to the hospital.  This information is not intended to replace advice given to you by your health care provider. Make sure you discuss any questions you have with your health care provider.  Document Released: 06/05/2009 Document Revised: 09/11/2017 Document Reviewed: 09/11/2017  [employer] Interactive Patient Education © 2019 [employer]    Food Choices for Gastroesophageal Reflux Disease, Adult  When you have gastroesophageal reflux disease (GERD), the foods you eat and your eating habits are very important. Choosing the right foods can help ease your discomfort. Think about working with a nutrition specialist (dietitian) to help you make good choices.  What are tips for following this plan?  Meals  · Choose healthy foods that are low in fat, such as fruits, vegetables, whole grains, low-fat dairy products, and lean meat, fish, and poultry.  · Eat small meals often instead of 3 large meals a day. Eat your meals slowly, and in a place where you are relaxed. Avoid bending over or lying down until 2-3 hours after eating.  · Avoid eating meals 2-3 hours before bed.  · Avoid drinking a lot of liquid with meals.  · Cook foods using methods other than frying. Bake, grill, or broil food  instead.  · Avoid or limit:  ? Chocolate.  ? Peppermint or spearmint.  ? Alcohol.  ? Pepper.  ? Black and decaffeinated coffee.  ? Black and decaffeinated tea.  ? Bubbly (carbonated) soft drinks.  ? Caffeinated energy drinks and soft drinks.  · Limit high-fat foods such as:  ? Fatty meat or fried foods.  ? Whole milk, cream, butter, or ice cream.  ? Nuts and nut butters.  ? Pastries, donuts, and sweets made with butter or shortening.  · Avoid foods that cause symptoms. These foods may be different for everyone. Common foods that cause symptoms include:  ? Tomatoes.  ? Oranges, domitila, and limes.  ? Peppers.  ? Spicy food.  ? Onions and garlic.  ? Vinegar.  Lifestyle    · Maintain a healthy weight. Ask your doctor what weight is healthy for you. If you need to lose weight, work with your doctor to do so safely.  · Exercise for at least 30 minutes for 5 or more days each week, or as told by your doctor.  · Wear loose-fitting clothes.  · Do not smoke. If you need help quitting, ask your doctor.  · Sleep with the head of your bed higher than your feet. Use a wedge under the mattress or blocks under the bed frame to raise the head of the bed.  Summary  · When you have gastroesophageal reflux disease (GERD), food and lifestyle choices are very important in easing your symptoms.  · Eat small meals often instead of 3 large meals a day. Eat your meals slowly, and in a place where you are relaxed.  · Limit high-fat foods such as fatty meat or fried foods.  · Avoid bending over or lying down until 2-3 hours after eating.  · Avoid peppermint and spearmint, caffeine, alcohol, and chocolate.  This information is not intended to replace advice given to you by your health care provider. Make sure you discuss any questions you have with your health care provider.  Document Released: 06/18/2013 Document Revised: 01/23/2018 Document Reviewed: 01/23/2018  [employer] Interactive Patient Education © 2019 [employer]    Heartburn  Heartburn is a type of pain or discomfort that can happen in the throat or chest. It is often described as a burning pain. It may also cause a bad taste in the mouth. Heartburn may feel worse when you lie down or bend over. It may be caused by stomach contents that move back up (reflux) into the tube that connects the mouth with the stomach (esophagus).  Follow these instructions at home:  Take these actions to lessen your discomfort and to help avoid problems.  Diet  · Follow a diet as told by your doctor. You may need to avoid foods and drinks such as:  ? Coffee and tea (with or without caffeine).  ? Drinks that contain alcohol.  ? Energy drinks and sports drinks.  ? Carbonated drinks or sodas.  ? Chocolate and cocoa.  ? Peppermint and mint flavorings.  ? Garlic and onions.  ? Horseradish.  ? Spicy and acidic foods, such as peppers, chili powder, baker powder, vinegar, hot sauces, and BBQ sauce.  ? Citrus fruit juices and citrus fruits, such as oranges, domitila, and limes.  ? Tomato-based foods, such as red sauce, chili, salsa, and pizza with red sauce.  ? Fried and fatty foods, such as donuts, french fries, potato chips, and high-fat dressings.  ? High-fat meats, such as hot dogs, rib eye steak, sausage, ham, and ibarra.  ? High-fat dairy items, such as whole milk, butter, and cream cheese.  · Eat small meals often. Avoid eating large meals.  · Avoid drinking large amounts of liquid with your meals.  · Avoid eating meals during the 2-3 hours before bedtime.  · Avoid lying down right after you eat.  · Do not exercise right after you eat.  General instructions  · Pay attention to any changes in your symptoms.  · Take over-the-counter and prescription medicines only as told by your doctor. Do not take aspirin, ibuprofen, or other NSAIDs unless your doctor says it is okay.  · Do not use any tobacco products, including cigarettes, chewing tobacco, and e-cigarettes. If you need help quitting, ask your  doctor.  · Wear loose clothes. Do not wear anything tight around your waist.  · Raise (elevate) the head of your bed about 6 inches (15 cm).  · Try to lower your stress. If you need help doing this, ask your doctor.  · If you are overweight, lose an amount of weight that is healthy for you. Ask your doctor about a safe weight loss goal.  · Keep all follow-up visits as told by your doctor. This is important.  Contact a doctor if:  · You have new symptoms.  · You lose weight and you do not know why it is happening.  · You have trouble swallowing, or it hurts to swallow.  · You have wheezing or a cough that keeps happening.  · Your symptoms do not get better with treatment.  · You have heartburn often for more than two weeks.  Get help right away if:  · You have pain in your arms, neck, jaw, teeth, or back.  · You feel sweaty, dizzy, or light-headed.  · You have chest pain or shortness of breath.  · You throw up (vomit) and your throw up looks like blood or coffee grounds.  · Your poop (stool) is bloody or black.  This information is not intended to replace advice given to you by your health care provider. Make sure you discuss any questions you have with your health care provider.  Document Released: 08/29/2012 Document Revised: 05/25/2017 Document Reviewed: 04/13/2016  [employer] Interactive Patient Education © 2019 [employer]    Gastroesophageal Reflux Disease, Adult  Normally, food travels down the esophagus and stays in the stomach to be digested. If a person has gastroesophageal reflux disease (GERD), food and stomach acid move back up into the esophagus. When this happens, the esophagus becomes sore and swollen (inflamed). Over time, GERD can make small holes (ulcers) in the lining of the esophagus.  Follow these instructions at home:  Diet  · Follow a diet as told by your doctor. You may need to avoid foods and drinks such as:  ? Coffee and tea (with or without caffeine).  ? Drinks that contain  alcohol.  ? Energy drinks and sports drinks.  ? Carbonated drinks or sodas.  ? Chocolate and cocoa.  ? Peppermint and mint flavorings.  ? Garlic and onions.  ? Horseradish.  ? Spicy and acidic foods, such as peppers, chili powder, baker powder, vinegar, hot sauces, and BBQ sauce.  ? Citrus fruit juices and citrus fruits, such as oranges, domitila, and limes.  ? Tomato-based foods, such as red sauce, chili, salsa, and pizza with red sauce.  ? Fried and fatty foods, such as donuts, french fries, potato chips, and high-fat dressings.  ? High-fat meats, such as hot dogs, rib eye steak, sausage, ham, and ibarra.  ? High-fat dairy items, such as whole milk, butter, and cream cheese.  · Eat small meals often. Avoid eating large meals.  · Avoid drinking large amounts of liquid with your meals.  · Avoid eating meals during the 2-3 hours before bedtime.  · Avoid lying down right after you eat.  · Do not exercise right after you eat.  General instructions  · Pay attention to any changes in your symptoms.  · Take over-the-counter and prescription medicines only as told by your doctor. Do not take aspirin, ibuprofen, or other NSAIDs unless your doctor says it is okay.  · Do not use any tobacco products, including cigarettes, chewing tobacco, and e-cigarettes. If you need help quitting, ask your doctor.  · Wear loose clothes. Do not wear anything tight around your waist.  · Raise (elevate) the head of your bed about 6 inches (15 cm).  · Try to lower your stress. If you need help doing this, ask your doctor.  · If you are overweight, lose an amount of weight that is healthy for you. Ask your doctor about a safe weight loss goal.  · Keep all follow-up visits as told by your doctor. This is important.  Contact a doctor if:  · You have new symptoms.  · You lose weight and you do not know why it is happening.  · You have trouble swallowing, or it hurts to swallow.  · You have wheezing or a cough that keeps happening.  · Your symptoms do  not get better with treatment.  · You have a hoarse voice.  Get help right away if:  · You have pain in your arms, neck, jaw, teeth, or back.  · You feel sweaty, dizzy, or light-headed.  · You have chest pain or shortness of breath.  · You throw up (vomit) and your throw up looks like blood or coffee grounds.  · You pass out (faint).  · Your poop (stool) is bloody or black.  · You cannot swallow, drink, or eat.  This information is not intended to replace advice given to you by your health care provider. Make sure you discuss any questions you have with your health care provider.  Document Released: 06/05/2009 Document Revised: 05/25/2017 Document Reviewed: 04/13/2016  [employer] Interactive Patient Education © 2018 [employer]
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
## 2019-07-16 ENCOUNTER — OFFICE VISIT (OUTPATIENT)
Dept: FAMILY MEDICINE CLINIC | Facility: CLINIC | Age: 69
End: 2019-07-16

## 2019-07-16 VITALS
SYSTOLIC BLOOD PRESSURE: 118 MMHG | HEART RATE: 91 BPM | WEIGHT: 157.4 LBS | OXYGEN SATURATION: 98 % | BODY MASS INDEX: 23.86 KG/M2 | HEIGHT: 68 IN | DIASTOLIC BLOOD PRESSURE: 80 MMHG

## 2019-07-16 DIAGNOSIS — M19.90 ARTHRITIS: ICD-10-CM

## 2019-07-16 DIAGNOSIS — F41.9 ANXIETY: ICD-10-CM

## 2019-07-16 DIAGNOSIS — G47.00 INSOMNIA, UNSPECIFIED TYPE: Primary | ICD-10-CM

## 2019-07-16 PROCEDURE — 99214 OFFICE O/P EST MOD 30 MIN: CPT | Performed by: NURSE PRACTITIONER

## 2019-07-16 RX ORDER — ALPRAZOLAM 0.25 MG/1
0.12 TABLET ORAL DAILY PRN
Qty: 15 TABLET | Refills: 0 | Status: SHIPPED | OUTPATIENT
Start: 2019-07-16 | End: 2020-06-04 | Stop reason: SDUPTHER

## 2019-07-16 RX ORDER — ZOLPIDEM TARTRATE 10 MG/1
10 TABLET ORAL NIGHTLY PRN
Qty: 30 TABLET | Refills: 2 | Status: SHIPPED | OUTPATIENT
Start: 2019-07-16 | End: 2019-10-18 | Stop reason: SDUPTHER
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
## 2019-07-16 NOTE — PATIENT INSTRUCTIONS
Arthritis  Arthritis means joint pain. It can also mean joint disease. A joint is a place where bones come together. People who have arthritis may have:  · Red joints.  · Swollen joints.  · Stiff joints.  · Warm joints.  · A fever.  · A feeling of being sick.    Follow these instructions at home:  Pay attention to any changes in your symptoms. Take these actions to help with your pain and swelling.  Medicines  · Take over-the-counter and prescription medicines only as told by your doctor.  · Do not take aspirin for pain if your doctor says that you may have gout.  Activity  · Rest your joint if your doctor tells you to.  · Avoid activities that make the pain worse.  · Exercise your joint regularly as told by your doctor. Try doing exercises like:  ? Swimming.  ? Water aerobics.  ? Biking.  ? Walking.  Joint Care    · If your joint is swollen, keep it raised (elevated) if told by your doctor.  · If your joint feels stiff in the morning, try taking a warm shower.  · If you have diabetes, do not apply heat without asking your doctor.  · If told, apply heat to the joint:  ? Put a towel between the joint and the hot pack or heating pad.  ? Leave the heat on the area for 20-30 minutes.  · If told, apply ice to the joint:  ? Put ice in a plastic bag.  ? Place a towel between your skin and the bag.  ? Leave the ice on for 20 minutes, 2-3 times per day.  · Keep all follow-up visits as told by your doctor.  Contact a doctor if:  · The pain gets worse.  · You have a fever.  Get help right away if:  · You have very bad pain in your joint.  · You have swelling in your joint.  · Your joint is red.  · Many joints become painful and swollen.  · You have very bad back pain.  · Your leg is very weak.  · You cannot control your pee (urine) or poop (stool).  This information is not intended to replace advice given to you by your health care provider. Make sure you discuss any questions you have with your health care provider.  Document  Released: 03/14/2011 Document Revised: 05/25/2017 Document Reviewed: 03/14/2016  [employer] Interactive Patient Education © 2019 [employer]    Hand Pain  Many things can cause hand pain. Some common causes are:  · An injury.  · Repeating the same movement with your hand over and over (overuse).  · Osteoporosis.  · Arthritis.  · Lumps in the tendons or joints of the hand and wrist (ganglion cysts).  · Infection.    Follow these instructions at home:  Pay attention to any changes in your symptoms. Take these actions to help with your discomfort:  · If directed, put ice on the affected area:  ? Put ice in a plastic bag.  ? Place a towel between your skin and the bag.  ? Leave the ice on for 15-20 minutes, 3?4 times a day for 2 days.  · Take over-the-counter and prescription medicines only as told by your health care provider.  · Minimize stress on your hands and wrists as much as possible.  · Take breaks from repetitive activity often.  · Do stretches as told by your health care provider.  · Do not do activities that make your pain worse.    Contact a health care provider if:  · Your pain does not get better after a few days of self-care.  · Your pain gets worse.  · Your pain affects your ability to do your daily activities.  Get help right away if:  · Your hand becomes warm, red, or swollen.  · Your hand is numb or tingling.  · Your hand is extremely swollen or deformed.  · Your hand or fingers turn white or blue.  · You cannot move your hand, wrist, or fingers.  This information is not intended to replace advice given to you by your health care provider. Make sure you discuss any questions you have with your health care provider.  Document Released: 01/13/2017 Document Revised: 05/25/2017 Document Reviewed: 01/13/2016  [employer] Interactive Patient Education © 2019 [employer]  Insomnia  Insomnia is a sleep disorder that makes it difficult to fall asleep or to stay asleep. Insomnia can cause tiredness (fatigue),  low energy, difficulty concentrating, mood swings, and poor performance at work or school.  There are three different ways to classify insomnia:  · Difficulty falling asleep.  · Difficulty staying asleep.  · Waking up too early in the morning.    Any type of insomnia can be long-term (chronic) or short-term (acute). Both are common. Short-term insomnia usually lasts for three months or less. Chronic insomnia occurs at least three times a week for longer than three months.  What are the causes?  Insomnia may be caused by another condition, situation, or substance, such as:  · Anxiety.  · Certain medicines.  · Gastroesophageal reflux disease (GERD) or other gastrointestinal conditions.  · Asthma or other breathing conditions.  · Restless legs syndrome, sleep apnea, or other sleep disorders.  · Chronic pain.  · Menopause. This may include hot flashes.  · Stroke.  · Abuse of alcohol, tobacco, or illegal drugs.  · Depression.  · Caffeine.  · Neurological disorders, such as Alzheimer disease.  · An overactive thyroid (hyperthyroidism).    The cause of insomnia may not be known.  What increases the risk?  Risk factors for insomnia include:  · Gender. Women are more commonly affected than men.  · Age. Insomnia is more common as you get older.  · Stress. This may involve your professional or personal life.  · Income. Insomnia is more common in people with lower income.  · Lack of exercise.  · Irregular work schedule or night shifts.  · Traveling between different time zones.    What are the signs or symptoms?  If you have insomnia, trouble falling asleep or trouble staying asleep is the main symptom. This may lead to other symptoms, such as:  · Feeling fatigued.  · Feeling nervous about going to sleep.  · Not feeling rested in the morning.  · Having trouble concentrating.  · Feeling irritable, anxious, or depressed.    How is this treated?  Treatment for insomnia depends on the cause. If your insomnia is caused by an  underlying condition, treatment will focus on addressing the condition. Treatment may also include:  · Medicines to help you sleep.  · Counseling or therapy.  · Lifestyle adjustments.    Follow these instructions at home:  · Take medicines only as directed by your health care provider.  · Keep regular sleeping and waking hours. Avoid naps.  · Keep a sleep diary to help you and your health care provider figure out what could be causing your insomnia. Include:  ? When you sleep.  ? When you wake up during the night.  ? How well you sleep.  ? How rested you feel the next day.  ? Any side effects of medicines you are taking.  ? What you eat and drink.  · Make your bedroom a comfortable place where it is easy to fall asleep:  ? Put up shades or special blackout curtains to block light from outside.  ? Use a white noise machine to block noise.  ? Keep the temperature cool.  · Exercise regularly as directed by your health care provider. Avoid exercising right before bedtime.  · Use relaxation techniques to manage stress. Ask your health care provider to suggest some techniques that may work well for you. These may include:  ? Breathing exercises.  ? Routines to release muscle tension.  ? Visualizing peaceful scenes.  · Cut back on alcohol, caffeinated beverages, and cigarettes, especially close to bedtime. These can disrupt your sleep.  · Do not overeat or eat spicy foods right before bedtime. This can lead to digestive discomfort that can make it hard for you to sleep.  · Limit screen use before bedtime. This includes:  ? Watching TV.  ? Using your smartphone, tablet, and computer.  · Stick to a routine. This can help you fall asleep faster. Try to do a quiet activity, brush your teeth, and go to bed at the same time each night.  · Get out of bed if you are still awake after 15 minutes of trying to sleep. Keep the lights down, but try reading or doing a quiet activity. When you feel sleepy, go back to bed.  · Make sure  that you drive carefully. Avoid driving if you feel very sleepy.  · Keep all follow-up appointments as directed by your health care provider. This is important.  Contact a health care provider if:  · You are tired throughout the day or have trouble in your daily routine due to sleepiness.  · You continue to have sleep problems or your sleep problems get worse.  Get help right away if:  · You have serious thoughts about hurting yourself or someone else.  This information is not intended to replace advice given to you by your health care provider. Make sure you discuss any questions you have with your health care provider.  Document Released: 12/15/2001 Document Revised: 05/19/2017 Document Reviewed: 09/18/2015  [employer] Interactive Patient Education © 2018 [employer]    Generalized Anxiety Disorder, Adult  Generalized anxiety disorder (OCTAVIO) is a mental health disorder. People with this condition constantly worry about everyday events. Unlike normal anxiety, worry related to OCTAVIO is not triggered by a specific event. These worries also do not fade or get better with time. OCTAVIO interferes with life functions, including relationships, work, and school.  OCTAVIO can vary from mild to severe. People with severe OCTAVIO can have intense waves of anxiety with physical symptoms (panic attacks).  What are the causes?  The exact cause of OCTAVIO is not known.  What increases the risk?  This condition is more likely to develop in:  · Women.  · People who have a family history of anxiety disorders.  · People who are very shy.  · People who experience very stressful life events, such as the death of a loved one.  · People who have a very stressful family environment.    What are the signs or symptoms?  People with OCTAVIO often worry excessively about many things in their lives, such as their health and family. They may also be overly concerned about:  · Doing well at work.  · Being on time.  · Natural disasters.  · Friendships.    Physical  symptoms of OCTAVIO include:  · Fatigue.  · Muscle tension or having muscle twitches.  · Trembling or feeling shaky.  · Being easily startled.  · Feeling like your heart is pounding or racing.  · Feeling out of breath or like you cannot take a deep breath.  · Having trouble falling asleep or staying asleep.  · Sweating.  · Nausea, diarrhea, or irritable bowel syndrome (IBS).  · Headaches.  · Trouble concentrating or remembering facts.  · Restlessness.  · Irritability.    How is this diagnosed?  Your health care provider can diagnose OCTAVIO based on your symptoms and medical history. You will also have a physical exam. The health care provider will ask specific questions about your symptoms, including how severe they are, when they started, and if they come and go. Your health care provider may ask you about your use of alcohol or drugs, including prescription medicines. Your health care provider may refer you to a mental health specialist for further evaluation.  Your health care provider will do a thorough examination and may perform additional tests to rule out other possible causes of your symptoms.  To be diagnosed with OCTAVIO, a person must have anxiety that:  · Is out of his or her control.  · Affects several different aspects of his or her life, such as work and relationships.  · Causes distress that makes him or her unable to take part in normal activities.  · Includes at least three physical symptoms of OCTAVIO, such as restlessness, fatigue, trouble concentrating, irritability, muscle tension, or sleep problems.    Before your health care provider can confirm a diagnosis of OCTAVIO, these symptoms must be present more days than they are not, and they must last for six months or longer.  How is this treated?  The following therapies are usually used to treat OCTAVIO:  · Medicine. Antidepressant medicine is usually prescribed for long-term daily control. Antianxiety medicines may be added in severe cases, especially when panic  attacks occur.  · Talk therapy (psychotherapy). Certain types of talk therapy can be helpful in treating OCTAVIO by providing support, education, and guidance. Options include:  ? Cognitive behavioral therapy (CBT). People learn coping skills and techniques to ease their anxiety. They learn to identify unrealistic or negative thoughts and behaviors and to replace them with positive ones.  ? Acceptance and commitment therapy (ACT). This treatment teaches people how to be mindful as a way to cope with unwanted thoughts and feelings.  ? Biofeedback. This process trains you to manage your body's response (physiological response) through breathing techniques and relaxation methods. You will work with a therapist while machines are used to monitor your physical symptoms.  · Stress management techniques. These include yoga, meditation, and exercise.    A mental health specialist can help determine which treatment is best for you. Some people see improvement with one type of therapy. However, other people require a combination of therapies.  Follow these instructions at home:  · Take over-the-counter and prescription medicines only as told by your health care provider.  · Try to maintain a normal routine.  · Try to anticipate stressful situations and allow extra time to manage them.  · Practice any stress management or self-calming techniques as taught by your health care provider.  · Do not punish yourself for setbacks or for not making progress.  · Try to recognize your accomplishments, even if they are small.  · Keep all follow-up visits as told by your health care provider. This is important.  Contact a health care provider if:  · Your symptoms do not get better.  · Your symptoms get worse.  · You have signs of depression, such as:  ? A persistently sad, cranky, or irritable mood.  ? Loss of enjoyment in activities that used to bring you john.  ? Change in weight or eating.  ? Changes in sleeping habits.  ? Avoiding friends  or family members.  ? Loss of energy for normal tasks.  ? Feelings of guilt or worthlessness.  Get help right away if:  · You have serious thoughts about hurting yourself or others.  If you ever feel like you may hurt yourself or others, or have thoughts about taking your own life, get help right away. You can go to your nearest emergency department or call:  · Your local emergency services (911 in the [place]  · A suicide crisis helpline, such as the National Suicide Prevention Lifeline at 1-554.496.5195. This is open 24 hours a day.    Summary  · Generalized anxiety disorder (OCTAVIO) is a mental health disorder that involves worry that is not triggered by a specific event.  · People with OCTAVIO often worry excessively about many things in their lives, such as their health and family.  · OCTAVIO may cause physical symptoms such as restlessness, trouble concentrating, sleep problems, frequent sweating, nausea, diarrhea, headaches, and trembling or muscle twitching.  · A mental health specialist can help determine which treatment is best for you. Some people see improvement with one type of therapy. However, other people require a combination of therapies.  This information is not intended to replace advice given to you by your health care provider. Make sure you discuss any questions you have with your health care provider.  Document Released: 04/14/2014 Document Revised: 11/07/2017 Document Reviewed: 11/07/2017  [employer] Interactive Patient Education © 2019 [employer]    Gastroesophageal Reflux Disease, Adult  Normally, food travels down the esophagus and stays in the stomach to be digested. If a person has gastroesophageal reflux disease (GERD), food and stomach acid move back up into the esophagus. When this happens, the esophagus becomes sore and swollen (inflamed). Over time, GERD can make small holes (ulcers) in the lining of the esophagus.  Follow these instructions at home:  Diet  · Follow a diet as told by your  doctor. You may need to avoid foods and drinks such as:  ? Coffee and tea (with or without caffeine).  ? Drinks that contain alcohol.  ? Energy drinks and sports drinks.  ? Carbonated drinks or sodas.  ? Chocolate and cocoa.  ? Peppermint and mint flavorings.  ? Garlic and onions.  ? Horseradish.  ? Spicy and acidic foods, such as peppers, chili powder, baker powder, vinegar, hot sauces, and BBQ sauce.  ? Citrus fruit juices and citrus fruits, such as oranges, domitila, and limes.  ? Tomato-based foods, such as red sauce, chili, salsa, and pizza with red sauce.  ? Fried and fatty foods, such as donuts, french fries, potato chips, and high-fat dressings.  ? High-fat meats, such as hot dogs, rib eye steak, sausage, ham, and ibarra.  ? High-fat dairy items, such as whole milk, butter, and cream cheese.  · Eat small meals often. Avoid eating large meals.  · Avoid drinking large amounts of liquid with your meals.  · Avoid eating meals during the 2-3 hours before bedtime.  · Avoid lying down right after you eat.  · Do not exercise right after you eat.  General instructions  · Pay attention to any changes in your symptoms.  · Take over-the-counter and prescription medicines only as told by your doctor. Do not take aspirin, ibuprofen, or other NSAIDs unless your doctor says it is okay.  · Do not use any tobacco products, including cigarettes, chewing tobacco, and e-cigarettes. If you need help quitting, ask your doctor.  · Wear loose clothes. Do not wear anything tight around your waist.  · Raise (elevate) the head of your bed about 6 inches (15 cm).  · Try to lower your stress. If you need help doing this, ask your doctor.  · If you are overweight, lose an amount of weight that is healthy for you. Ask your doctor about a safe weight loss goal.  · Keep all follow-up visits as told by your doctor. This is important.  Contact a doctor if:  · You have new symptoms.  · You lose weight and you do not know why it is  happening.  · You have trouble swallowing, or it hurts to swallow.  · You have wheezing or a cough that keeps happening.  · Your symptoms do not get better with treatment.  · You have a hoarse voice.  Get help right away if:  · You have pain in your arms, neck, jaw, teeth, or back.  · You feel sweaty, dizzy, or light-headed.  · You have chest pain or shortness of breath.  · You throw up (vomit) and your throw up looks like blood or coffee grounds.  · You pass out (faint).  · Your poop (stool) is bloody or black.  · You cannot swallow, drink, or eat.  This information is not intended to replace advice given to you by your health care provider. Make sure you discuss any questions you have with your health care provider.  Document Released: 06/05/2009 Document Revised: 05/25/2017 Document Reviewed: 04/13/2016  [employer] Interactive Patient Education © 2018 [employer]

## 2019-08-07 ENCOUNTER — HOSPITAL ENCOUNTER (OUTPATIENT)
Dept: GENERAL RADIOLOGY | Facility: HOSPITAL | Age: 69
Discharge: HOME | End: 2019-08-07
Admitting: PHYSICIAN ASSISTANT

## 2019-08-07 ENCOUNTER — TELEPHONE (OUTPATIENT)
Dept: FAMILY MEDICINE CLINIC | Facility: CLINIC | Age: 69
End: 2019-08-07

## 2019-08-07 ENCOUNTER — OFFICE VISIT (OUTPATIENT)
Dept: FAMILY MEDICINE CLINIC | Facility: CLINIC | Age: 69
End: 2019-08-07

## 2019-08-07 VITALS
SYSTOLIC BLOOD PRESSURE: 132 MMHG | DIASTOLIC BLOOD PRESSURE: 84 MMHG | HEIGHT: 68 IN | WEIGHT: 157 LBS | OXYGEN SATURATION: 98 % | HEART RATE: 103 BPM | BODY MASS INDEX: 23.79 KG/M2

## 2019-08-07 DIAGNOSIS — M25.571 ACUTE RIGHT ANKLE PAIN: Primary | ICD-10-CM

## 2019-08-07 DIAGNOSIS — S82.454A CLOSED NONDISPLACED COMMINUTED FRACTURE OF SHAFT OF RIGHT FIBULA, INITIAL ENCOUNTER: Primary | ICD-10-CM

## 2019-08-07 DIAGNOSIS — M25.571 ACUTE RIGHT ANKLE PAIN: ICD-10-CM

## 2019-08-07 PROCEDURE — 73610 X-RAY EXAM OF ANKLE: CPT

## 2019-08-07 PROCEDURE — 99213 OFFICE O/P EST LOW 20 MIN: CPT | Performed by: PHYSICIAN ASSISTANT

## 2019-08-07 NOTE — TELEPHONE ENCOUNTER
Spoke with patient.  Xray shows a nondisplaced fracture of her right fibula.  Will place an urgent referral to ortho.  She is aware she should avoid weight bearing on the ankle.  Call back if she hasn't heard from ortho by tomorrow afternoon.

## 2019-08-07 NOTE — PROGRESS NOTES
Chief Complaint   Patient presents with   • Ankle Pain     while walking, stepped on a rock and her foot twisted. Right side        HPI      Dixie Cervantes is a 68 y.o. female who presents for Ankle Pain (while walking, stepped on a rock and her foot twisted. Right side )    Patient presents today with right ankle pain.  She reports falling 2 days ago while walking her dog.  She has significant pain when trying to walk and apply pressure on her ankle.  She reports bruising and swelling which is improved compared to yesterday.  She uses a topical diclofenac cream, Tylenol as needed for pain, ice and compression wrap.  She is using walker to minimize the pressure she applies to her ankle.    Past Medical History:   Diagnosis Date   • Chronic cough     thought to be cough variant asthma   • Chronic low back pain    • Cornea abrasion 2019   • Diverticulosis    • Esophageal spasm    • External otitis     Recurring   • OCTAVIO (generalized anxiety disorder)    • GERD (gastroesophageal reflux disease)    • History of bladder infections    • Insomnia    • Inverted nipple     Left   • Irritable bowel syndrome (IBS)    • Parasitic infection     History of multiple infections   • Urinary, incontinence, stress female    • Vestibular vertigo        Past Surgical History:   Procedure Laterality Date   • APPENDECTOMY     • BREAST CYST ASPIRATION Right    • CHOLECYSTECTOMY     • CYST REMOVAL      Multiple Scalp cysts   • CYST REMOVAL  2019    Located on neck   • CYSTOSCOPY     • LAPAROTOMY OOPHERECTOMY Right    • MOLE REMOVAL      Multiple   • THYROID BIOPSY      Needle biopsy       Family History   Problem Relation Age of Onset   • Diabetes Mother    • Arthritis Mother    • Migraines Mother    • Stroke Mother    • Prostate cancer Father        Social History     Socioeconomic History   • Marital status:      Spouse name: Not on file   • Number of children: Not on file   • Years of education: Not on file   • Highest education  "level: Not on file   Tobacco Use   • Smoking status: Never Smoker   • Smokeless tobacco: Never Used   Substance and Sexual Activity   • Alcohol use: Yes     Comment: 2-3 drinks a month   • Drug use: No       Allergies   Allergen Reactions   • Sulfa Antibiotics Swelling   • Ketek [Telithromycin] Other (See Comments)     Migraine   • Bupropion Irritability       ROS    Review of Systems   Musculoskeletal: Positive for arthralgias, gait problem and joint swelling.   Skin: Positive for color change and bruise. Negative for rash and skin lesions.       Vitals:    08/07/19 0924   BP: 132/84   Pulse: 103   SpO2: 98%   Weight: 71.2 kg (157 lb)   Height: 172.7 cm (68\")   PainSc:   7       Current Outpatient Medications on File Prior to Visit   Medication Sig Dispense Refill   • ALPRAZolam (XANAX) 0.25 MG tablet Take 0.5 tablets by mouth Daily As Needed for Anxiety. 15 tablet 0   • cyclobenzaprine (FLEXERIL) 10 MG tablet Take 10 mg by mouth As Needed for Muscle Spasms.     • diclofenac (VOLTAREN) 1 % gel gel Apply 4 g topically to the appropriate area as directed 4 (Four) Times a Day As Needed.     • glucose blood test strip Test twice daily    E11.9   Contour Next One 100 each 12   • Lancets (TGT LANCET MICRO THIN 33G) misc Test Twice Daily    E11.9 100 each 11   • lidocaine (LIDODERM) 5 % Place 1 patch on the skin as directed by provider Daily. Remove & Discard patch within 12 hours or as directed by MD     • Multiple Vitamins-Minerals (MULTI VITAMIN/MINERALS) tablet Take 1 tablet by mouth.     • neomycin-colistin-hydrocortisone-thonzonium (CORTISPORIN TC) 3.3-3-10-0.5 MG/ML otic suspension Administer 3 drops to the right ear 4 (Four) Times a Day. 10 mL 1   • ondansetron (ZOFRAN) 4 MG tablet Take 1 tablet by mouth Every 8 (Eight) Hours As Needed for Nausea or Vomiting. 60 tablet 0   • traMADol (ULTRAM) 50 MG tablet Take 50 mg by mouth As Needed for Moderate Pain .     • zolpidem (AMBIEN) 10 MG tablet Take 1 tablet by mouth " At Night As Needed for Sleep. Do not take with alprazolam 30 tablet 2     No current facility-administered medications on file prior to visit.        Results for orders placed or performed in visit on 06/06/19   Comprehensive Metabolic Panel   Result Value Ref Range    Glucose 116 (H) 65 - 99 mg/dL    BUN 12 8 - 23 mg/dL    Creatinine 0.88 0.57 - 1.00 mg/dL    Sodium 143 136 - 145 mmol/L    Potassium 4.3 3.5 - 5.2 mmol/L    Chloride 104 98 - 107 mmol/L    CO2 28.5 22.0 - 29.0 mmol/L    Calcium 9.8 8.6 - 10.5 mg/dL    Total Protein 7.1 6.0 - 8.5 g/dL    Albumin 4.60 3.50 - 5.20 g/dL    ALT (SGPT) 16 1 - 33 U/L    AST (SGOT) 15 1 - 32 U/L    Alkaline Phosphatase 70 39 - 117 U/L    Total Bilirubin 0.6 0.2 - 1.2 mg/dL    eGFR Non African Amer 64 >60 mL/min/1.73    Globulin 2.5 gm/dL    A/G Ratio 1.8 g/dL    BUN/Creatinine Ratio 13.6 7.0 - 25.0    Anion Gap 10.5 mmol/L   Magnesium   Result Value Ref Range    Magnesium 2.2 1.6 - 2.4 mg/dL       PE    Physical Exam   Constitutional: She appears well-developed and well-nourished. She is active and cooperative. No distress.   HENT:   Head: Normocephalic and atraumatic.   Eyes: EOM are normal.   Neck: Normal range of motion.   Musculoskeletal:        Right ankle: She exhibits decreased range of motion, swelling and ecchymosis. She exhibits no deformity and no laceration. Tenderness. Lateral malleolus tenderness found. No head of 5th metatarsal tenderness found. Achilles tendon exhibits no pain.   Neurological: She is alert.   Skin: Skin is warm. She is not diaphoretic. No erythema.   Psychiatric: She has a normal mood and affect. Her speech is normal and behavior is normal. Judgment and thought content normal. She is not actively hallucinating. She is attentive.        A/P    Dixie was seen today for ankle pain.    Diagnoses and all orders for this visit:    Acute right ankle pain  -with ecchymosis and swelling secondary to fall 2 days ago  -unable to bear weight secondary  to pain  -will order xray  -continue ice, elevation, compression wrap and rest  -tylenol prn for pain  -     XR Ankle 3+ View Right; Future         Plan of care reviewed with patient at the conclusion of today's visit. Education was provided regarding diagnosis, management and any prescribed or recommended OTC medications.  Patient verbalizes understanding of and agreement with management plan.    No Follow-up on file.     Sarina Parada PA-C

## 2019-08-07 NOTE — TELEPHONE ENCOUNTER
PT CALLED TO CHECK STATUS OF ORTHOPAEDICS, PT WAS ADVISED THAT THEY WERE WORKING ON THE REFERRAL AND THAT IT WAS MARKED URGENT AND THAT SHE WOULD BE  CONTACTED AS SOON AS IT IS SCHEDULED

## 2019-08-08 ENCOUNTER — OFFICE VISIT (OUTPATIENT)
Dept: ORTHOPEDIC SURGERY | Facility: CLINIC | Age: 69
End: 2019-08-08

## 2019-08-08 VITALS — HEART RATE: 80 BPM | HEIGHT: 68 IN | WEIGHT: 156.97 LBS | OXYGEN SATURATION: 97 % | BODY MASS INDEX: 23.79 KG/M2

## 2019-08-08 DIAGNOSIS — S82.831A OTHER CLOSED FRACTURE OF DISTAL END OF RIGHT FIBULA, INITIAL ENCOUNTER: ICD-10-CM
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
## 2019-08-08 DIAGNOSIS — M25.571 ACUTE RIGHT ANKLE PAIN: Primary | ICD-10-CM

## 2019-08-08 PROCEDURE — 27786 TREATMENT OF ANKLE FRACTURE: CPT | Performed by: PHYSICIAN ASSISTANT

## 2019-08-08 PROCEDURE — 99214 OFFICE O/P EST MOD 30 MIN: CPT | Performed by: PHYSICIAN ASSISTANT

## 2019-08-08 NOTE — PROGRESS NOTES
[facility] [place] [facility] [place]    Subjective     Chief Complaint   Patient presents with   • Right Ankle - Pain     Walking dog 08/05/2019- rolled ankle on concrete        HPI  Dixie Cervantes is a 68 y.o. female.  Presents orthopedic clinic for evaluation of her right ankle.  On the above-stated date she was walking her dog and had an inversion injury to her ankle.  Since then she is been unable to weight-bear secondary to the pain.  Initially she thought she only sprained her ankle but has noted worsening swelling and bruising.  She presents today with multiple layers of cardboard used to stabilize the ankle with an Ace wrap and using a Rollator.  No reported numbness or tingling.  At this time she endorses a pain scale 7-8/9.  Severity the pain moderate to severe.  Quality the pain dull, aching, throbbing, stabbing.  Associated symptoms swelling and bruising.  Symptoms are worse with any type of walking, standing, stair climbing etc.  She is trying to keep it elevated and ice as much as possible.  She is mainly using Tylenol for pain control.  No reported numbness or tingling.    Patient denies any fever, chills, night sweats or other constitutional symptoms.    Past Medical History:   Diagnosis Date   • Chronic cough     thought to be cough variant asthma   • Chronic low back pain    • Cornea abrasion 2019   • Diverticulosis    • Esophageal spasm    • External otitis     Recurring   • OCTAVIO (generalized anxiety disorder)    • GERD (gastroesophageal reflux disease)    • History of bladder infections    • Insomnia    • Inverted nipple     Left   • Irritable bowel syndrome (IBS)    • Parasitic infection     History of multiple infections   • Urinary, incontinence, stress female    • Vestibular vertigo       Past Surgical History:   Procedure Laterality Date   • APPENDECTOMY     • BREAST CYST ASPIRATION Right    • CHOLECYSTECTOMY     • CYST REMOVAL      Multiple Scalp cysts   • CYST REMOVAL  2019    Located on  neck   • CYSTOSCOPY     • LAPAROTOMY OOPHERECTOMY Right    • MOLE REMOVAL      Multiple   • THYROID BIOPSY      Needle biopsy      Family History   Problem Relation Age of Onset   • Diabetes Mother    • Arthritis Mother    • Migraines Mother    • Stroke Mother    • Prostate cancer Father      Social History     Socioeconomic History   • Marital status:      Spouse name: Not on file   • Number of children: Not on file   • Years of education: Not on file   • Highest education level: Not on file   Tobacco Use   • Smoking status: Never Smoker   • Smokeless tobacco: Never Used   Substance and Sexual Activity   • Alcohol use: Yes     Comment: 2-3 drinks a month   • Drug use: No      Current Outpatient Medications on File Prior to Visit   Medication Sig Dispense Refill   • ALPRAZolam (XANAX) 0.25 MG tablet Take 0.5 tablets by mouth Daily As Needed for Anxiety. 15 tablet 0   • cyclobenzaprine (FLEXERIL) 10 MG tablet Take 10 mg by mouth As Needed for Muscle Spasms.     • diclofenac (VOLTAREN) 1 % gel gel Apply 4 g topically to the appropriate area as directed 4 (Four) Times a Day As Needed.     • glucose blood test strip Test twice daily    E11.9   Contour Next One 100 each 12   • Lancets (TGT LANCET MICRO THIN 33G) misc Test Twice Daily    E11.9 100 each 11   • lidocaine (LIDODERM) 5 % Place 1 patch on the skin as directed by provider Daily. Remove & Discard patch within 12 hours or as directed by MD     • Multiple Vitamins-Minerals (MULTI VITAMIN/MINERALS) tablet Take 1 tablet by mouth.     • neomycin-colistin-hydrocortisone-thonzonium (CORTISPORIN TC) 3.3-3-10-0.5 MG/ML otic suspension Administer 3 drops to the right ear 4 (Four) Times a Day. 10 mL 1   • ondansetron (ZOFRAN) 4 MG tablet Take 1 tablet by mouth Every 8 (Eight) Hours As Needed for Nausea or Vomiting. 60 tablet 0   • traMADol (ULTRAM) 50 MG tablet Take 50 mg by mouth As Needed for Moderate Pain .     • zolpidem (AMBIEN) 10 MG tablet Take 1 tablet by  "mouth At Night As Needed for Sleep. Do not take with alprazolam 30 tablet 2     No current facility-administered medications on file prior to visit.       Allergies   Allergen Reactions   • Sulfa Antibiotics Swelling   • Ketek [Telithromycin] Other (See Comments)     Migraine   • Bupropion Irritability        The following portions of the patient's history were reviewed and updated as appropriate: allergies, current medications, past family history, past medical history, past social history, past surgical history and problem list.    Review of Systems   Constitutional: Negative.    HENT: Negative.    Eyes: Negative.    Respiratory: Negative.    Cardiovascular: Negative.    Gastrointestinal: Negative.    Endocrine: Negative.    Genitourinary: Negative.    Musculoskeletal: Positive for arthralgias.   Skin: Negative.    Allergic/Immunologic: Negative.    Neurological: Negative.    Hematological: Negative.    Psychiatric/Behavioral: Negative.         Objective      Physical Exam  Pulse 80   Ht 172.7 cm (67.99\")   Wt 71.2 kg (156 lb 15.5 oz)   SpO2 97%   BMI 23.87 kg/m²     Body mass index is 23.87 kg/m².    GENERAL APPEARANCE: awake, alert & oriented x 3, in no acute distress and well developed, well nourished  PSYCH: normal mood and affect  LUNGS:  breathing nonlabored, no wheezing  EYES: sclera anicteric, pupils equal  CARDIOVASCULAR: palpable pulses dorsalis pedis, palpable posterior tibial bilaterally. Capillary refill less than 2 seconds  INTEGUMENTARY: skin intact, no clubbing, cyanosis  NEUROLOGIC:  Normal Sensation and reflexes         Ortho Exam  Peripheral Vascular:  Lower Extremity:  Inspection:  Right--rapid capillary refill  Palpation:  Dorsalis pedis pulse:   Right--normal    Neurologic  Sensory:    Light Touch:     Right foot:  Dorsal intact and plantar intact       Overall Assessment of Muscle Strength and Tone:  Lower Extremities:     Right:  Tibialis anterior--5/5    Gastroc " soleus--5/5    EHL--5/5    FHL--5/5      Musculoskeletal  Lower Extremity  Ankle/Foot:  Inspection and Palpation:     Right:  Tenderness: Positive tenderness throughout the lateral aspect of the ankle.  Mild discomfort noted medially.  Positive ecchymosis predominantly to the lateral aspect the ankle.    Swelling: Positive swelling noted throughout the ankle.  All compartments remain soft and compressible.    Effusion:  None    Crepitus:  None     ROM:   Right:  Plantarflexion--limited secondary to pain    Dorsiflexion--limited secondary to pain    Inversion--limited secondary to pain    Eversion--limited secondary to pain    Instability:     Right:  Anterior drawer test--not tested    Squeeze test--not tested    Talar tilt test--not tested      Imaging/Studies  Imaging Results (last 7 days)     ** No results found for the last 168 hours. **      Dr. Salmeron and I reviewed her plain films performed on 8/7/2019 by her PCM showed a nondisplaced transverse fracture of the distal fibula.  See chart for official results.      Assessment/Plan        ICD-10-CM ICD-9-CM   1. Acute right ankle pain M25.571 719.47     338.19   2. Other closed fracture of distal end of right fibula, initial encounter S82.831A 824.8   3. Fall, initial encounter W19.XXXA E888.9       No orders of the defined types were placed in this encounter.       -Right ankle pain secondary to a nondisplaced transverse fracture of the distal fibula.  -Secondary to the amount of swelling patient was placed in a 3 sided fiberglass short leg splint today.  -Recommend over-the-counter pain medications as needed.  -Discussed elevation to help assist with inflammation/pain control.  -Prescription was given for a knee scooter, so that she can remain nonweightbearing.  -Follow-up in 1 week for repeat evaluation this will include pre-clinic imaging out of the splint with intensive patient to transitioning to cast if swelling has decreased enough.  If there is been  any movement or change of the fracture fragment then we discussed the possibility of surgical intervention if necessary.  -Questions and concerns answered.      Medical Decision Making  Management Options : over-the-counter medicine and close treatment of fracture or dislocation  Data/Risk: radiology tests       Tracee Santiago PA-C  08/11/19  11:42 AM         EMR Dragon/Transcription disclaimer:  Much of this encounter note is an electronic transcription of spoken language to printed text. Electronic transcription of spoken language may permit erroneous, or at times, nonsensical words or phrases to be inadvertently transcribed. Although I have reviewed the note for such errors, some may still exist.

## 2019-08-15 ENCOUNTER — OFFICE VISIT (OUTPATIENT)
Dept: ORTHOPEDIC SURGERY | Facility: CLINIC | Age: 69
End: 2019-08-15

## 2019-08-15 DIAGNOSIS — Z09 FRACTURE FOLLOW-UP: Primary | ICD-10-CM

## 2019-08-15 DIAGNOSIS — S82.831S OTHER CLOSED FRACTURE OF DISTAL END OF RIGHT FIBULA, SEQUELA: ICD-10-CM

## 2019-08-15 PROCEDURE — 99024 POSTOP FOLLOW-UP VISIT: CPT | Performed by: PHYSICIAN ASSISTANT

## 2019-08-15 PROCEDURE — 29405 APPL SHORT LEG CAST: CPT | Performed by: PHYSICIAN ASSISTANT

## 2019-08-15 NOTE — PROGRESS NOTES
[facility] [place] [facility] Note    Subjective     CC: Follow-up of the Right Ankle (1 week -  nondisplaced transverse fracture of the distal fibula (DOI 08/05/2019) )      RIKI Cervantes is a 68 y.o. female.  Patient returns today for follow-up evaluation right ankle distal fibular fracture.  She was placed in a 3 sided splint last week.  She was brought back today for fracture surveillance Insana for swelling was improved enough to be placed in a cast.  At this time she has no new complaints or issues.  She continues to endorse a pain scale is 6-7/10.  Severity the pain moderate.  Quality pain aching, dull, throbbing, stabbing.  Associated symptoms swelling.  Patient is currently using a Rollator to help with ambulation and to remain nonweightbearing to the right lower extremity.  Pain control is through the use of Tylenol.      ROS:    Constiutional:Pt denies fever, chills, nausea, or vomiting.  MSK:as above    Objective      Past Medical History  Past Medical History:   Diagnosis Date   • Chronic cough     thought to be cough variant asthma   • Chronic low back pain    • Cornea abrasion 2019   • Diverticulosis    • Esophageal spasm    • External otitis     Recurring   • OCTAVIO (generalized anxiety disorder)    • GERD (gastroesophageal reflux disease)    • History of bladder infections    • Insomnia    • Inverted nipple     Left   • Irritable bowel syndrome (IBS)    • Parasitic infection     History of multiple infections   • Urinary, incontinence, stress female    • Vestibular vertigo          Physical Exam  There were no vitals taken for this visit.    There is no height or weight on file to calculate BMI.    Patient is well nourished and well developed.        Ortho Exam  Right ankle  Skin: Grossly intact without any redness or warmth.  Positive improved soft tissue swelling.  She still has ecchymosis to both lateral and now medial aspect of the ankle. Tenderness: Positive directly over the known  fracture area.  Negative tenderness medial ankle.  Motor: Grossly intact to anterior tib, gastroc, EHL, FHL.  Sensory: Grossly intact light touch to DP, SP, S, S, T nerve distributions.  Vascular: 2+ DP with brisk capillary refill noted into each digit.      Imaging/Labs/EMG Reviewed:  Ordered plain film imaging of the right ankle.  All images reviewed by Dr. Salmeron.    Indication: Right ankle fracture     Comparison: Todays xrays were compared to previous xrays from 8/7/2019      IMPRESSION:      Right ankle 3 views: Radiographs demonstrate a symmetric ankle mortise with a nondisplaced Dillon A lateral malleolar ankle fracture.  Alignment remains unchanged compared to prior radiographs.      Assessment:  1. Fracture follow-up    2. Other closed fracture of distal end of right fibula, sequela        Plan:  1. Follow-up fracture surveillance of a right distal fibular fracture, stable and nondisplaced.  2. Swelling is improved enough that she can be placed in a short leg fiberglass cast today.  3. Patient will remain nonweightbearing.  4. Continue use of Tylenol as needed for pain control.  5. Follow-up in 3 weeks for repeat evaluation, sooner if issues arise or symptoms worsen/change.  At that appointment she will need pre-clinic imaging of the right ankle out of the cast.  6. Questions and concerns answered.      Tracee Santiago PA-C  08/16/19  11:42 AM

## 2019-08-21 ENCOUNTER — TELEPHONE (OUTPATIENT)
Dept: ORTHOPEDIC SURGERY | Facility: CLINIC | Age: 69
End: 2019-08-21

## 2019-08-21 NOTE — TELEPHONE ENCOUNTER
PATIENT HAS SOME QUESTIONS ABOUT THE CAST. THE CAST DOES FEEL LOOSE AND IS HAVING MORE PAIN THAN SHE HAS HAD IN THE PAST. SHE IS AFRAID IT IS RUBBING IN THE ANKLE AND IT IS STARTING TO HURT MORE. SHE CAN BE REACHED [phone].

## 2019-08-21 NOTE — TELEPHONE ENCOUNTER
"Called patient back- she states that the cast can move about 1/4\", and she does not think that it is rubbing her, but she was concerned. I told her that it can be normal for it to move some- especially once the swelling goes down. She is still keeping it elevated. She will call back if it seems to get looser, or starts to rub or cause her problems.     Dorina  "

## 2019-09-05 ENCOUNTER — OFFICE VISIT (OUTPATIENT)
Dept: ORTHOPEDIC SURGERY | Facility: CLINIC | Age: 69
End: 2019-09-05

## 2019-09-05 VITALS — BODY MASS INDEX: 23.79 KG/M2 | HEART RATE: 104 BPM | WEIGHT: 156.97 LBS | HEIGHT: 68 IN | OXYGEN SATURATION: 98 %

## 2019-09-05 DIAGNOSIS — S82.831D OTHER CLOSED FRACTURE OF DISTAL END OF RIGHT FIBULA WITH ROUTINE HEALING, SUBSEQUENT ENCOUNTER: ICD-10-CM

## 2019-09-05 DIAGNOSIS — Z09 FRACTURE FOLLOW-UP: Primary | ICD-10-CM

## 2019-09-05 PROCEDURE — 99024 POSTOP FOLLOW-UP VISIT: CPT | Performed by: PHYSICIAN ASSISTANT

## 2019-09-05 NOTE — PROGRESS NOTES
"    [facility] [place] [facility] [place]    Subjective     Follow-up of the Right Ankle (3 week f/u/nondisplaced transverse fracture of the distal fibula (DOI 08/05/2019) )       RIKI Cervantes is a 68 y.o. female.  Patient returns today approximately 4.5 weeks out from date of injury.  She is been casted during this period.  At this time she endorses a pain scale of 4-5/10.  Severity the pain mild to moderate.  Quality pain dull, aching, throbbing.  She is been elevating and Tylenol for pain control.  She denies any numbness or tingling into the extremity.  Patient has remained nonweightbearing as directed.      Objective      Physical Exam  Pulse 104   Ht 172.7 cm (67.99\")   Wt 71.2 kg (156 lb 15.5 oz)   SpO2 98%   Breastfeeding? No   BMI 23.87 kg/m²     Body mass index is 23.87 kg/m².        Ortho Exam  Right ankle  Cast removed  Skin: Grossly intact without any redness or warmth.    Only slight soft tissue swelling noted over the lateral malleolus otherwise all other swelling has resolved.  She also has remaining ecchymosis lateral aspect of the calcaneus.    Tenderness: Positive directly over the known fracture area.  Negative tenderness medial ankle.  Motor: Grossly intact to anterior tib, gastroc, EHL, FHL.  Sensory: Grossly intact light touch to DP, SP, S, S, T nerve distributions.  Vascular: 2+ DP with brisk capillary refill noted into each digit.      Imaging/Studies  Ordered right ankle plain films.  Imaging reviewed by Dr. Salmeron.    Imaging Results (last 24 hours)     Procedure Component Value Units Date/Time    XR Ankle 3+ View Right [[id number]] Resulted:  09/05/19 1037     Updated:  09/05/19 1038    Narrative:       Indication: Right ankle fracture    Comparison: Todays xrays were compared to previous xrays from 8/15/2019      Impression:            Right ankle 3 views: Radiographs demonstrate consolidation of the Dillon A   lateral malleolar ankle fracture.  No change in alignment compared " to   prior radiographs.  Ankle mortise remains symmetric.                Assessment:  1. Fracture follow-up    2. Other closed fracture of distal end of right fibula with routine healing, subsequent encounter        Plan:  1. Follow-up fracture right distal fibular fracture, stable and healing.  2. Placed in a nonpneumatic boot today.  3. May begin progressive weightbearing.  4. Continue use of Tylenol as needed for pain control.  5. Follow-up in 4 weeks for repeat evaluation, sooner if issues arise or symptoms worsen/change.  At that appointment she will need pre-clinic imaging of the right ankle out of the boot.  6. Questions and concerns answered.      Tracee Santiago PA-C  09/05/19  9:52 PM

## 2019-10-10 ENCOUNTER — OFFICE VISIT (OUTPATIENT)
Dept: ORTHOPEDIC SURGERY | Facility: CLINIC | Age: 69
End: 2019-10-10

## 2019-10-10 DIAGNOSIS — Z09 FRACTURE FOLLOW-UP: Primary | ICD-10-CM

## 2019-10-10 DIAGNOSIS — S82.831D OTHER CLOSED FRACTURE OF DISTAL END OF RIGHT FIBULA WITH ROUTINE HEALING, SUBSEQUENT ENCOUNTER: ICD-10-CM

## 2019-10-10 PROCEDURE — 99024 POSTOP FOLLOW-UP VISIT: CPT | Performed by: PHYSICIAN ASSISTANT

## 2019-10-10 NOTE — PROGRESS NOTES
[facility] [place] [facility] Note    Subjective     CC: Follow-up ((4 week f/u/nondisplaced transverse fracture of the distal fibula (DOI 08/05/2019) ))      RIKI Cervantes is a 68 y.o. female.  Patient returns today 9+ weeks out from date of injury resulting in distal fibular fracture.  She still wearing her fracture boot.  She is also still using a Rollator.  She states that if she tries to use a walker or crutches she feels very unstable/uneven and is concerned that she might fall and reinjured her ankle.  At this time she endorses a pain scale of 2/10.  Severity the pain mild.  Quality the pain dull, aching.  Associated symptoms some stiffness.  Symptoms are worse with walking, standing.  She does periodically remove the boot to perform gentle range of motion.  She reports seldom use of any pain medication.  She denies any numbness or tingling into the extremity.      ROS:    Constiutional:Pt denies fever, chills, nausea, or vomiting.  MSK:as above    Objective      Past Medical History  Past Medical History:   Diagnosis Date   • Chronic cough     thought to be cough variant asthma   • Chronic low back pain    • Cornea abrasion 2019   • Diverticulosis    • Esophageal spasm    • External otitis     Recurring   • OCTAVIO (generalized anxiety disorder)    • GERD (gastroesophageal reflux disease)    • History of bladder infections    • Insomnia    • Inverted nipple     Left   • Irritable bowel syndrome (IBS)    • Parasitic infection     History of multiple infections   • Urinary, incontinence, stress female    • Vestibular vertigo          Physical Exam  There were no vitals taken for this visit.    There is no height or weight on file to calculate BMI.    Patient is well nourished and well developed.        Ortho Exam  Right ankle  Skin: Grossly intact without erythema, warmth, swelling.  Tenderness: Lateral malleolus very slight discomfort.  Medial malleolus negative.  ATFL negative.  CFL negative.   Deltoid ligament negative.  Prox Tib-Fib negative.  Motion: Dorsiflexion 10°, plantarflexion 40°, inversion 10°, eversion 10°.  Instability: Anterior drawer negative.  Talar tilt negative.  Forced external rotation negative.    Motor: Grossly intact Q/HS/TA/GS/EHL/P.  Sensory: Grossly intact to light touch DP/SP/S/S/T nerve distributions. Gossly intact L2-S1.  Vascular: 2+ DP/PT pulses with brisk capillary refill into each digit      Imaging/Labs/EMG Reviewed:  Ordered right ankle plain films.  Imaging reviewed by Dr. Salmeron.    Imaging Results (last 24 hours)     Procedure Component Value Units Date/Time    XR Ankle 3+ View Right [[id number]] Resulted:  10/10/19 1314     Updated:  10/10/19 1314    Narrative:       Indication: Right ankle fracture    Comparison: Todays xrays were compared to previous xrays from 9/5/2019      Impression:            Right ankle 3 views: Radiographs demonstrate consolidation of the distal   fibula fracture.  Ankle mortise remains symmetric.  There is diffuse   osteopenia.  No change in alignment compared to prior radiographs.          Assessment:  1. Fracture follow-up    2. Other closed fracture of distal end of right fibula with routine healing, subsequent encounter        Plan:  1. Right distal fibular fracture, stable and healing.  2. Patient was provided an Aircast stirrup splint today.  She can transition over the next week out of the boot and into the splint.  3. Stressed the importance of weightbearing to help build up her bone mineral density as her imaging does show diffuse osteopenia.  4. Continue use of Tylenol as needed for pain control.  5. Offered to send the patient to formal PT to work on range of motion and strengthening which he politely declined.  She feels she can do the exercises on her own at home.  6. Follow-up in 4 weeks for repeat evaluation, sooner if issues arise or symptoms worsen/change.  At that appointment she will need pre-clinic imaging of the right  ankle.  Anticipate that appointment being her final follow-up visit.  7. Questions and concerns answered.    Case discussed with Dr. Salmeron who agrees the above assessment and plan.      Tracee Santiago PA-C  10/11/19  12:52 PM

## 2019-10-18 ENCOUNTER — OFFICE VISIT (OUTPATIENT)
Dept: FAMILY MEDICINE CLINIC | Facility: CLINIC | Age: 69
End: 2019-10-18

## 2019-10-18 VITALS
BODY MASS INDEX: 23.98 KG/M2 | HEART RATE: 86 BPM | SYSTOLIC BLOOD PRESSURE: 124 MMHG | DIASTOLIC BLOOD PRESSURE: 80 MMHG | HEIGHT: 68 IN | OXYGEN SATURATION: 98 % | WEIGHT: 158.2 LBS

## 2019-10-18 DIAGNOSIS — G47.00 INSOMNIA, UNSPECIFIED TYPE: ICD-10-CM

## 2019-10-18 PROCEDURE — 99213 OFFICE O/P EST LOW 20 MIN: CPT | Performed by: NURSE PRACTITIONER

## 2019-10-18 RX ORDER — ZOLPIDEM TARTRATE 10 MG/1
10 TABLET ORAL NIGHTLY PRN
Qty: 30 TABLET | Refills: 2 | Status: SHIPPED | OUTPATIENT
Start: 2019-10-18 | End: 2020-01-16 | Stop reason: SDUPTHER

## 2019-10-18 NOTE — PROGRESS NOTES
Subjective   Dixie Cervantes is a 68 y.o. female.   Chief Complaint   Patient presents with   • Insomnia f/u       History of Present Illness   Patient is here for follow up for insomnia, she has been on ambien  for years, first prescribed by Dr. Duggan, she denies any unusual behaviors or daytime sleepiness from the medication.  Since last visit, she broke her right ankle, she is being followed by ortho.   The following portions of the patient's history were reviewed and updated as appropriate: allergies, current medications, past family history, past medical history, past social history, past surgical history and problem list.    Review of Systems   Constitutional: Negative for chills, fatigue and fever.   Respiratory: Negative for shortness of breath.    Cardiovascular: Negative for chest pain and palpitations.   Genitourinary: Negative for difficulty urinating and dysuria.   Musculoskeletal: Positive for arthralgias.   Neurological: Positive for light-headedness (occ). Negative for dizziness and headaches.   Psychiatric/Behavioral: Positive for sleep disturbance.       Objective   Physical Exam   Constitutional: She is oriented to person, place, and time. She appears well-developed and well-nourished. No distress.   Eyes: Conjunctivae are normal.   Cardiovascular: Normal rate and regular rhythm.   Pulmonary/Chest: Effort normal and breath sounds normal. No stridor. No respiratory distress. She has no wheezes.   Neurological: She is alert and oriented to person, place, and time. Coordination normal.   Skin: She is not diaphoretic.   Psychiatric: She has a normal mood and affect. Her behavior is normal. Judgment and thought content normal. Cognition and memory are normal. She is attentive.   Nursing note and vitals reviewed.      Assessment/Plan   Dixie was seen today for insomnia f/u.    Diagnoses and all orders for this visit:    Insomnia, unspecified type  -     zolpidem (AMBIEN) 10 MG tablet; Take 1 tablet by  mouth At Night As Needed for Sleep. Do not take with alprazolam      This patient is on a controlled substance which improves symptoms/quality of life and is aware of the risks, benefits and possible side-effects current treatment. The patient denies any medication side-effects at this time. A controlled substance agreement will be obtained or is currently on file. We reviewed required monitoring for controlled substances including but not limited to quarterly follow-up visits, annual depression screening, and urine drug screens to which the patient is agreeable. A MADY report has been or shortly will be reviewed. There are no signs of deviation or misuse.

## 2019-11-07 ENCOUNTER — OFFICE VISIT (OUTPATIENT)
Dept: ORTHOPEDIC SURGERY | Facility: CLINIC | Age: 69
End: 2019-11-07

## 2019-11-07 VITALS — BODY MASS INDEX: 23.99 KG/M2 | OXYGEN SATURATION: 98 % | HEART RATE: 107 BPM | WEIGHT: 158.29 LBS | HEIGHT: 68 IN

## 2019-11-07 DIAGNOSIS — S82.831D OTHER CLOSED FRACTURE OF DISTAL END OF RIGHT FIBULA WITH ROUTINE HEALING, SUBSEQUENT ENCOUNTER: ICD-10-CM

## 2019-11-07 DIAGNOSIS — Z09 FRACTURE FOLLOW-UP: Primary | ICD-10-CM

## 2019-11-07 PROCEDURE — 99212 OFFICE O/P EST SF 10 MIN: CPT | Performed by: PHYSICIAN ASSISTANT

## 2019-11-07 NOTE — PROGRESS NOTES
"    [facility] [place] [facility] Note    Subjective     Follow-up (4 week recheck - Other closed fracture of distal end of right fibula )  DOI:  8/5/2019    [facility]    Dixie Cervantes is a 69 y.o. female.  Patient returns now 3 months out from right distal fibula fracture.  After her last visit she was placed in a Aircast splint which she is been wearing.  She is been weightbearing.  She does use a cane for longer distances.  At this time she endorses a pain scale 2/10.  Severity the pain mild.  Quality the pain dull, aching.  Associated symptoms stiffness.  She takes Tylenol occasionally for pain control.  No reported numbness or tingling into the extremity.    Patient denies any fever, chills, night sweats or other constitutional symptoms.      Objective      Physical Exam  Pulse 107   Ht 172.7 cm (67.99\")   Wt 71.8 kg (158 lb 4.6 oz)   SpO2 98%   BMI 24.07 kg/m²     Body mass index is 24.07 kg/m².        Ortho Exam  Right ankle  Skin: Grossly intact without any redness warmth or swelling.  Tenderness: No palpable tenderness on today's exam.  Motion: Dorsiflexion 10 degrees, plantarflexion 40 degrees.  Instability: No evidence of ligamentous instability with anterior drawer, talar tilt or forced external rotation.  Motor/sensory grossly intact L2-S1.      Imaging/Studies  Ordered right ankle plain films.  Imaging read by Dr. Salmeron.    Imaging Results (Last 24 Hours)     Procedure Component Value Units Date/Time    XR Ankle 3+ View Right [[id number]] Resulted:  11/07/19 1136     Updated:  11/07/19 1137    Narrative:       Indication: Right ankle fracture    Comparison: Todays xrays were compared to previous xrays from 10/10/2019       Impression:            Right ankle 3 views: Radiographs demonstrate symmetric ankle mortise with   well-healed lateral malleolar ankle fracture.  No change in alignment.    Diffuse osteopenia is visualized.            Assessment:  1. Fracture follow-up    2. Other closed fracture of " distal end of right fibula with routine healing, subsequent encounter        Plan:  1. Right distal fibular fracture, stable and healing.  2. Ordered formal PT to help assist with range of motion, stretching and strengthening.  3. PT can also help her gradually transition out of the Aircast splint.  4. May continue use of Tylenol as needed.  5. Patient asked if she could return to swimming which I stated would be a good avenue for exercise to all extremities.  6. Follow-up in 2 months for repeat evaluation, anticipate final visit.  This will include pre-clinic x-rays of the right ankle.  7. Questions and concerns answered.     Case discussed with Dr. Salmeron who agrees the above assessment and plan.      Tracee Santiago PA-C  11/07/19  1:15 PM

## 2019-11-27 ENCOUNTER — TELEPHONE (OUTPATIENT)
Dept: ORTHOPEDIC SURGERY | Facility: CLINIC | Age: 69
End: 2019-11-27

## 2019-11-27 NOTE — TELEPHONE ENCOUNTER
I left a message for Lizzette to let her know that the patient is to transition out of the aircast.  Joie

## 2019-11-27 NOTE — TELEPHONE ENCOUNTER
SOLANGE WOULD LIKE TO KNOW ID PREM IS ABLE TO GRADUALLY TRANSITION OUT OF AIRCAST BEFORE SHE IS RE-IMAGED ON 01/09/2020. PREM IS VERY NERVOUS TO DO TOO MUCH. LEAVE MESSAGE WITH STAFF IF SOLANGE IS NOT IN.

## 2019-12-09 ENCOUNTER — OFFICE VISIT (OUTPATIENT)
Dept: ORTHOPEDIC SURGERY | Facility: CLINIC | Age: 69
End: 2019-12-09

## 2019-12-09 ENCOUNTER — TELEPHONE (OUTPATIENT)
Dept: ORTHOPEDIC SURGERY | Facility: CLINIC | Age: 69
End: 2019-12-09

## 2019-12-09 VITALS — BODY MASS INDEX: 23.99 KG/M2 | HEIGHT: 68 IN | WEIGHT: 158.29 LBS | OXYGEN SATURATION: 98 % | HEART RATE: 126 BPM

## 2019-12-09 DIAGNOSIS — Z09 FRACTURE FOLLOW-UP: Primary | ICD-10-CM

## 2019-12-09 DIAGNOSIS — S82.831D OTHER CLOSED FRACTURE OF DISTAL END OF RIGHT FIBULA WITH ROUTINE HEALING, SUBSEQUENT ENCOUNTER: ICD-10-CM

## 2019-12-09 PROCEDURE — 99212 OFFICE O/P EST SF 10 MIN: CPT | Performed by: PHYSICIAN ASSISTANT

## 2019-12-09 NOTE — PROGRESS NOTES
"    [facility] [place] [place]    Subjective     CC: Follow-up of the Right Ankle (4 week-Other closed fracture of distal end of right fibula with routine healing)  DOI: 8/5/2019    RIKI Cervantes is a 69 y.o. female.  Patient returns for follow-up evaluation of her right ankle.  Last Thursday she was transitioned out of her Aircast into a lace up brace and had therapy.  By Friday she had significant increased pain, swelling and bruising to the lateral aspect of the ankle.  Because of the increased pain she returned to wearing her fracture boot and using her scooter to limit weightbearing.  At this point she endorses a pain scale of 5-6/10.  Severity the pain moderate.  Quality pain aching, stabbing.  Associated symptoms swelling and bruising.  Symptoms are worse with walking, standing, stair climbing it does affect her sleep.  She is currently taking tramadol as well as Tylenol as needed for pain control.  No reported numbness or tingling.    It should be noted patient had no pain while she was wearing a Aircast it was only after she transitioned into the lace up brace.    ROS:    Constiutional:Pt denies fever, chills, nausea, or vomiting.  MSK:as above    Objective      Past Medical History  Past Medical History:   Diagnosis Date   • Chronic cough     thought to be cough variant asthma   • Chronic low back pain    • Cornea abrasion 2019   • Diverticulosis    • Esophageal spasm    • External otitis     Recurring   • OCTAVIO (generalized anxiety disorder)    • GERD (gastroesophageal reflux disease)    • History of bladder infections    • Insomnia    • Inverted nipple     Left   • Irritable bowel syndrome (IBS)    • Parasitic infection     History of multiple infections   • Urinary, incontinence, stress female    • Vestibular vertigo          Physical Exam  Pulse (!) 126   Ht 172.7 cm (67.99\")   Wt 71.8 kg (158 lb 4.6 oz)   SpO2 98%   BMI 24.07 kg/m²     Body mass index is 24.07 kg/m².    Patient is " well nourished and well developed.        Ortho Exam  Right ankle  Skin: Grossly intact without any redness or warmth.  She does have mild soft tissue  swelling noted to the lateral aspect of the ankle with ecchymosis present to the anterior lateral aspect ankle.  No rashes or lesions.  Tenderness: Positive tenderness noted specially soft tissue structures noted to the lateral ankle along the peroneal tendons and ATFL.  No significant tenderness noted distal fibula.  Motion: Dorsiflexion plantarflexion are grossly intact however with eversion and inversion she does have increased pain.  Instability: Anterior drawer, forced external rotation and talar tilt negative.  Motor/sensory: Grossly intact L2-S1.      Imaging/Labs/EMG Reviewed:  Ordered right ankle plain films.  Imaging read by Dr. Enriquez.    Right Ankle X-Ray  Indication: Pain  Views: AP, Lateral, Mortise     Findings:   Healing distal fibula fracture  No bony lesion  Soft tissues normal  Normal joint spaces with mortise well-aligned, no evidence of syndesmosis widening      prior studies available for comparison.      Assessment:  1. Fracture follow-up    2. Other closed fracture of distal end of right fibula with routine healing, subsequent encounter        Plan:  1. Right distal fibular fracture, stable and healing.  2. Patient does not need any weightbearing restrictions.  3. Patient may return to wearing Aircast and slowly transition into a lace up brace.  4. She is to continue working with physical therapy.  5. Continue use of Tylenol as needed.  Minimize how much tramadol she is taking.  6. Follow-up in 2 months for repeat evaluation, sooner if issues arise or symptoms worsen/change.  This will include a final imaging of the right ankle.  7. Questions and concerns answered.     Case discussed with Dr. Enriquez who agrees the above assessment and plan.      Tracee Santiago PA-C  12/10/19  2:26 PM

## 2020-01-06 ENCOUNTER — TELEPHONE (OUTPATIENT)
Dept: ORTHOPEDIC SURGERY | Facility: CLINIC | Age: 70
End: 2020-01-06

## 2020-01-06 DIAGNOSIS — S82.831S OTHER CLOSED FRACTURE OF DISTAL END OF RIGHT FIBULA, SEQUELA: Primary | ICD-10-CM

## 2020-01-06 NOTE — TELEPHONE ENCOUNTER
I called her and she re injured it on 12/6/19 came and saw us on 12/9/19 and nothing was found on xray. She went back to her routine after a few days and has tried air cast and boot but it is not helping she is not making progress. She is wanting to know if you can order an MRI to see if something else is going on. Please advise. Thanks!  Pratibha

## 2020-01-06 NOTE — TELEPHONE ENCOUNTER
PT CALLED THIS MORNING IN REGARDS TO HER RIGHT ANKLE. SHE IS HAVING EXTREME PAIN AGAIN AND IS UNSURE IF THERE IS SOMETHING ELSE GOING ON. SHE WOULD LIKE SOMEONE TO CALL HER BACK [phone].

## 2020-01-06 NOTE — TELEPHONE ENCOUNTER
Marleny,    Can you please call and let this patient know that Tracee put an order in for an MRI and schedule her for a follow up after MRI. Thanks!

## 2020-01-13 ENCOUNTER — HOSPITAL ENCOUNTER (OUTPATIENT)
Dept: MRI IMAGING | Facility: HOSPITAL | Age: 70
Discharge: HOME | End: 2020-01-13
Admitting: PHYSICIAN ASSISTANT

## 2020-01-13 DIAGNOSIS — S82.831S OTHER CLOSED FRACTURE OF DISTAL END OF RIGHT FIBULA, SEQUELA: ICD-10-CM

## 2020-01-13 PROCEDURE — 73721 MRI JNT OF LWR EXTRE W/O DYE: CPT

## 2020-01-16 ENCOUNTER — OFFICE VISIT (OUTPATIENT)
Dept: FAMILY MEDICINE CLINIC | Facility: CLINIC | Age: 70
End: 2020-01-16

## 2020-01-16 VITALS
SYSTOLIC BLOOD PRESSURE: 126 MMHG | BODY MASS INDEX: 24.49 KG/M2 | OXYGEN SATURATION: 99 % | DIASTOLIC BLOOD PRESSURE: 82 MMHG | HEART RATE: 99 BPM | HEIGHT: 68 IN | WEIGHT: 161.6 LBS

## 2020-01-16 DIAGNOSIS — Z51.81 ENCOUNTER FOR THERAPEUTIC DRUG MONITORING: ICD-10-CM

## 2020-01-16 DIAGNOSIS — B37.2 SKIN YEAST INFECTION: ICD-10-CM

## 2020-01-16 DIAGNOSIS — F51.01 PRIMARY INSOMNIA: Primary | ICD-10-CM

## 2020-01-16 PROCEDURE — 99214 OFFICE O/P EST MOD 30 MIN: CPT | Performed by: NURSE PRACTITIONER

## 2020-01-16 RX ORDER — ZOLPIDEM TARTRATE 10 MG/1
10 TABLET ORAL NIGHTLY PRN
Qty: 30 TABLET | Refills: 2 | Status: SHIPPED | OUTPATIENT
Start: 2020-01-16 | End: 2020-06-04 | Stop reason: SDUPTHER

## 2020-01-16 RX ORDER — NYSTATIN 100000 U/G
OINTMENT TOPICAL 2 TIMES DAILY
Qty: 30 G | Refills: 0 | Status: SHIPPED | OUTPATIENT
Start: 2020-01-16 | End: 2020-01-26

## 2020-01-16 NOTE — PATIENT INSTRUCTIONS
Insomnia  Insomnia is a sleep disorder that makes it difficult to fall asleep or stay asleep. Insomnia can cause fatigue, low energy, difficulty concentrating, mood swings, and poor performance at work or school.  There are three different ways to classify insomnia:  · Difficulty falling asleep.  · Difficulty staying asleep.  · Waking up too early in the morning.  Any type of insomnia can be long-term (chronic) or short-term (acute). Both are common. Short-term insomnia usually lasts for three months or less. Chronic insomnia occurs at least three times a week for longer than three months.  What are the causes?  Insomnia may be caused by another condition, situation, or substance, such as:  · Anxiety.  · Certain medicines.  · Gastroesophageal reflux disease (GERD) or other gastrointestinal conditions.  · Asthma or other breathing conditions.  · Restless legs syndrome, sleep apnea, or other sleep disorders.  · Chronic pain.  · Menopause.  · Stroke.  · Abuse of alcohol, tobacco, or illegal drugs.  · Mental health conditions, such as depression.  · Caffeine.  · Neurological disorders, such as Alzheimer's disease.  · An overactive thyroid (hyperthyroidism).  Sometimes, the cause of insomnia may not be known.  What increases the risk?  Risk factors for insomnia include:  · Gender. Women are affected more often than men.  · Age. Insomnia is more common as you get older.  · Stress.  · Lack of exercise.  · Irregular work schedule or working night shifts.  · Traveling between different time zones.  · Certain medical and mental health conditions.  What are the signs or symptoms?  If you have insomnia, the main symptom is having trouble falling asleep or having trouble staying asleep. This may lead to other symptoms, such as:  · Feeling fatigued or having low energy.  · Feeling nervous about going to sleep.  · Not feeling rested in the morning.  · Having trouble concentrating.  · Feeling irritable, anxious, or depressed.  How  is this diagnosed?  This condition may be diagnosed based on:  · Your symptoms and medical history. Your health care provider may ask about:  ? Your sleep habits.  ? Any medical conditions you have.  ? Your mental health.  · A physical exam.  How is this treated?  Treatment for insomnia depends on the cause. Treatment may focus on treating an underlying condition that is causing insomnia. Treatment may also include:  · Medicines to help you sleep.  · Counseling or therapy.  · Lifestyle adjustments to help you sleep better.  Follow these instructions at home:  Eating and drinking    · Limit or avoid alcohol, caffeinated beverages, and cigarettes, especially close to bedtime. These can disrupt your sleep.  · Do not eat a large meal or eat spicy foods right before bedtime. This can lead to digestive discomfort that can make it hard for you to sleep.  Sleep habits    · Keep a sleep diary to help you and your health care provider figure out what could be causing your insomnia. Write down:  ? When you sleep.  ? When you wake up during the night.  ? How well you sleep.  ? How rested you feel the next day.  ? Any side effects of medicines you are taking.  ? What you eat and drink.  · Make your bedroom a dark, comfortable place where it is easy to fall asleep.  ? Put up shades or blackout curtains to block light from outside.  ? Use a white noise machine to block noise.  ? Keep the temperature cool.  · Limit screen use before bedtime. This includes:  ? Watching TV.  ? Using your smartphone, tablet, or computer.  · Stick to a routine that includes going to bed and waking up at the same times every day and night. This can help you fall asleep faster. Consider making a quiet activity, such as reading, part of your nighttime routine.  · Try to avoid taking naps during the day so that you sleep better at night.  · Get out of bed if you are still awake after 15 minutes of trying to sleep. Keep the lights down, but try reading or  doing a quiet activity. When you feel sleepy, go back to bed.  General instructions  · Take over-the-counter and prescription medicines only as told by your health care provider.  · Exercise regularly, as told by your health care provider. Avoid exercise starting several hours before bedtime.  · Use relaxation techniques to manage stress. Ask your health care provider to suggest some techniques that may work well for you. These may include:  ? Breathing exercises.  ? Routines to release muscle tension.  ? Visualizing peaceful scenes.  · Make sure that you drive carefully. Avoid driving if you feel very sleepy.  · Keep all follow-up visits as told by your health care provider. This is important.  Contact a health care provider if:  · You are tired throughout the day.  · You have trouble in your daily routine due to sleepiness.  · You continue to have sleep problems, or your sleep problems get worse.  Get help right away if:  · You have serious thoughts about hurting yourself or someone else.  If you ever feel like you may hurt yourself or others, or have thoughts about taking your own life, get help right away. You can go to your nearest emergency department or call:  · Your local emergency services (911 in the [place]  · A suicide crisis helpline, such as the National Suicide Prevention Lifeline at 1-323.506.5182. This is open 24 hours a day.  Summary  · Insomnia is a sleep disorder that makes it difficult to fall asleep or stay asleep.  · Insomnia can be long-term (chronic) or short-term (acute).  · Treatment for insomnia insomnia.  This information is not intended to replace advice given to you by your health care provider. Make sure you discuss any questions you have with your health care provider.  Document Released: 12/15/2001 Document Revised: 09/27/2018 Document Reviewed: 09/27/2018  [employer] Interactive Patient Education © 2019 [employer]    Skin Yeast Infection    A skin yeast infection is a condition in  which there is an overgrowth of yeast (candida) that normally lives on the skin. This condition usually occurs in areas of the skin that are constantly warm and moist, such as the armpits or the groin.  What are the causes?  This condition is caused by a change in the normal balance of the yeast and bacteria that live on the skin.  What increases the risk?  You are more likely to develop this condition if you:  · Are obese.  · Are pregnant.  · Take birth control pills.  · Have diabetes.  · Take antibiotic medicines.  · Take steroid medicines.  · Are malnourished.  · Have a weak body defense system (immune system).  · Are 65 years of age or older.  · Wear tight clothing.  What are the signs or symptoms?  The most common symptom of this condition is itchiness in the affected area. Other symptoms include:  · Red, swollen area of the skin.  · Bumps on the skin.  How is this diagnosed?  · This condition is diagnosed with a medical history and physical exam.  · Your health care provider may check for yeast by taking light scrapings of the skin to be viewed under a microscope.  How is this treated?  This condition is treated with medicine. Medicines may be prescribed or be available over the counter. The medicines may be:  · Taken by mouth (orally).  · Applied as a cream or powder to your skin.  Follow these instructions at home:    · Take or apply over-the-counter and prescription medicines only as told by your health care provider.  · Maintain a healthy weight. If you need help losing weight, talk with your health care provider.  · Keep your skin clean and dry.  · If you have diabetes, keep your blood sugar under control.  · Keep all follow-up visits as told by your health care provider. This is important.  Contact a health care provider if:  · Your symptoms go away and then return.  · Your symptoms do not get better with treatment.  · Your symptoms get worse.  · Your rash spreads.  · You have a fever or chills.  · You  "have new symptoms.  · You have new warmth or redness of your skin.  Summary  · A skin yeast infection is a condition in which there is an overgrowth of yeast (candida) that normally lives on the skin. This condition is caused by a change in the normal balance of the yeast and bacteria that live on the skin.  · Take or apply over-the-counter and prescription medicines only as told by your health care provider.  · Keep your skin clean and dry.  · Contact a health care provider if your symptoms do not get better with treatment.  This information is not intended to replace advice given to you by your health care provider. Make sure you discuss any questions you have with your health care provider.  Document Released: 09/04/2012 Document Revised: 05/07/2019 Document Reviewed: 05/07/2019  [employer] Interactive Patient Education © 2019 [employer]    Managing Your Hypertension  Hypertension is commonly called high blood pressure. This is when the force of your blood pressing against the walls of your arteries is too strong. Arteries are blood vessels that carry blood from your heart throughout your body. Hypertension forces the heart to work harder to pump blood, and may cause the arteries to become narrow or stiff. Having untreated or uncontrolled hypertension can cause heart attack, stroke, kidney disease, and other problems.  What are blood pressure readings?  A blood pressure reading consists of a higher number over a lower number. Ideally, your blood pressure should be below 120/80. The first (\"top\") number is called the systolic pressure. It is a measure of the pressure in your arteries as your heart beats. The second (\"bottom\") number is called the diastolic pressure. It is a measure of the pressure in your arteries as the heart relaxes.  What does my blood pressure reading mean?  Blood pressure is classified into four stages. Based on your blood pressure reading, your health care provider may use the following " stages to determine what type of treatment you need, if any. Systolic pressure and diastolic pressure are measured in a unit called mm Hg.  Normal  · Systolic pressure: below 120.  · Diastolic pressure: below 80.  Elevated  · Systolic pressure: 120-129.  · Diastolic pressure: below 80.  Hypertension stage 1  · Systolic pressure: 130-139.  · Diastolic pressure: 80-89.  Hypertension stage 2  · Systolic pressure: 140 or above.  · Diastolic pressure: 90 or above.  What health risks are associated with hypertension?  Managing your hypertension is an important responsibility. Uncontrolled hypertension can lead to:  · A heart attack.  · A stroke.  · A weakened blood vessel (aneurysm).  · Heart failure.  · Kidney damage.  · Eye damage.  · Metabolic syndrome.  · Memory and concentration problems.  What changes can I make to manage my hypertension?  Hypertension can be managed by making lifestyle changes and possibly by taking medicines. Your health care provider will help you make a plan to bring your blood pressure within a normal range.  Eating and drinking    · Eat a diet that is high in fiber and potassium, and low in salt (sodium), added sugar, and fat. An example eating plan is called the DASH (Dietary Approaches to Stop Hypertension) diet. To eat this way:  ? Eat plenty of fresh fruits and vegetables. Try to fill half of your plate at each meal with fruits and vegetables.  ? Eat whole grains, such as whole wheat pasta, brown rice, or whole grain bread. Fill about one quarter of your plate with whole grains.  ? Eat low-fat diary products.  ? Avoid fatty cuts of meat, processed or cured meats, and poultry with skin. Fill about one quarter of your plate with lean proteins such as fish, chicken without skin, beans, eggs, and tofu.  ? Avoid premade and processed foods. These tend to be higher in sodium, added sugar, and fat.  · Reduce your daily sodium intake. Most people with hypertension should eat less than 1,500 mg of  sodium a day.  · Limit alcohol intake to no more than 1 drink a day for nonpregnant women and 2 drinks a day for men. One drink equals 12 oz of beer, 5 oz of wine, or 1½ oz of hard liquor.  Lifestyle  · Work with your health care provider to maintain a healthy body weight, or to lose weight. Ask what an ideal weight is for you.  · Get at least 30 minutes of exercise that causes your heart to beat faster (aerobic exercise) most days of the week. Activities may include walking, swimming, or biking.  · Include exercise to strengthen your muscles (resistance exercise), such as weight lifting, as part of your weekly exercise routine. Try to do these types of exercises for 30 minutes at least 3 days a week.  · Do not use any products that contain nicotine or tobacco, such as cigarettes and e-cigarettes. If you need help quitting, ask your health care provider.  · Control any long-term (chronic) conditions you have, such as high cholesterol or diabetes.  Monitoring  · Monitor your blood pressure at home as told by your health care provider. Your personal target blood pressure may vary depending on your medical conditions, your age, and other factors.  · Have your blood pressure checked regularly, as often as told by your health care provider.  Working with your health care provider  · Review all the medicines you take with your health care provider because there may be side effects or interactions.  · Talk with your health care provider about your diet, exercise habits, and other lifestyle factors that may be contributing to hypertension.  · Visit your health care provider regularly. Your health care provider can help you create and adjust your plan for managing hypertension.  Will I need medicine to control my blood pressure?  Your health care provider may prescribe medicine if lifestyle changes are not enough to get your blood pressure under control, and if:  · Your systolic blood pressure is 130 or higher.  · Your  diastolic blood pressure is 80 or higher.  Take medicines only as told by your health care provider. Follow the directions carefully. Blood pressure medicines must be taken as prescribed. The medicine does not work as well when you skip doses. Skipping doses also puts you at risk for problems.  Contact a health care provider if:  · You think you are having a reaction to medicines you have taken.  · You have repeated (recurrent) headaches.  · You feel dizzy.  · You have swelling in your ankles.  · You have trouble with your vision.  Get help right away if:  · You develop a severe headache or confusion.  · You have unusual weakness or numbness, or you feel faint.  · You have severe pain in your chest or abdomen.  · You vomit repeatedly.  · You have trouble breathing.  Summary  · Hypertension is when the force of blood pumping through your arteries is too strong. If this condition is not controlled, it may put you at risk for serious complications.  · Your personal target blood pressure may vary depending on your medical conditions, your age, and other factors. For most people, a normal blood pressure is less than 120/80.  · Hypertension is managed by lifestyle changes, medicines, or both. Lifestyle changes include weight loss, eating a healthy, low-sodium diet, exercising more, and limiting alcohol.  This information is not intended to replace advice given to you by your health care provider. Make sure you discuss any questions you have with your health care provider.  Document Released: 09/11/2013 Document Revised: 11/15/2017 Document Reviewed: 11/15/2017  [employer] Interactive Patient Education © 2019 [employer]

## 2020-01-16 NOTE — PROGRESS NOTES
Subjective   Dixie Cervantes is a 69 y.o. female.   Chief Complaint   Patient presents with   • insomia f/u       History of Present Illness   Patient is here for follow up for insomnia, has been on Ambien for years, started by Dr. Duggan, patient has not been able to wean off, denies unusual behaviors or grogginess next day, unable to sleep without the Ambien.  Also has concerns about blood pressure, running high at home, brought cuff in today, is reading 30 points office than manual cuff. Had a headache for a couple of days.   New complaint of Rash present x 3 weeks under breasts, has used antibiotic ointment and hydrocortisone, no significant relief.  The following portions of the patient's history were reviewed and updated as appropriate: allergies, current medications and problem list.    Review of Systems   Constitutional: Negative for chills, fatigue and fever.   Respiratory: Negative for shortness of breath and wheezing.    Cardiovascular: Negative for chest pain, palpitations and leg swelling.   Gastrointestinal: Negative for diarrhea and nausea.   Genitourinary: Negative for difficulty urinating and dysuria.   Neurological: Positive for headaches.   Psychiatric/Behavioral: Positive for sleep disturbance. The patient is nervous/anxious.        Objective   Physical Exam   Constitutional: She is oriented to person, place, and time. She appears well-developed and well-nourished. No distress.   HENT:   Head: Normocephalic and atraumatic.   Left Ear: External ear normal.   Eyes: Conjunctivae are normal. No scleral icterus.   Cardiovascular: Normal rate, regular rhythm and normal heart sounds.   Pulmonary/Chest: Effort normal and breath sounds normal. No respiratory distress. She has no wheezes. She has no rales.   Abdominal: Soft.   Neurological: She is alert and oriented to person, place, and time.   Skin: Rash (under both breasts, skin intact, slight erythema) noted. She is not diaphoretic.   Psychiatric: Her  behavior is normal. Judgment and thought content normal. Her mood appears anxious. Cognition and memory are normal.   Vitals reviewed.      Assessment/Plan   Dixie was seen today for insomia f/u.    Diagnoses and all orders for this visit:    Primary insomnia  -     zolpidem (AMBIEN) 10 MG tablet; Take 1 tablet by mouth At Night As Needed for Sleep. Do not take with alprazolam    Skin yeast infection  -     nystatin (MYCOSTATIN) 344724 UNIT/GM ointment; Apply  topically to the appropriate area as directed 2 (Two) Times a Day for 10 days.    Insomnia, unspecified type    Encounter for therapeutic drug monitoring  -     Urine Drug Screen - Urine, Clean Catch    Insomnia is controlled, continue ambien  This patient is on a controlled substance which improves symptoms/quality of life and is aware of the risks, benefits and possible side-effects current treatment. The patient denies any medication side-effects at this time. A controlled substance agreement will be obtained or is currently on file. We reviewed required monitoring for controlled substances including but not limited to quarterly follow-up visits, annual depression screening, and urine drug screens to which the patient is agreeable. A MADY report has been or shortly will be reviewed. There are no signs of deviation or misuse.   UDS collected today, MADY reviewed  Nystatin prescribed for skin yeast, advised keeping area dry, wear cotton clothing, absorbent powder  Blood pressure normal in office, her cuff is reading systolic about 30 points higher, recommend new monitor.  Patient was encouraged to keep me informed of any acute changes, lack of improvement, or any new concerning symptoms.

## 2020-01-17 ENCOUNTER — OFFICE VISIT (OUTPATIENT)
Dept: ORTHOPEDIC SURGERY | Facility: CLINIC | Age: 70
End: 2020-01-17

## 2020-01-17 VITALS — OXYGEN SATURATION: 99 % | WEIGHT: 161.6 LBS | BODY MASS INDEX: 24.49 KG/M2 | HEIGHT: 68 IN | HEART RATE: 119 BPM

## 2020-01-17 DIAGNOSIS — M25.571 CHRONIC PAIN OF RIGHT ANKLE: Primary | ICD-10-CM

## 2020-01-17 DIAGNOSIS — G89.29 CHRONIC PAIN OF RIGHT ANKLE: Primary | ICD-10-CM

## 2020-01-17 DIAGNOSIS — S82.831G OTHER CLOSED FRACTURE OF DISTAL END OF RIGHT FIBULA WITH DELAYED HEALING, SUBSEQUENT ENCOUNTER: ICD-10-CM

## 2020-01-17 PROCEDURE — 99213 OFFICE O/P EST LOW 20 MIN: CPT | Performed by: PHYSICIAN ASSISTANT

## 2020-01-17 NOTE — PROGRESS NOTES
[facility]        Subjective     CC: Follow-up of the Right Ankle (After MRI 01/13/2020)      RIKI Cervantes is a 69 y.o. female.  Patient returns for MRI follow-up of her right ankle.  I had last seen her 12/9/2019 for increased pain, swelling and bruising to the lateral aspect of the ankle.  She had been transitioned out of the Aircast into the lace up brace by PT when the flareup began.  Prior to that and she was wearing an Aircast and doing well.  She had a return to me wearing her boot and using the Rollator to assist with ambulation.  During that appointment she was encouraged that as pain improved she could transition from boot to Aircast and then slowly trans-back to using lace up ankle brace while continue to work with PT.  Patient attempted this but noted increasing pain and contacted me in early January at which time an MRI was ordered.    Patient reports that pain has progressively worsened.  Any type of weightbearing causes an exacerbation of her pain.  Currently she endorses a pain scale of 4-6/10.  Severity the pain moderate.  Pain is worse with any type of walking, standing after prolonged sitting, stair climbing it does affect her sleep.  She is using salonpas for pain control.  I had her discontinue use of anti-inflammatories because of their interference with fracture healing.  All pain is localized to the distal fibula and over anterior lateral aspect ankle.  No numbness or tingling into the extremity.    At this time, patient is very frustrated that this issue has been ongoing for over 5 months and now her persistent pain and dysfunction to the right ankle.        ROS:    Constiutional:Pt denies fever, chills, nausea, or vomiting.  MSK:as above        Objective      Past Medical History  Past Medical History:   Diagnosis Date   • Chronic cough     thought to be cough variant asthma   • Chronic low back pain    • Cornea abrasion 2019   • Diverticulosis    •  "Esophageal spasm    • External otitis     Recurring   • OCTAVIO (generalized anxiety disorder)    • GERD (gastroesophageal reflux disease)    • History of bladder infections    • Insomnia    • Inverted nipple     Left   • Irritable bowel syndrome (IBS)    • Parasitic infection     History of multiple infections   • Urinary, incontinence, stress female    • Vestibular vertigo          Physical Exam  Pulse 119   Ht 172.7 cm (67.99\")   Wt 73.3 kg (161 lb 9.6 oz)   SpO2 99%   BMI 24.58 kg/m²     Body mass index is 24.58 kg/m².    Patient is well nourished and well developed.        Ortho Exam  Right ankle  Skin: Grossly intact without any redness or warmth.  Continued soft tissue swelling simon-lateral ankle, in area of ATFL.  No ecchymosis or bruising today. Baby powder between toes.  Tenderness: Positive to distal fibula and increased pain over ATFL.  Mild discomfort along peroneal tendons as they travel around distal fibula.  No Achilles or medial ankle tenderness.  Motion: Dorsiflexion/plantarflexion remain grossly intact however with eversion and inversion she does have increased pain.  Instability: Anterior drawer, forced external rotation and talar tilt negative for laxity.  Motor/sensory: Grossly intact L2-S1.  Heel and toes walking not assessed.      Imaging/Labs/EMG Reviewed:  No new imaging today.    Dr. Salmeron and I reviewed patient's MRI performed on 1/13/2020.  Imaging also reviewed by Dr. Johnson.  EXAMINATION: MRI ANKLE RIGHT WO CONTRAST-     INDICATION: Fracture, ankle; S82.831S-Other fracture of upper and lower  end of right fibula, sequela.      TECHNIQUE: Multiplanar MRI of the right ankle without intravenous  contrast.     COMPARISON: Radiographs dated 12/09/2019.     FINDINGS: Osseous structures demonstrate low T1 signal through the  distal right fibula as seen on recent radiographic evaluation with poor  if any callus formation bridging this area demonstrating minimal edema  in the distal " fibular margin. No acute fracture is evident, however,  chronic fracture deformity with minimal healing medially noted in a  cleft of fluid signal laterally identified.     Achilles tendon intact and unremarkable. Extensor tendons within normal  limits. Mild irregularity of the ATFL represent prior injury without  discontinuity or significant edema associated for acute findings. Flexor  tendons unremarkable. Talar dome preserved. Ankle mortise symmetric with  no syndesmotic widening. Mild increased signal within the intermalleolar  ligament that may represent low-grade injury versus strain.     IMPRESSION:  Limited healing of a chronic distal fibular fracture at the  metadiaphyseal level with open margins laterally concerning for callus  formation, however no syndesmotic widening or ankle mortise asymmetry.  Increased signal with minimal edema adjacent to the lateral portion  intermalleolar ligament may represent low-grade injury or strain. ATFL  intact, however, minimal edema adjacent and fluid may represent prior  healed injury.      D:  01/13/2020  E:  01/13/2020     This report was finalized on 1/13/2020 12:22 PM by Dr. Naseem Juárez.       Assessment:  1. Chronic pain of right ankle    2. Other closed fracture of distal end of right fibula with delayed healing, subsequent encounter        Plan:  1. Right distal fibular fracture, delayed union.  2. Recommend continue with tall boot.  3. Ordered bone stimulator to help assist with healing.  4. May continue with weight bearing as tolerated.  Can not use knee scooter or crutches--feels unstable with them and scooter causing pain to left knee.  Currently using rollator and resting right knee/lower leg on seat portion.  5. Follow-up in 2 months for repeat evaluation, sooner if issues arise or symptoms worsen/change.  Will need repeat imaging right ankle at this visit.  Depending on exam and imaging at that visit, if pain remains order CT.  Discussed that may need  screw placed for fixation, for painful nonunion.  6. Patient does have an appointment scheduled for Monday with Dr. Stewart for second opinion.  Recommend patient keep this appointment.  7. Questions and concerns answered.     Case discussed with Yesenia Salmeron and Elizabeth, both agree with the above assessment and plan.      Tracee Santiago PA-C  01/17/20  12:29 PM

## 2020-01-20 ENCOUNTER — TELEPHONE (OUTPATIENT)
Dept: ORTHOPEDIC SURGERY | Facility: CLINIC | Age: 70
End: 2020-01-20

## 2020-01-20 NOTE — TELEPHONE ENCOUNTER
The patient wanted to let Tracee know that she is going to have surgery with Dr. Stewart instead of doing the bone stimulator. I advised I spoke with our brace rosie Vergara and he mentioned he would let Lianna who handles the bone stimulator request know that she can cancel it. She understood.    Faith

## 2020-01-20 NOTE — TELEPHONE ENCOUNTER
WANTS TO CANCEL THE BONE STIMULATOR.  SHE DOESN'T WANT TO DO THAT ANYMORE.  SHE IS GOING TO DO SURGERY WITH ANOTHER PROVIDER DR PURVIS FROM [facility].  SHE WOULD LIKE MARIA L RAMIREZ TO CALL HER BACK SO THAT SHE CAN SPEAK TO HER IN REGARDS TO HER NEW PLANS. WANTS TO MAKE SURE HER INSURANCE IS NOT CHARGED FOR THE BONE STIMULATOR

## 2020-05-18 ENCOUNTER — TELEPHONE (OUTPATIENT)
Dept: FAMILY MEDICINE CLINIC | Facility: CLINIC | Age: 70
End: 2020-05-18

## 2020-05-18 NOTE — TELEPHONE ENCOUNTER
Pt would like to know if she should come into upcoming appt she would prefer not to come in and she doesn't have the devices for the vitual visits please call and advise at     [phone]

## 2020-05-18 NOTE — TELEPHONE ENCOUNTER
If visit is for controlled substances, ambien, xanax, she will need to be a video or in office visit, since last visit was in Jan.

## 2020-05-19 NOTE — TELEPHONE ENCOUNTER
Pt Contacted, Message given. Pt is just going to keep her June  appt for her Medicare annual wellness

## 2020-06-04 ENCOUNTER — LAB (OUTPATIENT)
Dept: LAB | Facility: HOSPITAL | Age: 70
End: 2020-06-04

## 2020-06-04 ENCOUNTER — OFFICE VISIT (OUTPATIENT)
Dept: FAMILY MEDICINE CLINIC | Facility: CLINIC | Age: 70
End: 2020-06-04

## 2020-06-04 VITALS
DIASTOLIC BLOOD PRESSURE: 76 MMHG | BODY MASS INDEX: 23.95 KG/M2 | HEIGHT: 68 IN | OXYGEN SATURATION: 98 % | WEIGHT: 158 LBS | HEART RATE: 95 BPM | SYSTOLIC BLOOD PRESSURE: 120 MMHG

## 2020-06-04 DIAGNOSIS — E55.9 VITAMIN D DEFICIENCY: ICD-10-CM

## 2020-06-04 DIAGNOSIS — E83.52 SERUM CALCIUM ELEVATED: ICD-10-CM

## 2020-06-04 DIAGNOSIS — Z13.0 SCREENING FOR DEFICIENCY ANEMIA: ICD-10-CM

## 2020-06-04 DIAGNOSIS — Z13.29 THYROID DISORDER SCREENING: ICD-10-CM

## 2020-06-04 DIAGNOSIS — Z13.220 LIPID SCREENING: ICD-10-CM

## 2020-06-04 DIAGNOSIS — R73.03 PREDIABETES: ICD-10-CM

## 2020-06-04 DIAGNOSIS — F41.9 ANXIETY: ICD-10-CM

## 2020-06-04 DIAGNOSIS — F51.01 PRIMARY INSOMNIA: ICD-10-CM

## 2020-06-04 DIAGNOSIS — K21.9 GASTROESOPHAGEAL REFLUX DISEASE WITHOUT ESOPHAGITIS: ICD-10-CM

## 2020-06-04 DIAGNOSIS — Z00.00 MEDICARE ANNUAL WELLNESS VISIT, SUBSEQUENT: Primary | ICD-10-CM

## 2020-06-04 LAB
ALBUMIN SERPL-MCNC: 4.8 G/DL (ref 3.5–5.2)
ALBUMIN/GLOB SERPL: 2.3 G/DL
ALP SERPL-CCNC: 68 U/L (ref 39–117)
ALT SERPL W P-5'-P-CCNC: 14 U/L (ref 1–33)
ANION GAP SERPL CALCULATED.3IONS-SCNC: 12 MMOL/L (ref 5–15)
AST SERPL-CCNC: 15 U/L (ref 1–32)
BASOPHILS # BLD AUTO: 0.03 10*3/MM3 (ref 0–0.2)
BASOPHILS NFR BLD AUTO: 0.3 % (ref 0–1.5)
BILIRUB SERPL-MCNC: 0.6 MG/DL (ref 0.2–1.2)
BUN BLD-MCNC: 12 MG/DL (ref 8–23)
BUN/CREAT SERPL: 16 (ref 7–25)
CALCIUM SPEC-SCNC: 9.1 MG/DL (ref 8.6–10.5)
CHLORIDE SERPL-SCNC: 104 MMOL/L (ref 98–107)
CHOLEST SERPL-MCNC: 191 MG/DL (ref 0–200)
CO2 SERPL-SCNC: 24 MMOL/L (ref 22–29)
CREAT BLD-MCNC: 0.75 MG/DL (ref 0.57–1)
DEPRECATED RDW RBC AUTO: 40.1 FL (ref 37–54)
EOSINOPHIL # BLD AUTO: 0.06 10*3/MM3 (ref 0–0.4)
EOSINOPHIL NFR BLD AUTO: 0.7 % (ref 0.3–6.2)
ERYTHROCYTE [DISTWIDTH] IN BLOOD BY AUTOMATED COUNT: 12.3 % (ref 12.3–15.4)
GFR SERPL CREATININE-BSD FRML MDRD: 76 ML/MIN/1.73
GLOBULIN UR ELPH-MCNC: 2.1 GM/DL
GLUCOSE BLD-MCNC: 112 MG/DL (ref 65–99)
HBA1C MFR BLD: 5.98 % (ref 4.8–5.6)
HCT VFR BLD AUTO: 43.3 % (ref 34–46.6)
HDLC SERPL-MCNC: 55 MG/DL (ref 40–60)
HGB BLD-MCNC: 14.6 G/DL (ref 12–15.9)
IMM GRANULOCYTES # BLD AUTO: 0.03 10*3/MM3 (ref 0–0.05)
IMM GRANULOCYTES NFR BLD AUTO: 0.3 % (ref 0–0.5)
LDLC SERPL CALC-MCNC: 110 MG/DL (ref 0–100)
LDLC/HDLC SERPL: 1.99 {RATIO}
LYMPHOCYTES # BLD AUTO: 2.18 10*3/MM3 (ref 0.7–3.1)
LYMPHOCYTES NFR BLD AUTO: 25 % (ref 19.6–45.3)
MCH RBC QN AUTO: 30 PG (ref 26.6–33)
MCHC RBC AUTO-ENTMCNC: 33.7 G/DL (ref 31.5–35.7)
MCV RBC AUTO: 89.1 FL (ref 79–97)
MONOCYTES # BLD AUTO: 0.7 10*3/MM3 (ref 0.1–0.9)
MONOCYTES NFR BLD AUTO: 8 % (ref 5–12)
NEUTROPHILS # BLD AUTO: 5.71 10*3/MM3 (ref 1.7–7)
NEUTROPHILS NFR BLD AUTO: 65.7 % (ref 42.7–76)
NRBC BLD AUTO-RTO: 0 /100 WBC (ref 0–0.2)
PLATELET # BLD AUTO: 275 10*3/MM3 (ref 140–450)
PMV BLD AUTO: 10.4 FL (ref 6–12)
POTASSIUM BLD-SCNC: 3.9 MMOL/L (ref 3.5–5.2)
PROT SERPL-MCNC: 6.9 G/DL (ref 6–8.5)
RBC # BLD AUTO: 4.86 10*6/MM3 (ref 3.77–5.28)
SODIUM BLD-SCNC: 140 MMOL/L (ref 136–145)
TRIGL SERPL-MCNC: 132 MG/DL (ref 0–150)
TSH SERPL DL<=0.05 MIU/L-ACNC: 2.1 UIU/ML (ref 0.27–4.2)
VLDLC SERPL-MCNC: 26.4 MG/DL (ref 5–40)
WBC NRBC COR # BLD: 8.71 10*3/MM3 (ref 3.4–10.8)

## 2020-06-04 PROCEDURE — G0439 PPPS, SUBSEQ VISIT: HCPCS | Performed by: NURSE PRACTITIONER

## 2020-06-04 PROCEDURE — 84443 ASSAY THYROID STIM HORMONE: CPT

## 2020-06-04 PROCEDURE — 82306 VITAMIN D 25 HYDROXY: CPT

## 2020-06-04 PROCEDURE — 83036 HEMOGLOBIN GLYCOSYLATED A1C: CPT

## 2020-06-04 PROCEDURE — 83970 ASSAY OF PARATHORMONE: CPT

## 2020-06-04 PROCEDURE — 80061 LIPID PANEL: CPT

## 2020-06-04 PROCEDURE — 80053 COMPREHEN METABOLIC PANEL: CPT

## 2020-06-04 PROCEDURE — 85025 COMPLETE CBC W/AUTO DIFF WBC: CPT

## 2020-06-04 PROCEDURE — 82330 ASSAY OF CALCIUM: CPT

## 2020-06-04 RX ORDER — ZOLPIDEM TARTRATE 10 MG/1
10 TABLET ORAL NIGHTLY PRN
Qty: 30 TABLET | Refills: 2 | Status: SHIPPED | OUTPATIENT
Start: 2020-06-04 | End: 2020-09-08 | Stop reason: SDUPTHER

## 2020-06-04 RX ORDER — OMEPRAZOLE 20 MG/1
20 CAPSULE, DELAYED RELEASE ORAL DAILY
Qty: 90 CAPSULE | Refills: 1 | Status: SHIPPED | OUTPATIENT
Start: 2020-06-04 | End: 2020-09-08

## 2020-06-04 RX ORDER — ALPRAZOLAM 0.25 MG/1
0.12 TABLET ORAL DAILY PRN
Qty: 15 TABLET | Refills: 0 | Status: SHIPPED | OUTPATIENT
Start: 2020-06-04 | End: 2020-09-08 | Stop reason: SDUPTHER

## 2020-06-04 RX ORDER — OMEPRAZOLE 20 MG/1
20 CAPSULE, DELAYED RELEASE ORAL DAILY
COMMUNITY
End: 2020-06-04 | Stop reason: SDUPTHER

## 2020-06-04 RX ORDER — ALENDRONATE SODIUM 70 MG/1
TABLET ORAL
COMMUNITY
Start: 2020-03-02 | End: 2021-12-14

## 2020-06-04 NOTE — PATIENT INSTRUCTIONS
Food Choices for Gastroesophageal Reflux Disease, Adult  When you have gastroesophageal reflux disease (GERD), the foods you eat and your eating habits are very important. Choosing the right foods can help ease your discomfort. Think about working with a nutrition specialist (dietitian) to help you make good choices.  What are tips for following this plan?    Meals  · Choose healthy foods that are low in fat, such as fruits, vegetables, whole grains, low-fat dairy products, and lean meat, fish, and poultry.  · Eat small meals often instead of 3 large meals a day. Eat your meals slowly, and in a place where you are relaxed. Avoid bending over or lying down until 2-3 hours after eating.  · Avoid eating meals 2-3 hours before bed.  · Avoid drinking a lot of liquid with meals.  · Cook foods using methods other than frying. Bake, grill, or broil food instead.  · Avoid or limit:  ? Chocolate.  ? Peppermint or spearmint.  ? Alcohol.  ? Pepper.  ? Black and decaffeinated coffee.  ? Black and decaffeinated tea.  ? Bubbly (carbonated) soft drinks.  ? Caffeinated energy drinks and soft drinks.  · Limit high-fat foods such as:  ? Fatty meat or fried foods.  ? Whole milk, cream, butter, or ice cream.  ? Nuts and nut butters.  ? Pastries, donuts, and sweets made with butter or shortening.  · Avoid foods that cause symptoms. These foods may be different for everyone. Common foods that cause symptoms include:  ? Tomatoes.  ? Oranges, domitila, and limes.  ? Peppers.  ? Spicy food.  ? Onions and garlic.  ? Vinegar.  Lifestyle  · Maintain a healthy weight. Ask your doctor what weight is healthy for you. If you need to lose weight, work with your doctor to do so safely.  · Exercise for at least 30 minutes for 5 or more days each week, or as told by your doctor.  · Wear loose-fitting clothes.  · Do not smoke. If you need help quitting, ask your doctor.  · Sleep with the head of your bed higher than your feet. Use a wedge under the  mattress or blocks under the bed frame to raise the head of the bed.  Summary  · When you have gastroesophageal reflux disease (GERD), food and lifestyle choices are very important in easing your symptoms.  · Eat small meals often instead of 3 large meals a day. Eat your meals slowly, and in a place where you are relaxed.  · Limit high-fat foods such as fatty meat or fried foods.  · Avoid bending over or lying down until 2-3 hours after eating.  · Avoid peppermint and spearmint, caffeine, alcohol, and chocolate.  This information is not intended to replace advice given to you by your health care provider. Make sure you discuss any questions you have with your health care provider.  Document Released: 06/18/2013 Document Revised: 04/09/2020 Document Reviewed: 01/23/2018  Elsevier Patient Education © 2020 [employer]    Health Maintenance After Age 65  After age 65, you are at a higher risk for certain long-term diseases and infections as well as injuries from falls. Falls are a major cause of broken bones and head injuries in people who are older than age 65. Getting regular preventive care can help to keep you healthy and well. Preventive care includes getting regular testing and making lifestyle changes as recommended by your health care provider. Talk with your health care provider about:  · Which screenings and tests you should have. A screening is a test that checks for a disease when you have no symptoms.  · A diet and exercise plan that is right for you.  What should I know about screenings and tests to prevent falls?  Screening and testing are the best ways to find a health problem early. Early diagnosis and treatment give you the best chance of managing medical conditions that are common after age 65. Certain conditions and lifestyle choices may make you more likely to have a fall. Your health care provider may recommend:  · Regular vision checks. Poor vision and conditions such as cataracts can make you  more likely to have a fall. If you wear glasses, make sure to get your prescription updated if your vision changes.  · Medicine review. Work with your health care provider to regularly review all of the medicines you are taking, including over-the-counter medicines. Ask your health care provider about any side effects that may make you more likely to have a fall. Tell your health care provider if any medicines that you take make you feel dizzy or sleepy.  · Osteoporosis screening. Osteoporosis is a condition that causes the bones to get weaker. This can make the bones weak and cause them to break more easily.  · Blood pressure screening. Blood pressure changes and medicines to control blood pressure can make you feel dizzy.  · Strength and balance checks. Your health care provider may recommend certain tests to check your strength and balance while standing, walking, or changing positions.  · Foot health exam. Foot pain and numbness, as well as not wearing proper footwear, can make you more likely to have a fall.  · Depression screening. You may be more likely to have a fall if you have a fear of falling, feel emotionally low, or feel unable to do activities that you used to do.  · Alcohol use screening. Using too much alcohol can affect your balance and may make you more likely to have a fall.  What actions can I take to lower my risk of falls?  General instructions  · Talk with your health care provider about your risks for falling. Tell your health care provider if:  ? You fall. Be sure to tell your health care provider about all falls, even ones that seem minor.  ? You feel dizzy, sleepy, or off-balance.  · Take over-the-counter and prescription medicines only as told by your health care provider. These include any supplements.  · Eat a healthy diet and maintain a healthy weight. A healthy diet includes low-fat dairy products, low-fat (lean) meats, and fiber from whole grains, beans, and lots of fruits and  vegetables.  Home safety  · Remove any tripping hazards, such as rugs, cords, and clutter.  · Install safety equipment such as grab bars in bathrooms and safety rails on stairs.  · Keep rooms and walkways well-lit.  Activity    · Follow a regular exercise program to stay fit. This will help you maintain your balance. Ask your health care provider what types of exercise are appropriate for you.  · If you need a cane or walker, use it as recommended by your health care provider.  · Wear supportive shoes that have nonskid soles.  Lifestyle  · Do not drink alcohol if your health care provider tells you not to drink.  · If you drink alcohol, limit how much you have:  ? 0-1 drink a day for women.  ? 0-2 drinks a day for men.  · Be aware of how much alcohol is in your drink. In the [place] one drink equals one typical bottle of beer (12 oz), one-half glass of wine (5 oz), or one shot of hard liquor (1½ oz).  · Do not use any products that contain nicotine or tobacco, such as cigarettes and e-cigarettes. If you need help quitting, ask your health care provider.  Summary  · Having a healthy lifestyle and getting preventive care can help to protect your health and wellness after age 65.  · Screening and testing are the best way to find a health problem early and help you avoid having a fall. Early diagnosis and treatment give you the best chance for managing medical conditions that are more common for people who are older than age 65.  · Falls are a major cause of broken bones and head injuries in people who are older than age 65. Take precautions to prevent a fall at home.  · Work with your health care provider to learn what changes you can make to improve your health and wellness and to prevent falls.  This information is not intended to replace advice given to you by your health care provider. Make sure you discuss any questions you have with your health care provider.  Document Released: 10/31/2018 Document Revised:  04/09/2020 Document Reviewed: 10/31/2018  Elsevier Patient Education © 2020 [employer]    Bone Health  Bones protect organs, store calcium, anchor muscles, and support the whole body. Keeping your bones strong is important, especially as you get older. You can take actions to help keep your bones strong and healthy.  Why is keeping my bones healthy important?    Keeping your bones healthy is important because your body constantly replaces bone cells. Cells get old, and new cells take their place. As we age, we lose bone cells because the body may not be able to make enough new cells to replace the old cells. The amount of bone cells and bone tissue you have is referred to as bone mass. The higher your bone mass, the stronger your bones.  The aging process leads to an overall loss of bone mass in the body, which can increase the likelihood of:  · Joint pain and stiffness.  · Broken bones.  · A condition in which the bones become weak and brittle (osteoporosis).  A large decline in bone mass occurs in older adults. In women, it occurs about the time of menopause.  What actions can I take to keep my bones healthy?  Good health habits are important for maintaining healthy bones. This includes eating nutritious foods and exercising regularly. To have healthy bones, you need to get enough of the right minerals and vitamins. Most nutrition experts recommend getting these nutrients from the foods that you eat. In some cases, taking supplements may also be recommended. Doing certain types of exercise is also important for bone health.  What are the nutritional recommendations for healthy bones?    Eating a well-balanced diet with plenty of calcium and vitamin D will help to protect your bones. Nutritional recommendations vary from person to person. Ask your health care provider what is healthy for you. Here are some general guidelines.  Get enough calcium  Calcium is the most important (essential) mineral for bone health.  Most people can get enough calcium from their diet, but supplements may be recommended for people who are at risk for osteoporosis. Good sources of calcium include:  · Dairy products, such as low-fat or nonfat milk, cheese, and yogurt.  · Dark green leafy vegetables, such as bok [place]son and broccoli.  · Calcium-fortified foods, such as orange juice, cereal, bread, soy beverages, and tofu products.  · Nuts, such as almonds.  Follow these recommended amounts for daily calcium intake:  · Children, age 1-3: 700 mg.  · Children, age 4-8: 1,000 mg.  · Children, age 9-13: 1,300 mg.  · Teens, age 14-18: 1,300 mg.  · Adults, age 19-50: 1,000 mg.  · Adults, age 51-70:  ? Men: 1,000 mg.  ? Women: 1,200 mg.  · Adults, age 71 or older: 1,200 mg.  · Pregnant and breastfeeding females:  ? Teens: 1,300 mg.  ? Adults: 1,000 mg.  Get enough vitamin D  Vitamin D is the most essential vitamin for bone health. It helps the body absorb calcium. Sunlight stimulates the skin to make vitamin D, so be sure to get enough sunlight. If you live in a cold climate or you do not get outside often, your health care provider may recommend that you take vitamin D supplements. Good sources of vitamin D in your diet include:  · Egg yolks.  · Saltwater fish.  · Milk and cereal fortified with vitamin D.  Follow these recommended amounts for daily vitamin D intake:  · Children and teens, age 1-18: 600 international units.  · Adults, age 50 or younger: 400-800 international units.  · Adults, age 51 or older: 800-1,000 international units.  Get other important nutrients  Other nutrients that are important for bone health include:  · Phosphorus. This mineral is found in meat, poultry, dairy foods, nuts, and legumes. The recommended daily intake for adult men and adult women is 700 mg.  · Magnesium. This mineral is found in seeds, nuts, dark green vegetables, and legumes. The recommended daily intake for adult men is 400-420 mg. For adult women, it is 310-320  mg.  · Vitamin K. This vitamin is found in green leafy vegetables. The recommended daily intake is 120 mg for adult men and 90 mg for adult women.  What type of physical activity is best for building and maintaining healthy bones?  Weight-bearing and strength-building activities are important for building and maintaining healthy bones. Weight-bearing activities cause muscles and bones to work against gravity. Strength-building activities increase the strength of the muscles that support bones. Weight-bearing and muscle-building activities include:  · Walking and hiking.  · Jogging and running.  · Dancing.  · Gym exercises.  · Lifting weights.  · Tennis and racquetball.  · Climbing stairs.  · Aerobics.  Adults should get at least 30 minutes of moderate physical activity on most days. Children should get at least 60 minutes of moderate physical activity on most days. Ask your health care provider what type of exercise is best for you.  How can I find out if my bone mass is low?  Bone mass can be measured with an X-ray test called a bone mineral density (BMD) test. This test is recommended for all women who are age 65 or older. It may also be recommended for:  · Men who are age 70 or older.  · People who are at risk for osteoporosis because of:  ? Having bones that break easily.  ? Having a long-term disease that weakens bones, such as kidney disease or rheumatoid arthritis.  ? Having menopause earlier than normal.  ? Taking medicine that weakens bones, such as steroids, thyroid hormones, or hormone treatment for breast cancer or prostate cancer.  ? Smoking.  ? Drinking three or more alcoholic drinks a day.  If you find that you have a low bone mass, you may be able to prevent osteoporosis or further bone loss by changing your diet and lifestyle.  Where can I find more information?  For more information, check out the following websites:  · National Osteoporosis Foundation: www.nof.org/patients  · National Institutes  of Health: www.bones.nih.gov  · International Osteoporosis Foundation: www.iofbonehealth.org  Summary  · The aging process leads to an overall loss of bone mass in the body, which can increase the likelihood of broken bones and osteoporosis.  · Eating a well-balanced diet with plenty of calcium and vitamin D will help to protect your bones.  · Weight-bearing and strength-building activities are also important for building and maintaining strong bones.  · Bone mass can be measured with an X-ray test called a bone mineral density (BMD) test.  This information is not intended to replace advice given to you by your health care provider. Make sure you discuss any questions you have with your health care provider.  Document Released: 03/09/2005 Document Revised: 01/14/2019 Document Reviewed: 01/14/2019  [employer] Patient Education © 2020 [employer]  Denosumab injection  What is this medicine?  DENOSUMAB (den oh danny mab) slows bone breakdown. Prolia is used to treat osteoporosis in women after menopause and in men, and in people who are taking corticosteroids for 6 months or more. Xgeva is used to treat a high calcium level due to cancer and to prevent bone fractures and other bone problems caused by multiple myeloma or cancer bone metastases. Xgeva is also used to treat giant cell tumor of the bone.  This medicine may be used for other purposes; ask your health care provider or pharmacist if you have questions.  COMMON BRAND NAME(S): Prolia, XGEVA  What should I tell my health care provider before I take this medicine?  They need to know if you have any of these conditions:  · dental disease  · having surgery or tooth extraction  · infection  · kidney disease  · low levels of calcium or Vitamin D in the blood  · malnutrition  · on hemodialysis  · skin conditions or sensitivity  · thyroid or parathyroid disease  · an unusual reaction to denosumab, other medicines, foods, dyes, or preservatives  · pregnant or trying to  get pregnant  · breast-feeding  How should I use this medicine?  This medicine is for injection under the skin. It is given by a health care professional in a hospital or clinic setting.  A special MedGuide will be given to you before each treatment. Be sure to read this information carefully each time.  For Prolia, talk to your pediatrician regarding the use of this medicine in children. Special care may be needed. For Xgeva, talk to your pediatrician regarding the use of this medicine in children. While this drug may be prescribed for children as young as 13 years for selected conditions, precautions do apply.  Overdosage: If you think you have taken too much of this medicine contact a poison control center or emergency room at once.  NOTE: This medicine is only for you. Do not share this medicine with others.  What if I miss a dose?  It is important not to miss your dose. Call your doctor or health care professional if you are unable to keep an appointment.  What may interact with this medicine?  Do not take this medicine with any of the following medications:  · other medicines containing denosumab  This medicine may also interact with the following medications:  · medicines that lower your chance of fighting infection  · steroid medicines like prednisone or cortisone  This list may not describe all possible interactions. Give your health care provider a list of all the medicines, herbs, non-prescription drugs, or dietary supplements you use. Also tell them if you smoke, drink alcohol, or use illegal drugs. Some items may interact with your medicine.  What should I watch for while using this medicine?  Visit your doctor or health care professional for regular checks on your progress. Your doctor or health care professional may order blood tests and other tests to see how you are doing.  Call your doctor or health care professional for advice if you get a fever, chills or sore throat, or other symptoms of a cold  or flu. Do not treat yourself. This drug may decrease your body's ability to fight infection. Try to avoid being around people who are sick.  You should make sure you get enough calcium and vitamin D while you are taking this medicine, unless your doctor tells you not to. Discuss the foods you eat and the vitamins you take with your health care professional.  See your dentist regularly. Brush and floss your teeth as directed. Before you have any dental work done, tell your dentist you are receiving this medicine.  Do not become pregnant while taking this medicine or for 5 months after stopping it. Talk with your doctor or health care professional about your birth control options while taking this medicine. Women should inform their doctor if they wish to become pregnant or think they might be pregnant. There is a potential for serious side effects to an unborn child. Talk to your health care professional or pharmacist for more information.  What side effects may I notice from receiving this medicine?  Side effects that you should report to your doctor or health care professional as soon as possible:  · allergic reactions like skin rash, itching or hives, swelling of the face, lips, or tongue  · bone pain  · breathing problems  · dizziness  · jaw pain, especially after dental work  · redness, blistering, peeling of the skin  · signs and symptoms of infection like fever or chills; cough; sore throat; pain or trouble passing urine  · signs of low calcium like fast heartbeat, muscle cramps or muscle pain; pain, tingling, numbness in the hands or feet; seizures  · unusual bleeding or bruising  · unusually weak or tired  Side effects that usually do not require medical attention (report to your doctor or health care professional if they continue or are bothersome):  · constipation  · diarrhea  · headache  · joint pain  · loss of appetite  · muscle pain  · runny nose  · tiredness  · upset stomach  This list may not  describe all possible side effects. Call your doctor for medical advice about side effects. You may report side effects to FDA at 5-246-QTS-3164.  Where should I keep my medicine?  This medicine is only given in a clinic, doctor's office, or other health care setting and will not be stored at home.  NOTE: This sheet is a summary. It may not cover all possible information. If you have questions about this medicine, talk to your doctor, pharmacist, or health care provider.  © 2020 [employer] (2019-04-26 16:10:44)

## 2020-06-04 NOTE — PROGRESS NOTES
The ABCs of the Annual Wellness Visit  Subsequent Medicare Wellness Visit    Chief Complaint   Patient presents with   • Medicare Wellness-subsequent     Passed clock test       Subjective   History of Present Illness:  Dixie Cervantes is a 69 y.o. female who presents for a Subsequent Medicare Wellness Visit. Is exercising more, eating healthy diet overall.  Continues to take Ambien, has had increased anxiety during the pandemic, is very concerned about being out in public, she and  are avoiding others, including family. States she has already made their  arrangements in case they do get COVID. Would like to get a refill of xanax as well.  Twitching in eye occ, ocular migraines, go away after a few minutes.  Right ear discomfort still, ear drops have helped.      HEALTH RISK ASSESSMENT    Recent Hospitalizations:  No hospitalization(s) within the last year.    Current Medical Providers:  Patient Care Team:  Linda Adhikari APRN as PCP - General (Nurse Practitioner)  Camden Franco MD (Neurosurgery)  Jason Pino MD as Consulting Physician (Pulmonary Disease)  Fady Lane MD as Consulting Physician (Otolaryngology)    Smoking Status:  Social History     Tobacco Use   Smoking Status Never Smoker   Smokeless Tobacco Never Used       Alcohol Consumption:  Social History     Substance and Sexual Activity   Alcohol Use Yes    Comment: 2-3 drinks a month       Depression Screen:   PHQ-2/PHQ-9 Depression Screening 2020   Little interest or pleasure in doing things 0   Feeling down, depressed, or hopeless 1   Total Score 1       Fall Risk Screen:  STEADI Fall Risk Assessment was completed, and patient is at HIGH risk for falls. Assessment completed on:2020    Health Habits and Functional and Cognitive Screening:  Functional & Cognitive Status 2020   Do you have difficulty preparing food and eating? No   Do you have difficulty bathing yourself, getting dressed or  grooming yourself? No   Do you have difficulty using the toilet? No   Do you have difficulty moving around from place to place? No   Do you have trouble with steps or getting out of a bed or a chair? No   Current Diet Well Balanced Diet   Dental Exam Not up to date   Eye Exam Up to date   Exercise (times per week) 5 times per week   Current Exercise Activities Include Swimming   Do you need help using the phone?  No   Are you deaf or do you have serious difficulty hearing?  No   Do you need help with transportation? Yes   Do you need help shopping? Yes   Do you need help preparing meals?  No   Do you need help with housework?  Yes   Do you need help with laundry? No   Do you need help taking your medications? No   Do you need help managing money? No   Do you ever drive or ride in a car without wearing a seat belt? No   Have you felt unusual stress, anger or loneliness in the last month? Yes   Who do you live with? Spouse   If you need help, do you have trouble finding someone available to you? No   Have you been bothered in the last four weeks by sexual problems? No   Do you have difficulty concentrating, remembering or making decisions? Yes         Does the patient have evidence of cognitive impairment? No    Asprin use counseling:Does not need ASA (and currently is not on it)    Age-appropriate Screening Schedule:  Refer to the list below for future screening recommendations based on patient's age, sex and/or medical conditions. Orders for these recommended tests are listed in the plan section. The patient has been provided with a written plan.    Health Maintenance   Topic Date Due   • ZOSTER VACCINE (2 of 3) 02/26/2014   • DXA SCAN  10/18/2019   • MAMMOGRAM  03/29/2020   • LIPID PANEL  04/10/2020   • INFLUENZA VACCINE  08/01/2020   • TDAP/TD VACCINES (2 - Td) 01/01/2024   • COLONOSCOPY  10/05/2028          The following portions of the patient's history were reviewed and updated as appropriate: allergies,  current medications, past family history, past medical history, past social history, past surgical history and problem list.    Outpatient Medications Prior to Visit   Medication Sig Dispense Refill   • alendronate (FOSAMAX) 70 MG tablet      • Multiple Vitamins-Minerals (MULTI VITAMIN/MINERALS) tablet Take 1 tablet by mouth.     • omeprazole (priLOSEC) 20 MG capsule Take 20 mg by mouth Daily.     • zolpidem (AMBIEN) 10 MG tablet Take 1 tablet by mouth At Night As Needed for Sleep. Do not take with alprazolam 30 tablet 2   • ALPRAZolam (XANAX) 0.25 MG tablet Take 0.5 tablets by mouth Daily As Needed for Anxiety. 15 tablet 0   • cyclobenzaprine (FLEXERIL) 10 MG tablet Take 10 mg by mouth As Needed for Muscle Spasms.     • diclofenac (VOLTAREN) 1 % gel gel Apply 4 g topically to the appropriate area as directed 4 (Four) Times a Day As Needed.     • traMADol (ULTRAM) 50 MG tablet Take 50 mg by mouth As Needed for Moderate Pain .     • neomycin-colistin-hydrocortisone-thonzonium (CORTISPORIN TC) 3.3-3-10-0.5 MG/ML otic suspension Administer 3 drops to the right ear 4 (Four) Times a Day. 10 mL 1     No facility-administered medications prior to visit.        Patient Active Problem List   Diagnosis   • Vestibular vertigo   • Urinary, incontinence, stress female   • Parasitic infection   • Irritable bowel syndrome (IBS)   • Inverted nipple   • Insomnia   • History of bladder infections   • GERD (gastroesophageal reflux disease)   • OCTAVIO (generalized anxiety disorder)   • External otitis   • Esophageal spasm   • Diverticulosis   • Chronic low back pain   • Chronic cough       Advanced Care Planning:  ACP discussion was held with the patient during this visit. Patient has an advance directive in EMR which is still valid.     Review of Systems   Constitutional: Positive for activity change (r/t right ankle fracture). Negative for appetite change, chills, diaphoresis, fatigue, fever and unexpected weight change.   HENT:  "Positive for ear pain (right) and postnasal drip. Negative for congestion, hearing loss and trouble swallowing.    Eyes: Negative for photophobia, pain, discharge, redness, itching and visual disturbance.   Respiratory: Positive for cough (occ). Negative for shortness of breath and wheezing.    Cardiovascular: Negative for chest pain, palpitations and leg swelling.   Gastrointestinal: Positive for abdominal pain (when first started fosamax). Negative for abdominal distention, blood in stool, constipation, diarrhea (loose stools with meds) and nausea.   Endocrine: Negative for polydipsia and polyuria.   Genitourinary: Positive for frequency. Negative for difficulty urinating, dysuria and vaginal bleeding.   Musculoskeletal: Positive for arthralgias, back pain and gait problem.   Skin: Negative for color change, rash and wound.   Allergic/Immunologic: Positive for environmental allergies.   Neurological: Positive for numbness (left finger) and headaches (ocular migraines x 5-6 years). Negative for dizziness and light-headedness.   Psychiatric/Behavioral: Positive for dysphoric mood (situational) and sleep disturbance. Negative for self-injury and suicidal ideas. The patient is nervous/anxious.        Compared to one year ago, the patient feels her physical health is better.  Compared to one year ago, the patient feels her mental health is the same.    Reviewed chart for potential of high risk medication in the elderly: yes  Reviewed chart for potential of harmful drug interactions in the elderly:yes    Objective         Vitals:    06/04/20 1015   BP: 120/76   BP Location: Right arm   Patient Position: Sitting   Cuff Size: Adult   Pulse: 95   SpO2: 98%   Weight: 71.7 kg (158 lb)   Height: 172.7 cm (67.99\")   PainSc:   4   PainLoc: Foot  Comment: Right foot pain       Body mass index is 24.03 kg/m².  Discussed the patient's BMI with her. The BMI is in the acceptable range.    Physical Exam   Constitutional: She is " oriented to person, place, and time. She appears well-developed and well-nourished. No distress.   HENT:   Head: Normocephalic and atraumatic.   Right Ear: External ear normal.   Left Ear: External ear normal.   Nose: Nose normal.   Mouth/Throat: Oropharynx is clear and moist.   Eyes: Pupils are equal, round, and reactive to light. Conjunctivae and EOM are normal. Right eye exhibits no discharge. Left eye exhibits no discharge. No scleral icterus.   Neck: Normal range of motion. Neck supple. No JVD present. No tracheal deviation present. No thyromegaly present.   Cardiovascular: Normal rate, regular rhythm, normal heart sounds and intact distal pulses. Exam reveals no gallop and no friction rub.   No murmur heard.  Pulmonary/Chest: Effort normal and breath sounds normal. No stridor. No respiratory distress. She has no wheezes. She has no rales. She exhibits no tenderness. Right breast exhibits no inverted nipple, no mass, no nipple discharge, no skin change and no tenderness. Left breast exhibits no inverted nipple, no mass, no nipple discharge, no skin change and no tenderness. Breasts are symmetrical.   Abdominal: Soft. Normal appearance and bowel sounds are normal. She exhibits no distension and no mass. There is no hepatosplenomegaly. There is no tenderness. There is no rebound and no guarding. No hernia.   Musculoskeletal: Normal range of motion. She exhibits no edema, tenderness or deformity.   Lymphadenopathy:     She has no cervical adenopathy.   Neurological: She is alert and oriented to person, place, and time. She has normal reflexes. She displays normal reflexes. No cranial nerve deficit. She exhibits normal muscle tone. Coordination normal.   Skin: Skin is warm and dry. No rash noted. She is not diaphoretic. No erythema. No pallor.   Psychiatric: Her behavior is normal. Judgment and thought content normal. Her mood appears anxious. Her speech is rapid and/or pressured.   Nursing note and vitals  reviewed.            Assessment/Plan   Medicare Risks and Personalized Health Plan  CMS Preventative Services Quick Reference  Advance Directive Discussion  Breast Cancer/Mammogram Screening  Depression/Dysphoria  Diabetic Lab Screening   Osteoprorosis Risk  Polypharmacy  anxiety    The above risks/problems have been discussed with the patient.  Pertinent information has been shared with the patient in the After Visit Summary.  Follow up plans and orders are seen below in the Assessment/Plan Section.    Diagnoses and all orders for this visit:    1. Medicare annual wellness visit, subsequent (Primary)    2. Gastroesophageal reflux disease without esophagitis  -     omeprazole (priLOSEC) 20 MG capsule; Take 1 capsule by mouth Daily.  Dispense: 90 capsule; Refill: 1    3. Primary insomnia  -     zolpidem (AMBIEN) 10 MG tablet; Take 1 tablet by mouth At Night As Needed for Sleep. Do not take with alprazolam  Dispense: 30 tablet; Refill: 2    4. Anxiety  -     ALPRAZolam (XANAX) 0.25 MG tablet; Take 0.5 tablets by mouth Daily As Needed for Anxiety.  Dispense: 15 tablet; Refill: 0    5. Serum calcium elevated  -     Comprehensive Metabolic Panel; Future  -     PTH, Intact & Calcium; Future  -     Calcium, Ionized; Future    6. Vitamin D deficiency  -     Vitamin D 25 Hydroxy; Future    7. Screening for deficiency anemia  -     CBC Auto Differential; Future    8. Lipid screening  -     Lipid Panel; Future    9. Thyroid disorder screening  -     TSH Rfx On Abnormal To Free T4; Future    10. Prediabetes  -     Hemoglobin A1c; Future      GERD is controlled, continue current medication  This patient is on a controlled substance which improves symptoms/quality of life and is aware of the risks, benefits and possible side-effects current treatment. The patient denies any medication side-effects at this time. A controlled substance agreement will be obtained or is currently on file. We reviewed required monitoring for  controlled substances including but not limited to quarterly follow-up visits, annual depression screening, and urine drug screens to which the patient is agreeable. A MADY report has been or shortly will be reviewed. There are no signs of deviation or misuse.   Continue Ambien for insomnia  Anxiety is not controlled, xanax refilled, patient understands that needs to be used sparingly, patient has tried therapy, declines  Screening labs ordered to evaluate chronic conditions. I will contact patient regarding test results and provide instructions regarding any necessary changes in plan of care.   Reduce fall risk with regular vision checks, medication review, osteoporosis screening, strength and balance training. Remove tripping hazards, avoid standing too quickly, limit alcohol. Eat a healthy well balanced diet, stay hydrated.  Nutrition and activity goals reviewed including: mainly water to drink, limit white flour/processed sugar, and processed foods, choose fresh fruits, vegetables, fish, lean meats,high fiber carbs, exercise  working toward 150 mins cardio per week, weight training 2x/week.        Follow Up:  Return in about 3 months (around 9/4/2020) for Recheck schedule as a telephone visit.     An After Visit Summary and PPPS were given to the patient.         Fax results to Dr. Chin, in care team

## 2020-06-05 LAB
25(OH)D3 SERPL-MCNC: 50.8 NG/ML (ref 30–100)
CA-I BLD-MCNC: 5 MG/DL (ref 4.6–5.4)
CA-I SERPL ISE-MCNC: 1.26 MMOL/L (ref 1.15–1.35)
CALCIUM SPEC-SCNC: 9.4 MG/DL (ref 8.6–10.5)
PTH-INTACT SERPL-MCNC: 85.4 PG/ML (ref 15–65)

## 2020-09-08 ENCOUNTER — OFFICE VISIT (OUTPATIENT)
Dept: FAMILY MEDICINE CLINIC | Facility: CLINIC | Age: 70
End: 2020-09-08

## 2020-09-08 DIAGNOSIS — B37.2 SKIN YEAST INFECTION: Primary | ICD-10-CM

## 2020-09-08 DIAGNOSIS — F41.9 ANXIETY: ICD-10-CM

## 2020-09-08 DIAGNOSIS — F51.01 PRIMARY INSOMNIA: ICD-10-CM

## 2020-09-08 PROCEDURE — 99442 PR PHYS/QHP TELEPHONE EVALUATION 11-20 MIN: CPT | Performed by: NURSE PRACTITIONER

## 2020-09-08 RX ORDER — NYSTATIN 100000 U/G
CREAM TOPICAL AS NEEDED
Qty: 30 G | Refills: 2 | Status: SHIPPED | OUTPATIENT
Start: 2020-09-08 | End: 2021-12-14 | Stop reason: SDUPTHER

## 2020-09-08 RX ORDER — ZOLPIDEM TARTRATE 10 MG/1
10 TABLET ORAL NIGHTLY PRN
Qty: 30 TABLET | Refills: 2 | Status: SHIPPED | OUTPATIENT
Start: 2020-09-08 | End: 2020-12-21 | Stop reason: SDUPTHER

## 2020-09-08 RX ORDER — ALPRAZOLAM 0.25 MG/1
0.12 TABLET ORAL DAILY PRN
Qty: 15 TABLET | Refills: 0 | Status: SHIPPED | OUTPATIENT
Start: 2020-09-08 | End: 2020-12-21 | Stop reason: SDUPTHER

## 2020-09-08 NOTE — PROGRESS NOTES
Subjective   Dixie Cervantes is a 69 y.o. female.   Chief Complaint   Patient presents with   • Insomnia   You have chosen to receive care through a telephone visit. Do you consent to use a telephone visit for your medical care today? Yes    History of Present Illness   Patient follow up visit for insomnia, has been staying in because of covid, increased anxiety, uses alprazolam sparingly, takes ambien for sleep. Denies unusual behaviors, her son will be released from [facility] Friday, he does not live with patient and does not have access to meds.   Was on antibiotics for a root canal, developed a rash under her breasts, tried monistat, helped some, would like nystatin cream  The following portions of the patient's history were reviewed and updated as appropriate: allergies, current medications and problem list.    Review of Systems   Constitutional: Negative for diaphoresis, fatigue and fever.   Respiratory: Negative for shortness of breath and wheezing.    Cardiovascular: Negative for chest pain and palpitations.   Skin: Positive for rash (under breasts).   Neurological: Positive for light-headedness. Negative for dizziness and headaches.   Psychiatric/Behavioral: Positive for sleep disturbance. Negative for self-injury and suicidal ideas. The patient is nervous/anxious.        Objective   Physical Exam  There were no vitals taken for this visit.    Assessment/Plan   Dixie was seen today for insomnia.    Diagnoses and all orders for this visit:    Skin yeast infection  -     nystatin (MYCOSTATIN) 654743 UNIT/GM cream; Apply  topically to the appropriate area as directed As Needed (yeast rash).    Primary insomnia  -     zolpidem (AMBIEN) 10 MG tablet; Take 1 tablet by mouth At Night As Needed for Sleep. Do not take with alprazolam    Anxiety  -     ALPRAZolam (XANAX) 0.25 MG tablet; Take 0.5 tablets by mouth Daily As Needed for Anxiety.        This visit has been rescheduled as a phone visit to comply with patient  safety concerns in accordance with CDC recommendations. Total time of discussion was 12 minutes.    This patient is on a controlled substance which improves symptoms/quality of life and is aware of the risks, benefits and possible side-effects current treatment. The patient denies any medication side-effects at this time. A controlled substance agreement will be obtained or is currently on file. We reviewed required monitoring for controlled substances including but not limited to quarterly follow-up visits, annual depression screening, and urine drug screens to which the patient is agreeable. A MADY report has been or shortly will be reviewed. There are no signs of deviation or misuse.   Insomnia is controlled with ambien, anxiety is not controlled, refilled alprazolam, last fill was in June for 15 tablets.  Risks, benefits, and potential side effects of current/new medications reviewed with patient.  Patient voiced understanding and wished to proceed with treatment.  Nystatin for skin yeast  Patient was encouraged to keep me informed of any acute changes, lack of improvement, or any new concerning symptoms.

## 2020-12-21 ENCOUNTER — TELEMEDICINE (OUTPATIENT)
Dept: FAMILY MEDICINE CLINIC | Facility: CLINIC | Age: 70
End: 2020-12-21

## 2020-12-21 DIAGNOSIS — K08.89 TOOTHACHE: Primary | ICD-10-CM

## 2020-12-21 DIAGNOSIS — F41.9 ANXIETY: ICD-10-CM

## 2020-12-21 DIAGNOSIS — F51.01 PRIMARY INSOMNIA: ICD-10-CM

## 2020-12-21 PROCEDURE — 99442 PR PHYS/QHP TELEPHONE EVALUATION 11-20 MIN: CPT | Performed by: NURSE PRACTITIONER

## 2020-12-21 RX ORDER — AMOXICILLIN 500 MG/1
500 CAPSULE ORAL 2 TIMES DAILY
Qty: 20 CAPSULE | Refills: 0 | Status: SHIPPED | OUTPATIENT
Start: 2020-12-21 | End: 2020-12-31

## 2020-12-21 RX ORDER — ZOLPIDEM TARTRATE 10 MG/1
10 TABLET ORAL NIGHTLY PRN
Qty: 30 TABLET | Refills: 2 | Status: SHIPPED | OUTPATIENT
Start: 2020-12-21 | End: 2021-03-15 | Stop reason: SDUPTHER

## 2020-12-21 RX ORDER — ALPRAZOLAM 0.25 MG/1
0.12 TABLET ORAL DAILY PRN
Qty: 15 TABLET | Refills: 0 | Status: SHIPPED | OUTPATIENT
Start: 2020-12-21 | End: 2022-08-04

## 2020-12-21 NOTE — PROGRESS NOTES
Subjective   Dixie Cervantes is a 70 y.o. female.   You have chosen to receive care through a telephone visit. Do you consent to use a telephone visit for your medical care today? Yes  Chief Complaint   Patient presents with   • Insomnia       History of Present Illness   Patient telephone visit follow up for insomnia, has been on this for several years, unable to wean off or decrease dose. Patient has significant anxiety, has been taking alprazolam prn for anxiety. Her  Ángel was in the ICU recently with obstruction and infection from a kidney stone. States they were afraid for him to go to the hospital because of COVID, but his symptoms became severe. She took a xanax everyday while he was in the hospital because she was going in to sit with him for several hours a day.   Also has complaint of a Painful tooth, had root canal not long ago, unable to drive because of fracture in foot, is fearful to get out because of COVID so hasn't called the dentist back. Requests an antibiotic    The following portions of the patient's history were reviewed and updated as appropriate: allergies, current medications, past family history, past medical history, past social history, past surgical history and problem list.    Review of Systems    Objective   Physical Exam  There were no vitals taken for this visit.    Assessment/Plan   Diagnoses and all orders for this visit:    1. Toothache (Primary)  -     amoxicillin (AMOXIL) 500 MG capsule; Take 1 capsule by mouth 2 (Two) Times a Day for 10 days.  Dispense: 20 capsule; Refill: 0    2. Anxiety  -     ALPRAZolam (XANAX) 0.25 MG tablet; Take 0.5 tablets by mouth Daily As Needed for Anxiety.  Dispense: 15 tablet; Refill: 0    3. Primary insomnia  -     zolpidem (AMBIEN) 10 MG tablet; Take 1 tablet by mouth At Night As Needed for Sleep. Do not take with alprazolam  Dispense: 30 tablet; Refill: 2      Amoxicillin prescribed for toothache, discussed the importance of following up with  her dentist, do not ignore worsening symptoms. Patient verbalized understanding  This patient is on a controlled substance which improves symptoms/quality of life and is aware of the risks, benefits and possible side-effects current treatment. The patient denies any medication side-effects at this time. A controlled substance agreement will be obtained or is currently on file. We reviewed required monitoring for controlled substances including but not limited to quarterly follow-up visits, annual depression screening, and urine drug screens to which the patient is agreeable. A MADY report has been or shortly will be reviewed. There are no signs of deviation or misuse.   Continue ambien for insomnia, no adverse behaviors  Refilled alprazolam for anxiety  Patient was encouraged to keep me informed of any acute changes, lack of improvement, or any new concerning symptoms.  This visit has been rescheduled as a phone visit to comply with patient safety concerns in accordance with CDC recommendations. Total time of discussion was 16 minutes.

## 2021-03-15 ENCOUNTER — TELEMEDICINE (OUTPATIENT)
Dept: FAMILY MEDICINE CLINIC | Facility: CLINIC | Age: 71
End: 2021-03-15

## 2021-03-15 DIAGNOSIS — F51.01 PRIMARY INSOMNIA: ICD-10-CM

## 2021-03-15 PROCEDURE — 99442 PR PHYS/QHP TELEPHONE EVALUATION 11-20 MIN: CPT | Performed by: NURSE PRACTITIONER

## 2021-03-15 RX ORDER — ZOLPIDEM TARTRATE 10 MG/1
10 TABLET ORAL NIGHTLY PRN
Qty: 30 TABLET | Refills: 2 | Status: SHIPPED | OUTPATIENT
Start: 2021-03-15 | End: 2021-06-14 | Stop reason: SDUPTHER

## 2021-03-15 RX ORDER — SODIUM PHOSPHATE,MONO-DIBASIC 19G-7G/118
1 ENEMA (ML) RECTAL
COMMUNITY

## 2021-03-15 NOTE — PROGRESS NOTES
Chief Complaint  Insomnia and Anxiety    Subjective          Dixie Cervantes presents to [facility]  You have chosen to receive care through a telephone visit. Do you consent to use a telephone visit for your medical care today? Yes    Mammogram is scheduled  Anxiety has improved since she and  have received both covid vaccines.   has been on ambien for many years, started years ago by Dr Duggan, Denies unusual behaviors after taking the medication, but does not sleep without it.   She has xanax that she takes only as needed, does not need a refill at this time.         Insomnia    Anxiety  Symptoms include insomnia.         Current Outpatient Medications on File Prior to Visit   Medication Sig Dispense Refill   • alendronate (FOSAMAX) 70 MG tablet      • ALPRAZolam (XANAX) 0.25 MG tablet Take 0.5 tablets by mouth Daily As Needed for Anxiety. 15 tablet 0   • diclofenac (VOLTAREN) 1 % gel gel As Needed.     • glucosamine-chondroitin 500-400 MG capsule capsule Take 1 capsule by mouth 3 (Three) Times a Day With Meals.     • Lactobacillus (PROBIOTIC ACIDOPHILUS PO) Take 1 tablet by mouth Daily.     • Multiple Vitamins-Minerals (MULTI VITAMIN/MINERALS) tablet Take 1 tablet by mouth.     • nystatin (MYCOSTATIN) 751508 UNIT/GM cream Apply  topically to the appropriate area as directed As Needed (yeast rash). 30 g 2   • [DISCONTINUED] zolpidem (AMBIEN) 10 MG tablet Take 1 tablet by mouth At Night As Needed for Sleep. Do not take with alprazolam 30 tablet 2     No current facility-administered medications on file prior to visit.       Objective   Vital Signs:   There were no vitals taken for this visit.    Physical Exam   Result Review :          Assessment and Plan    Diagnoses and all orders for this visit:    1. Primary insomnia  -     zolpidem (AMBIEN) 10 MG tablet; Take 1 tablet by mouth At Night As Needed for Sleep. Do not take with alprazolam  Dispense: 30 tablet; Refill: 2      I  spent 10 minutes caring for Dixie on this date of service. This time includes time spent by me in the following activities:counseling and educating the patient/family/caregiver, ordering medications, tests, or procedures and documenting information in the medical record  Follow Up   Return in about 3 months (around 6/15/2021) for Medicare Wellness.  Patient was given instructions and counseling regarding her condition or for health maintenance advice. Please see specific information pulled into the AVS if appropriate.   This visit has been rescheduled as a phone visit to comply with patient safety concerns in accordance with CDC recommendations. Total time of discussion was 10 minutes.

## 2021-06-07 ENCOUNTER — TELEPHONE (OUTPATIENT)
Dept: FAMILY MEDICINE CLINIC | Facility: CLINIC | Age: 71
End: 2021-06-07

## 2021-06-07 DIAGNOSIS — R73.03 PREDIABETES: Primary | ICD-10-CM

## 2021-06-07 DIAGNOSIS — E55.9 VITAMIN D DEFICIENCY: ICD-10-CM

## 2021-06-07 DIAGNOSIS — Z13.0 SCREENING FOR DEFICIENCY ANEMIA: ICD-10-CM

## 2021-06-07 DIAGNOSIS — E78.00 ELEVATED LDL CHOLESTEROL LEVEL: ICD-10-CM

## 2021-06-07 DIAGNOSIS — N39.0 RECURRENT UTI: ICD-10-CM

## 2021-06-07 NOTE — TELEPHONE ENCOUNTER
Caller: Dixie Cervantes    Relationship: Self    Best call back number: [phone]    What orders are you requesting (i.e. lab or imaging): LABS    In what timeframe would the patient need to come in: BEFORE APPOINTMENT ON 6/14/21     Where will you receive your lab/imaging services: [facility]    Additional notes: PATIENT WOULD LIKE TO GET LABS DONE THIS WEEK IN THE MORNING. PLEASE CALL WHEN LABS ARE ORDERED

## 2021-06-09 ENCOUNTER — LAB (OUTPATIENT)
Dept: LAB | Facility: HOSPITAL | Age: 71
End: 2021-06-09

## 2021-06-09 DIAGNOSIS — R73.03 PREDIABETES: ICD-10-CM

## 2021-06-09 DIAGNOSIS — E78.00 ELEVATED LDL CHOLESTEROL LEVEL: ICD-10-CM

## 2021-06-09 DIAGNOSIS — N39.0 RECURRENT UTI: ICD-10-CM

## 2021-06-09 DIAGNOSIS — E55.9 VITAMIN D DEFICIENCY: ICD-10-CM

## 2021-06-09 DIAGNOSIS — Z13.0 SCREENING FOR DEFICIENCY ANEMIA: ICD-10-CM

## 2021-06-09 LAB
25(OH)D3 SERPL-MCNC: 45.8 NG/ML
ALBUMIN SERPL-MCNC: 4.7 G/DL (ref 3.5–5.2)
ALBUMIN/GLOB SERPL: 1.7 G/DL
ALP SERPL-CCNC: 59 U/L (ref 39–117)
ALT SERPL W P-5'-P-CCNC: 16 U/L (ref 1–33)
ANION GAP SERPL CALCULATED.3IONS-SCNC: 7.5 MMOL/L (ref 5–15)
AST SERPL-CCNC: 15 U/L (ref 1–32)
BASOPHILS # BLD AUTO: 0.04 10*3/MM3 (ref 0–0.2)
BASOPHILS NFR BLD AUTO: 0.5 % (ref 0–1.5)
BILIRUB SERPL-MCNC: 0.6 MG/DL (ref 0–1.2)
BILIRUB UR QL STRIP: NEGATIVE
BUN SERPL-MCNC: 11 MG/DL (ref 8–23)
BUN/CREAT SERPL: 10.5 (ref 7–25)
CALCIUM SPEC-SCNC: 9.8 MG/DL (ref 8.6–10.5)
CHLORIDE SERPL-SCNC: 104 MMOL/L (ref 98–107)
CHOLEST SERPL-MCNC: 192 MG/DL (ref 0–200)
CLARITY UR: ABNORMAL
CO2 SERPL-SCNC: 26.5 MMOL/L (ref 22–29)
COLOR UR: YELLOW
CREAT SERPL-MCNC: 1.05 MG/DL (ref 0.57–1)
DEPRECATED RDW RBC AUTO: 39.3 FL (ref 37–54)
EOSINOPHIL # BLD AUTO: 0.16 10*3/MM3 (ref 0–0.4)
EOSINOPHIL NFR BLD AUTO: 2.1 % (ref 0.3–6.2)
ERYTHROCYTE [DISTWIDTH] IN BLOOD BY AUTOMATED COUNT: 12.2 % (ref 12.3–15.4)
GFR SERPL CREATININE-BSD FRML MDRD: 52 ML/MIN/1.73
GLOBULIN UR ELPH-MCNC: 2.7 GM/DL
GLUCOSE SERPL-MCNC: 120 MG/DL (ref 65–99)
GLUCOSE UR STRIP-MCNC: NEGATIVE MG/DL
HBA1C MFR BLD: 5.4 % (ref 4.8–5.6)
HCT VFR BLD AUTO: 43.7 % (ref 34–46.6)
HDLC SERPL-MCNC: 53 MG/DL (ref 40–60)
HGB BLD-MCNC: 14.9 G/DL (ref 12–15.9)
HGB UR QL STRIP.AUTO: NEGATIVE
IMM GRANULOCYTES # BLD AUTO: 0.03 10*3/MM3 (ref 0–0.05)
IMM GRANULOCYTES NFR BLD AUTO: 0.4 % (ref 0–0.5)
KETONES UR QL STRIP: NEGATIVE
LDLC SERPL CALC-MCNC: 121 MG/DL (ref 0–100)
LDLC/HDLC SERPL: 2.25 {RATIO}
LEUKOCYTE ESTERASE UR QL STRIP.AUTO: NEGATIVE
LYMPHOCYTES # BLD AUTO: 2.53 10*3/MM3 (ref 0.7–3.1)
LYMPHOCYTES NFR BLD AUTO: 33.5 % (ref 19.6–45.3)
MCH RBC QN AUTO: 30.1 PG (ref 26.6–33)
MCHC RBC AUTO-ENTMCNC: 34.1 G/DL (ref 31.5–35.7)
MCV RBC AUTO: 88.3 FL (ref 79–97)
MONOCYTES # BLD AUTO: 0.68 10*3/MM3 (ref 0.1–0.9)
MONOCYTES NFR BLD AUTO: 9 % (ref 5–12)
NEUTROPHILS NFR BLD AUTO: 4.11 10*3/MM3 (ref 1.7–7)
NEUTROPHILS NFR BLD AUTO: 54.5 % (ref 42.7–76)
NITRITE UR QL STRIP: NEGATIVE
NRBC BLD AUTO-RTO: 0 /100 WBC (ref 0–0.2)
PH UR STRIP.AUTO: 5.5 [PH] (ref 5–8)
PLATELET # BLD AUTO: 292 10*3/MM3 (ref 140–450)
PMV BLD AUTO: 10.6 FL (ref 6–12)
POTASSIUM SERPL-SCNC: 5 MMOL/L (ref 3.5–5.2)
PROT SERPL-MCNC: 7.4 G/DL (ref 6–8.5)
PROT UR QL STRIP: NEGATIVE
RBC # BLD AUTO: 4.95 10*6/MM3 (ref 3.77–5.28)
SODIUM SERPL-SCNC: 138 MMOL/L (ref 136–145)
SP GR UR STRIP: >=1.03 (ref 1–1.03)
TRIGL SERPL-MCNC: 100 MG/DL (ref 0–150)
UROBILINOGEN UR QL STRIP: ABNORMAL
VLDLC SERPL-MCNC: 18 MG/DL (ref 5–40)
WBC # BLD AUTO: 7.55 10*3/MM3 (ref 3.4–10.8)

## 2021-06-09 PROCEDURE — 80053 COMPREHEN METABOLIC PANEL: CPT

## 2021-06-09 PROCEDURE — 85025 COMPLETE CBC W/AUTO DIFF WBC: CPT

## 2021-06-09 PROCEDURE — 80061 LIPID PANEL: CPT

## 2021-06-09 PROCEDURE — 81003 URINALYSIS AUTO W/O SCOPE: CPT

## 2021-06-09 PROCEDURE — 83036 HEMOGLOBIN GLYCOSYLATED A1C: CPT

## 2021-06-09 PROCEDURE — 82306 VITAMIN D 25 HYDROXY: CPT

## 2021-06-14 ENCOUNTER — OFFICE VISIT (OUTPATIENT)
Dept: FAMILY MEDICINE CLINIC | Facility: CLINIC | Age: 71
End: 2021-06-14

## 2021-06-14 VITALS
DIASTOLIC BLOOD PRESSURE: 74 MMHG | BODY MASS INDEX: 24.89 KG/M2 | WEIGHT: 164.2 LBS | HEIGHT: 68 IN | OXYGEN SATURATION: 97 % | SYSTOLIC BLOOD PRESSURE: 126 MMHG | HEART RATE: 73 BPM

## 2021-06-14 DIAGNOSIS — M81.0 AGE RELATED OSTEOPOROSIS, UNSPECIFIED PATHOLOGICAL FRACTURE PRESENCE: ICD-10-CM

## 2021-06-14 DIAGNOSIS — E78.5 HYPERLIPIDEMIA, UNSPECIFIED HYPERLIPIDEMIA TYPE: ICD-10-CM

## 2021-06-14 DIAGNOSIS — F41.1 GAD (GENERALIZED ANXIETY DISORDER): ICD-10-CM

## 2021-06-14 DIAGNOSIS — K21.9 GASTROESOPHAGEAL REFLUX DISEASE WITHOUT ESOPHAGITIS: ICD-10-CM

## 2021-06-14 DIAGNOSIS — F51.01 PRIMARY INSOMNIA: ICD-10-CM

## 2021-06-14 DIAGNOSIS — S82.64XS CLOSED NONDISPLACED FRACTURE OF LATERAL MALLEOLUS OF RIGHT FIBULA, SEQUELA: ICD-10-CM

## 2021-06-14 DIAGNOSIS — Z00.00 MEDICARE ANNUAL WELLNESS VISIT, SUBSEQUENT: Primary | ICD-10-CM

## 2021-06-14 PROBLEM — S82.64XA CLOSED NONDISPLACED FRACTURE OF LATERAL MALLEOLUS OF RIGHT FIBULA: Status: ACTIVE | Noted: 2021-06-14

## 2021-06-14 PROCEDURE — G0439 PPPS, SUBSEQ VISIT: HCPCS | Performed by: NURSE PRACTITIONER

## 2021-06-14 RX ORDER — ZOLPIDEM TARTRATE 10 MG/1
10 TABLET ORAL NIGHTLY PRN
Qty: 30 TABLET | Refills: 2 | Status: SHIPPED | OUTPATIENT
Start: 2021-06-14 | End: 2021-09-14 | Stop reason: SDUPTHER

## 2021-06-14 NOTE — PATIENT INSTRUCTIONS
Rosuvastatin Tablets  What is this medicine?  ROSUVASTATIN (anna MANDO va sta tin) is known as a HMG-CoA reductase inhibitor or 'statin'. It lowers cholesterol and triglycerides in the blood. This drug may also reduce the risk of heart attack, stroke, or other health problems in patients with risk factors for heart disease. Diet and lifestyle changes are often used with this drug.  This medicine may be used for other purposes; ask your health care provider or pharmacist if you have questions.  COMMON BRAND NAME(S): Crestor  What should I tell my health care provider before I take this medicine?  They need to know if you have any of these conditions:  · diabetes  · if you often drink alcohol  · history of stroke  · kidney disease  · liver disease  · muscle aches or weakness  · thyroid disease  · an unusual or allergic reaction to rosuvastatin, other medicines, foods, dyes, or preservatives  · pregnant or trying to get pregnant  · breast-feeding  How should I use this medicine?  Take this medicine by mouth with a glass of water. Follow the directions on the prescription label. Do not cut, crush or chew this medicine. You can take this medicine with or without food. Take your doses at regular intervals. Do not take your medicine more often than directed.  Talk to your pediatrician regarding the use of this medicine in children. While this drug may be prescribed for children as young as 7 years old for selected conditions, precautions do apply.  Overdosage: If you think you have taken too much of this medicine contact a poison control center or emergency room at once.  NOTE: This medicine is only for you. Do not share this medicine with others.  What if I miss a dose?  If you miss a dose, take it as soon as you can. If your next dose is to be taken in less than 12 hours, then do not take the missed dose. Take the next dose at your regular time. Do not take double or extra doses.  What may interact with this medicine?  Do  not take this medicine with any of the following medications:  · herbal medicines like red yeast rice  This medicine may also interact with the following medications:  · alcohol  · antacids containing aluminum hydroxide or magnesium hydroxide  · cyclosporine  · other medicines for high cholesterol  · some medicines for HIV infection  · warfarin  This list may not describe all possible interactions. Give your health care provider a list of all the medicines, herbs, non-prescription drugs, or dietary supplements you use. Also tell them if you smoke, drink alcohol, or use illegal drugs. Some items may interact with your medicine.  What should I watch for while using this medicine?  Visit your doctor or health care professional for regular check-ups. You may need regular tests to make sure your liver is working properly.  Your health care professional may tell you to stop taking this medicine if you develop muscle problems. If your muscle problems do not go away after stopping this medicine, contact your health care professional.  Do not become pregnant while taking this medicine. Women should inform their health care professional if they wish to become pregnant or think they might be pregnant. There is a potential for serious side effects to an unborn child. Talk to your health care professional or pharmacist for more information. Do not breast-feed an infant while taking this medicine.  This medicine may increase blood sugar. Ask your healthcare provider if changes in diet or medicines are needed if you have diabetes.  If you are going to need surgery or other procedure, tell your doctor that you are using this medicine.  This drug is only part of a total heart-health program. Your doctor or a dietician can suggest a low-cholesterol and low-fat diet to help. Avoid alcohol and smoking, and keep a proper exercise schedule.  This medicine may cause a decrease in Co-Enzyme Q-10. You should make sure that you get enough  Co-Enzyme Q-10 while you are taking this medicine. Discuss the foods you eat and the vitamins you take with your health care professional.  What side effects may I notice from receiving this medicine?  Side effects that you should report to your doctor or health care professional as soon as possible:  · allergic reactions like skin rash, itching or hives, swelling of the face, lips, or tongue  · confusion  · joint pain  · loss of memory  · redness, blistering, peeling or loosening of the skin, including inside the mouth  · signs and symptoms of high blood sugar such as being more thirsty or hungry or having to urinate more than normal. You may also feel very tired or have blurry vision.  · signs and symptoms of muscle injury like dark urine; trouble passing urine or change in the amount of urine; unusually weak or tired; muscle pain or side or back pain  · yellowing of the eyes or skin  Side effects that usually do not require medical attention (report to your doctor or health care professional if they continue or are bothersome):  · constipation  · diarrhea  · dizziness  · gas  · headache  · nausea  · stomach pain  · trouble sleeping  · upset stomach  This list may not describe all possible side effects. Call your doctor for medical advice about side effects. You may report side effects to FDA at 0-412-FDA-2370.  Where should I keep my medicine?  Keep out of the reach of children.  Store at room temperature between 20 and 25 degrees C (68 and 77 degrees F). Keep container tightly closed (protect from moisture). Throw away any unused medicine after the expiration date.  NOTE: This sheet is a summary. It may not cover all possible information. If you have questions about this medicine, talk to your doctor, pharmacist, or health care provider.  © 2021 [employer] (2019-10-10 08:25:08)    Adhesive Capsulitis    Adhesive capsulitis, also called frozen shoulder, causes the shoulder to become stiff and painful  to move. This condition happens when there is inflammation of the tendons and ligaments that surround the shoulder joint (shoulder capsule).  What are the causes?  This condition may be caused by:  · An injury to your shoulder joint.  · Straining your shoulder.  · Not moving your shoulder for a period of time. This can happen if your arm was injured or in a sling.  · Long-standing conditions, such as:  ? Diabetes.  ? Thyroid problems.  ? Heart disease.  ? Stroke.  ? Rheumatoid arthritis.  ? Lung disease.  In some cases, the cause is not known.  What increases the risk?  You are more likely to develop this condition if you are:  · A woman.  · Older than 40 years of age.  What are the signs or symptoms?  Symptoms of this condition include:  · Pain in your shoulder when you move your arm. There may also be pain when parts of your shoulder are touched. The pain may be worse at night or when you are resting.  · A sore or aching shoulder.  · The inability to move your shoulder normally.  · Muscle spasms.  How is this diagnosed?  This condition is diagnosed with a physical exam and imaging tests, such as an X-ray or MRI.  How is this treated?  This condition may be treated with:  · Treatment of the underlying cause or condition.  · Medicine. Medicine may be given to relieve pain, inflammation, or muscle spasms.  · Steroid injections into the shoulder joint.  · Physical therapy. This involves performing exercises to get the shoulder moving again.  · Acupuncture. This is a type of treatment that involves stimulating specific points on your body by inserting thin needles through your skin.  · Shoulder manipulation. This is a procedure to move the shoulder into another position. It is done after you are given a medicine to make you fall asleep (general anesthetic). The joint may also be injected with salt water at high pressure to break down scarring.  · Surgery. This may be done in severe cases when other treatments have  failed.  Although most people recover completely from adhesive capsulitis, some may not regain full shoulder movement.  Follow these instructions at home:  Managing pain, stiffness, and swelling         · If directed, put ice on the injured area:  ? Put ice in a plastic bag.  ? Place a towel between your skin and the bag.  ? Leave the ice on for 20 minutes, 2-3 times per day.  · If directed, apply heat to the affected area before you exercise. Use the heat source that your health care provider recommends, such as a moist heat pack or a heating pad.  ? Place a towel between your skin and the heat source.  ? Leave the heat on for 20-30 minutes.  ? Remove the heat if your skin turns bright red. This is especially important if you are unable to feel pain, heat, or cold. You may have a greater risk of getting burned.  General instructions  · Take over-the-counter and prescription medicines only as told by your health care provider.  · If you are being treated with physical therapy, follow instructions from your physical therapist.  · Avoid exercises that put a lot of demand on your shoulder, such as throwing. These exercises can make pain worse.  · Keep all follow-up visits as told by your health care provider. This is important.  Contact a health care provider if:  · You develop new symptoms.  · Your symptoms get worse.  Summary  · Adhesive capsulitis, also called frozen shoulder, causes the shoulder to become stiff and painful to move.  · You are more likely to have this condition if you are a woman and over age 40.  · It is treated with physical therapy, medicines, and sometimes surgery.  This information is not intended to replace advice given to you by your health care provider. Make sure you discuss any questions you have with your health care provider.  Document Revised: 05/24/2019 Document Reviewed: 05/24/2019  Elsevier Patient Education © 2021 [employer]    Adhesive Capsulitis    Adhesive capsulitis, also  called frozen shoulder, causes the shoulder to become stiff and painful to move. This condition happens when there is inflammation of the tendons and ligaments that surround the shoulder joint (shoulder capsule).  What are the causes?  This condition may be caused by:  · An injury to your shoulder joint.  · Straining your shoulder.  · Not moving your shoulder for a period of time. This can happen if your arm was injured or in a sling.  · Long-standing conditions, such as:  ? Diabetes.  ? Thyroid problems.  ? Heart disease.  ? Stroke.  ? Rheumatoid arthritis.  ? Lung disease.  In some cases, the cause is not known.  What increases the risk?  You are more likely to develop this condition if you are:  · A woman.  · Older than 40 years of age.  What are the signs or symptoms?  Symptoms of this condition include:  · Pain in your shoulder when you move your arm. There may also be pain when parts of your shoulder are touched. The pain may be worse at night or when you are resting.  · A sore or aching shoulder.  · The inability to move your shoulder normally.  · Muscle spasms.  How is this diagnosed?  This condition is diagnosed with a physical exam and imaging tests, such as an X-ray or MRI.  How is this treated?  This condition may be treated with:  · Treatment of the underlying cause or condition.  · Medicine. Medicine may be given to relieve pain, inflammation, or muscle spasms.  · Steroid injections into the shoulder joint.  · Physical therapy. This involves performing exercises to get the shoulder moving again.  · Acupuncture. This is a type of treatment that involves stimulating specific points on your body by inserting thin needles through your skin.  · Shoulder manipulation. This is a procedure to move the shoulder into another position. It is done after you are given a medicine to make you fall asleep (general anesthetic). The joint may also be injected with salt water at high pressure to break down  scarring.  · Surgery. This may be done in severe cases when other treatments have failed.  Although most people recover completely from adhesive capsulitis, some may not regain full shoulder movement.  Follow these instructions at home:  Managing pain, stiffness, and swelling         · If directed, put ice on the injured area:  ? Put ice in a plastic bag.  ? Place a towel between your skin and the bag.  ? Leave the ice on for 20 minutes, 2-3 times per day.  · If directed, apply heat to the affected area before you exercise. Use the heat source that your health care provider recommends, such as a moist heat pack or a heating pad.  ? Place a towel between your skin and the heat source.  ? Leave the heat on for 20-30 minutes.  ? Remove the heat if your skin turns bright red. This is especially important if you are unable to feel pain, heat, or cold. You may have a greater risk of getting burned.  General instructions  · Take over-the-counter and prescription medicines only as told by your health care provider.  · If you are being treated with physical therapy, follow instructions from your physical therapist.  · Avoid exercises that put a lot of demand on your shoulder, such as throwing. These exercises can make pain worse.  · Keep all follow-up visits as told by your health care provider. This is important.  Contact a health care provider if:  · You develop new symptoms.  · Your symptoms get worse.  Summary  · Adhesive capsulitis, also called frozen shoulder, causes the shoulder to become stiff and painful to move.  · You are more likely to have this condition if you are a woman and over age 40.  · It is treated with physical therapy, medicines, and sometimes surgery.  This information is not intended to replace advice given to you by your health care provider. Make sure you discuss any questions you have with your health care provider.  Document Revised: 05/24/2019 Document Reviewed: 05/24/2019  Elsevier Patient  Education © 2021 [employer]    Dyslipidemia  Dyslipidemia is an imbalance of waxy, fat-like substances (lipids) in the blood. The body needs lipids in small amounts. Dyslipidemia often involves a high level of cholesterol or triglycerides, which are types of lipids.  Common forms of dyslipidemia include:  · High levels of LDL cholesterol. LDL is the type of cholesterol that causes fatty deposits (plaques) to build up in the blood vessels that carry blood away from your heart (arteries).  · Low levels of HDL cholesterol. HDL cholesterol is the type of cholesterol that protects against heart disease. High levels of HDL remove the LDL buildup from arteries.  · High levels of triglycerides. Triglycerides are a fatty substance in the blood that is linked to a buildup of plaques in the arteries.  What are the causes?  Primary dyslipidemia is caused by changes (mutations) in genes that are passed down through families (inherited). These mutations cause several types of dyslipidemia.  Secondary dyslipidemia is caused by lifestyle choices and diseases that lead to dyslipidemia, such as:  · Eating a diet that is high in animal fat.  · Not getting enough exercise.  · Having diabetes, kidney disease, liver disease, or thyroid disease.  · Drinking large amounts of alcohol.  · Using certain medicines.  What increases the risk?  You are more likely to develop this condition if you are an older man or if you are a woman who has gone through menopause. Other risk factors include:  · Having a family history of dyslipidemia.  · Taking certain medicines, including birth control pills, steroids, some diuretics, and beta-blockers.  · Smoking cigarettes.  · Eating a high-fat diet.  · Having certain medical conditions such as diabetes, polycystic ovary syndrome (PCOS), kidney disease, liver disease, or hypothyroidism.  · Not exercising regularly.  · Being overweight or obese with too much belly fat.  What are the signs or symptoms?  In  most cases, dyslipidemia does not usually cause any symptoms.  In severe cases, very high lipid levels can cause:  · Fatty bumps under the skin (xanthomas).  · White or gray ring around the black center (pupil) of the eye.  Very high triglyceride levels can cause inflammation of the pancreas (pancreatitis).  How is this diagnosed?  Your health care provider may diagnose dyslipidemia based on a routine blood test (fasting blood test). Because most people do not have symptoms of the condition, this blood testing (lipid profile) is done on adults age 20 and older and is repeated every 5 years. This test checks:  · Total cholesterol. This measures the total amount of cholesterol in your blood, including LDL cholesterol, HDL cholesterol, and triglycerides. A healthy number is below 200.  · LDL cholesterol. The target number for LDL cholesterol is different for each person, depending on individual risk factors. Ask your health care provider what your LDL cholesterol should be.  · HDL cholesterol. An HDL level of 60 or higher is best because it helps to protect against heart disease. A number below 40 for men or below 50 for women increases the risk for heart disease.  · Triglycerides. A healthy triglyceride number is below 150.  If your lipid profile is abnormal, your health care provider may do other blood tests.  How is this treated?  Treatment depends on the type of dyslipidemia that you have and your other risk factors for heart disease and stroke. Your health care provider will have a target range for your lipid levels based on this information.  For many people, this condition may be treated by lifestyle changes, such as diet and exercise. Your health care provider may recommend that you:  · Get regular exercise.  · Make changes to your diet.  · Quit smoking if you smoke.  If diet changes and exercise do not help you reach your goals, your health care provider may also prescribe medicine to lower lipids. The most  commonly prescribed type of medicine lowers your LDL cholesterol (statin drug). If you have a high triglyceride level, your provider may prescribe another type of drug (fibrate) or an omega-3 fish oil supplement, or both.  Follow these instructions at home:    Eating and drinking  · Follow instructions from your health care provider or dietitian about eating or drinking restrictions.  · Eat a healthy diet as told by your health care provider. This can help you reach and maintain a healthy weight, lower your LDL cholesterol, and raise your HDL cholesterol. This may include:  ? Limiting your calories, if you are overweight.  ? Eating more fruits, vegetables, whole grains, fish, and lean meats.  ? Limiting saturated fat, trans fat, and cholesterol.  · If you drink alcohol:  ? Limit how much you use.  ? Be aware of how much alcohol is in your drink. In the [place] one drink equals one 12 oz bottle of beer (355 mL), one 5 oz glass of wine (148 mL), or one 1½ oz glass of hard liquor (44 mL).  · Do not drink alcohol if:  ? Your health care provider tells you not to drink.  ? You are pregnant, may be pregnant, or are planning to become pregnant.  Activity  · Get regular exercise. Start an exercise and strength training program as told by your health care provider. Ask your health care provider what activities are safe for you. Your health care provider may recommend:  ? 30 minutes of aerobic activity 4-6 days a week. Brisk walking is an example of aerobic activity.  ? Strength training 2 days a week.  General instructions  · Do not use any products that contain nicotine or tobacco, such as cigarettes, e-cigarettes, and chewing tobacco. If you need help quitting, ask your health care provider.  · Take over-the-counter and prescription medicines only as told by your health care provider. This includes supplements.  · Keep all follow-up visits as told by your health care provider.  Contact a health care provider if:  · You  are:  ? Having trouble sticking to your exercise or diet plan.  ? Struggling to quit smoking or control your use of alcohol.  Summary  · Dyslipidemia often involves a high level of cholesterol or triglycerides, which are types of lipids.  · Treatment depends on the type of dyslipidemia that you have and your other risk factors for heart disease and stroke.  · For many people, treatment starts with lifestyle changes, such as diet and exercise.  · Your health care provider may prescribe medicine to lower lipids.  This information is not intended to replace advice given to you by your health care provider. Make sure you discuss any questions you have with your health care provider.  Document Revised: 08/12/2019 Document Reviewed: 07/19/2019  [facility] Patient Education © 2021 [facility]    Preventing High Cholesterol  Cholesterol is a white, waxy substance similar to fat that the human body needs to help build cells. The liver makes all the cholesterol that a person's body needs. Having high cholesterol (hypercholesterolemia) increases your risk for heart disease and stroke. Extra or excess cholesterol comes from the food that you eat.  High cholesterol can often be prevented with diet and lifestyle changes. If you already have high cholesterol, you can control it with diet, lifestyle changes, and medicines.  How can high cholesterol affect me?  If you have high cholesterol, fatty deposits (plaques) may build up on the walls of your blood vessels. The blood vessels that carry blood away from your heart are called arteries. Plaques make the arteries narrower and stiffer. This in turn can:  · Restrict or block blood flow and cause blood clots to form.  · Increase your risk for heart attack and stroke.  What can increase my risk for high cholesterol?  This condition is more likely to develop in people who:  · Eat foods that are high in saturated fat or cholesterol. Saturated fat is mostly found in foods that come from  animal sources.  · Are overweight.  · Are not getting enough exercise.  · Have a family history of high cholesterol (familial hypercholesterolemia).  What actions can I take to prevent this?  Nutrition    · Eat less saturated fat.  · Avoid trans fats (partially hydrogenated oils). These are often found in margarine and in some baked goods, fried foods, and snacks bought in packages.  · Avoid precooked or cured meat, such as ibarra, sausages, or meat loaves.  · Avoid foods and drinks that have added sugars.  · Eat more fruits, vegetables, and whole grains.  · Choose healthy sources of protein, such as fish, poultry, lean cuts of red meat, beans, peas, lentils, and nuts.  · Choose healthy sources of fat, such as:  ? Nuts.  ? Vegetable oils, especially olive oil.  ? Fish that have healthy fats, such as omega-3 fatty acids. These fish include mackerel or salmon.  Lifestyle  · Lose weight if you are overweight. Maintaining a healthy body mass index (BMI) can help prevent or control high cholesterol. It can also lower your risk for diabetes and high blood pressure. Ask your health care provider to help you with a diet and exercise plan to lose weight safely.  · Do not use any products that contain nicotine or tobacco, such as cigarettes, e-cigarettes, and chewing tobacco. If you need help quitting, ask your health care provider.  Alcohol use  · Do not drink alcohol if:  ? Your health care provider tells you not to drink.  ? You are pregnant, may be pregnant, or are planning to become pregnant.  · If you drink alcohol:  ? Limit how much you use to:  § 0-1 drink a day for women.  § 0-2 drinks a day for men.  ? Be aware of how much alcohol is in your drink. In the [place] one drink equals one 12 oz bottle of beer (355 mL), one 5 oz glass of wine (148 mL), or one 1½ oz glass of hard liquor (44 mL).  Activity    · Get enough exercise. Do exercises as told by your health care provider.  · Each week, do at least 150 minutes of  exercise that takes a medium level of effort (moderate-intensity exercise). This kind of exercise:  ? Makes your heart beat faster while allowing you to still be able to talk.  ? Can be done in short sessions several times a day or longer sessions a few times a week. For example, on 5 days each week, you could walk fast or ride your bike 3 times a day for 10 minutes each time.  Medicines  · Your health care provider may recommend medicines to help lower cholesterol. This may be a medicine to lower the amount of cholesterol that your liver makes. You may need medicine if:  ? Diet and lifestyle changes have not lowered your cholesterol enough.  ? You have high cholesterol and other risk factors for heart disease or stroke.  · Take over-the-counter and prescription medicines only as told by your health care provider.  General information  · Manage your risk factors for high cholesterol. Talk with your health care provider about all your risk factors and how to lower your risk.  · Manage other conditions that you have, such as diabetes or high blood pressure (hypertension).  · Have blood tests to check your cholesterol levels at regular points in time as told by your health care provider.  · Keep all follow-up visits as told by your health care provider. This is important.  Where to find more information  · American Heart Association: www.heart.org  · National Heart, Lung, and Blood [place]: www.nhlbi.nih.gov  Summary  · High cholesterol increases your risk for heart disease and stroke. By keeping your cholesterol level low, you can reduce your risk for these conditions.  · High cholesterol can often be prevented with diet and lifestyle changes.  · Work with your health care provider to manage your risk factors, and have your blood tested regularly.  This information is not intended to replace advice given to you by your health care provider. Make sure you discuss any questions you have with your health care  "provider.  Document Revised: 09/29/2020 Document Reviewed: 09/29/2020  [employer] Patient Education © 2021 [employer]    High Cholesterol    High cholesterol is a condition in which the blood has high levels of a white, waxy substance similar to fat (cholesterol). The liver makes all the cholesterol that the body needs. The human body needs small amounts of cholesterol to help build cells. A person gets extra or excess cholesterol from the food that he or she eats.  The blood carries cholesterol from the liver to the rest of the body. If you have high cholesterol, deposits (plaques) may build up on the walls of your arteries. Arteries are the blood vessels that carry blood away from your heart. These plaques make the arteries narrow and stiff.  Cholesterol plaques increase your risk for heart attack and stroke. Work with your health care provider to keep your cholesterol levels in a healthy range.  What increases the risk?  The following factors may make you more likely to develop this condition:  · Eating foods that are high in animal fat (saturated fat) or cholesterol.  · Being overweight.  · Not getting enough exercise.  · A family history of high cholesterol (familial hypercholesterolemia).  · Use of tobacco products.  · Having diabetes.  What are the signs or symptoms?  There are no symptoms of this condition.  How is this diagnosed?  This condition may be diagnosed based on the results of a blood test.  · If you are older than 20 years of age, your health care provider may check your cholesterol levels every 4-6 years.  · You may be checked more often if you have high cholesterol or other risk factors for heart disease.  The blood test for cholesterol measures:  · \"Bad\" cholesterol, or LDL cholesterol. This is the main type of cholesterol that causes heart disease. The desired level is less than 100 mg/dL.  · \"Good\" cholesterol, or HDL cholesterol. HDL helps protect against heart disease by cleaning the " arteries and carrying the LDL to the liver for processing. The desired level for HDL is 60 mg/dL or higher.  · Triglycerides. These are fats that your body can store or burn for energy. The desired level is less than 150 mg/dL.  · Total cholesterol. This measures the total amount of cholesterol in your blood and includes LDL, HDL, and triglycerides. The desired level is less than 200 mg/dL.  How is this treated?  This condition may be treated with:  · Diet changes. You may be asked to eat foods that have more fiber and less saturated fats or added sugar.  · Lifestyle changes. These may include regular exercise, maintaining a healthy weight, and quitting use of tobacco products.  · Medicines. These are given when diet and lifestyle changes have not worked. You may be prescribed a statin medicine to help lower your cholesterol levels.  Follow these instructions at home:  Eating and drinking    · Eat a healthy, balanced diet. This diet includes:  ? Daily servings of a variety of fresh, frozen, or canned fruits and vegetables.  ? Daily servings of whole grain foods that are rich in fiber.  ? Foods that are low in saturated fats and trans fats. These include poultry and fish without skin, lean cuts of meat, and low-fat dairy products.  ? A variety of fish, especially oily fish that contain omega-3 fatty acids. Aim to eat fish at least 2 times a week.  · Avoid foods and drinks that have added sugar.  · Use healthy cooking methods, such as roasting, grilling, broiling, baking, poaching, steaming, and stir-frying. Do not apodaca your food except for stir-frying.  Lifestyle    · Get regular exercise. Aim to exercise for a total of 150 minutes a week. Increase your activity level by doing activities such as gardening, walking, and taking the stairs.  · Do not use any products that contain nicotine or tobacco, such as cigarettes, e-cigarettes, and chewing tobacco. If you need help quitting, ask your health care provider.  General  "instructions  · Take over-the-counter and prescription medicines only as told by your health care provider.  · Keep all follow-up visits as told by your health care provider. This is important.  Where to find more information  · American Heart Association: www.heart.org  · National Heart, Lung, and Blood [place]: www.nhlbi.nih.gov  Contact a health care provider if:  · You have trouble achieving or maintaining a healthy diet or weight.  · You are starting an exercise program.  · You are unable to stop smoking.  Get help right away if:  · You have chest pain.  · You have trouble breathing.  · You have any symptoms of a stroke. \"BE FAST\" is an easy way to remember the main warning signs of a stroke:  ? B - Balance. Signs are dizziness, sudden trouble walking, or loss of balance.  ? E - Eyes. Signs are trouble seeing or a sudden change in vision.  ? F - Face. Signs are sudden weakness or numbness of the face, or the face or eyelid drooping on one side.  ? A - Arms. Signs are weakness or numbness in an arm. This happens suddenly and usually on one side of the body.  ? S - Speech. Signs are sudden trouble speaking, slurred speech, or trouble understanding what people say.  ? T - Time. Time to call emergency services. Write down what time symptoms started.  · You have other signs of a stroke, such as:  ? A sudden, severe headache with no known cause.  ? Nausea or vomiting.  ? Seizure.  These symptoms may represent a serious problem that is an emergency. Do not wait to see if the symptoms will go away. Get medical help right away. Call your local emergency services (911 in the [place] Do not drive yourself to the hospital.  Summary  · Cholesterol plaques increase your risk for heart attack and stroke. Work with your health care provider to keep your cholesterol levels in a healthy range.  · Eat a healthy, balanced diet, get regular exercise, and maintain a healthy weight.  · Do not use any products that contain nicotine " or tobacco, such as cigarettes, e-cigarettes, and chewing tobacco.  · Get help right away if you have any symptoms of a stroke.  This information is not intended to replace advice given to you by your health care provider. Make sure you discuss any questions you have with your health care provider.  Document Revised: 11/16/2020 Document Reviewed: 11/16/2020  [employer] Patient Education © 2021 [employer]

## 2021-06-14 NOTE — PROGRESS NOTES
The ABCs of the Annual Wellness Visit  Subsequent Medicare Wellness Visit    Chief Complaint   Patient presents with   • Medicare Wellness-subsequent       Subjective   History of Present Illness:  Dixie Cervantes is a 70 y.o. female who presents for a Subsequent Medicare Wellness Visit.  Is on low oxalate diet because her  has kidney stones; so she is cooking more meat, less vegetables. Bakes more for stress relief  Tried to wean off Ambien, unable to sleep; no abnormal behaviors. Takes xanax prn only.  Continue to wear right LE boot, fibula fracture 01/2020. Developed tendonitis, followed by [facility] ortho and [facility] for osteoporosis, on Fosamax  She and her  are fully vaccinated for COVID, has been catching up on her appointments, dentist, eye, PCP, ortho and Endo    HEALTH RISK ASSESSMENT    Recent Hospitalizations:  No hospitalization(s) within the last year.    Current Medical Providers:  Patient Care Team:  Linda Adhikari APRN as PCP - General (Nurse Practitioner)  Camden Franco MD (Neurosurgery)  Jason Pino MD as Consulting Physician (Pulmonary Disease)  Fady Lane MD as Consulting Physician (Otolaryngology)  Clive Chin (Endocrinology)    Smoking Status:  Social History     Tobacco Use   Smoking Status Never Smoker   Smokeless Tobacco Never Used       Alcohol Consumption:  Social History     Substance and Sexual Activity   Alcohol Use Yes    Comment: 2-3 drinks a month       Depression Screen:   PHQ-2/PHQ-9 Depression Screening 6/14/2021   Little interest or pleasure in doing things 0   Feeling down, depressed, or hopeless 0   Total Score 0       Fall Risk Screen:  SHAYEADI Fall Risk Assessment was completed, and patient is at LOW risk for falls.Assessment completed on:6/14/2021    Health Habits and Functional and Cognitive Screening:  Functional & Cognitive Status 6/14/2021   Do you have difficulty preparing food and eating?  No   Do you have difficulty bathing yourself, getting dressed or grooming yourself? No   Do you have difficulty using the toilet? No   Do you have difficulty moving around from place to place? No   Do you have trouble with steps or getting out of a bed or a chair? No   Current Diet Well Balanced Diet   Dental Exam Up to date   Eye Exam Up to date   Exercise (times per week) 4 times per week   Current Exercise Activities Include Swimming   Do you need help using the phone?  No   Are you deaf or do you have serious difficulty hearing?  No   Do you need help with transportation? No   Do you need help shopping? No   Do you need help preparing meals?  No   Do you need help with housework?  No   Do you need help with laundry? No   Do you need help taking your medications? No   Do you need help managing money? No   Do you ever drive or ride in a car without wearing a seat belt? No   Have you felt unusual stress, anger or loneliness in the last month? No   Who do you live with? Spouse   If you need help, do you have trouble finding someone available to you? No   Have you been bothered in the last four weeks by sexual problems? No   Do you have difficulty concentrating, remembering or making decisions? No         Does the patient have evidence of cognitive impairment? No    Asprin use counseling:Does not need ASA (and currently is not on it)    Age-appropriate Screening Schedule:  Refer to the list below for future screening recommendations based on patient's age, sex and/or medical conditions. Orders for these recommended tests are listed in the plan section. The patient has been provided with a written plan.    Health Maintenance   Topic Date Due   • ZOSTER VACCINE (2 of 3) 02/26/2014   • INFLUENZA VACCINE  08/01/2021   • MAMMOGRAM  05/28/2022   • LIPID PANEL  06/09/2022   • DXA SCAN  04/05/2023   • TDAP/TD VACCINES (2 - Td or Tdap) 01/01/2024          The following portions of the patient's history were reviewed and  updated as appropriate: allergies, current medications, past family history, past medical history, past social history, past surgical history and problem list.    Outpatient Medications Prior to Visit   Medication Sig Dispense Refill   • alendronate (FOSAMAX) 70 MG tablet      • ALPRAZolam (XANAX) 0.25 MG tablet Take 0.5 tablets by mouth Daily As Needed for Anxiety. 15 tablet 0   • diclofenac (VOLTAREN) 1 % gel gel As Needed.     • glucosamine-chondroitin 500-400 MG capsule capsule Take 1 capsule by mouth 3 (Three) Times a Day With Meals.     • Lactobacillus (PROBIOTIC ACIDOPHILUS PO) Take 1 tablet by mouth Daily.     • Multiple Vitamins-Minerals (MULTI VITAMIN/MINERALS) tablet Take 1 tablet by mouth.     • nystatin (MYCOSTATIN) 617282 UNIT/GM cream Apply  topically to the appropriate area as directed As Needed (yeast rash). 30 g 2   • zolpidem (AMBIEN) 10 MG tablet Take 1 tablet by mouth At Night As Needed for Sleep. Do not take with alprazolam 30 tablet 2     No facility-administered medications prior to visit.       Patient Active Problem List   Diagnosis   • Vestibular vertigo   • Urinary, incontinence, stress female   • Parasitic infection   • Irritable bowel syndrome (IBS)   • Inverted nipple   • Insomnia   • History of bladder infections   • GERD (gastroesophageal reflux disease)   • OCTAVIO (generalized anxiety disorder)   • External otitis   • Esophageal spasm   • Diverticulosis   • Chronic low back pain   • Chronic cough   • Age related osteoporosis   • Closed nondisplaced fracture of lateral malleolus of right fibula       Advanced Care Planning:  ACP discussion was held with the patient during this visit. Patient has an advance directive in EMR which is still valid.     Review of Systems   Constitutional: Negative for chills, fatigue and fever.   HENT: Negative for congestion and ear pain.    Eyes: Positive for visual disturbance (cataracts, worse on left).   Respiratory: Positive for cough. Negative for  "shortness of breath and wheezing.    Cardiovascular: Negative for chest pain, palpitations and leg swelling.   Gastrointestinal: Positive for abdominal pain and constipation. Negative for blood in stool, diarrhea, nausea and vomiting.   Endocrine: Negative for polydipsia and polyuria.   Genitourinary: Negative for difficulty urinating, dysuria and vaginal bleeding.   Musculoskeletal: Positive for arthralgias (left shoulder). Negative for back pain, neck pain and neck stiffness.   Skin: Negative for color change, pallor, rash and wound.        Sees derm annually   Allergic/Immunologic: Positive for environmental allergies.   Neurological: Positive for light-headedness (occ). Negative for dizziness, tremors, syncope and numbness.   Psychiatric/Behavioral: Positive for sleep disturbance. Negative for dysphoric mood. The patient is nervous/anxious (controlled with med).        Compared to one year ago, the patient feels her physical health is better.  Compared to one year ago, the patient feels her mental health is the same.    Reviewed chart for potential of high risk medication in the elderly: yes  Reviewed chart for potential of harmful drug interactions in the elderly:yes    Objective         Vitals:    06/14/21 1021   BP: 126/74   Pulse: 73   SpO2: 97%   Weight: 74.5 kg (164 lb 3.2 oz)   Height: 172.7 cm (67.99\")   PainSc: 0-No pain       Body mass index is 24.97 kg/m².  Discussed the patient's BMI with her. The BMI is in the acceptable range.    Physical Exam  Vitals and nursing note reviewed.   Constitutional:       General: She is not in acute distress.     Appearance: Normal appearance. She is well-developed. She is not diaphoretic.   HENT:      Head: Normocephalic and atraumatic.      Right Ear: Tympanic membrane and external ear normal.      Left Ear: Tympanic membrane and external ear normal.      Nose: Nose normal.      Mouth/Throat:      Mouth: Mucous membranes are moist.      Pharynx: Oropharynx is clear. " No oropharyngeal exudate.   Eyes:      General: No scleral icterus.        Right eye: No discharge.         Left eye: No discharge.      Conjunctiva/sclera: Conjunctivae normal.      Pupils: Pupils are equal, round, and reactive to light.   Neck:      Thyroid: No thyromegaly.      Vascular: No carotid bruit or JVD.      Trachea: No tracheal deviation.   Cardiovascular:      Rate and Rhythm: Normal rate and regular rhythm.      Heart sounds: Normal heart sounds. No murmur heard.   No friction rub. No gallop.    Pulmonary:      Effort: Pulmonary effort is normal. No respiratory distress.      Breath sounds: Normal breath sounds. No stridor. No wheezing or rales.   Chest:      Chest wall: No tenderness.   Abdominal:      General: Bowel sounds are normal. There is no distension.      Palpations: Abdomen is soft. There is no mass.      Tenderness: There is no abdominal tenderness. There is no guarding or rebound.      Hernia: No hernia is present.   Musculoskeletal:         General: No tenderness or deformity.      Cervical back: Normal range of motion and neck supple.      Comments: RLE boot in place   Lymphadenopathy:      Cervical: No cervical adenopathy.   Skin:     General: Skin is warm and dry.      Coloration: Skin is not pale.      Findings: No erythema or rash.   Neurological:      Mental Status: She is alert and oriented to person, place, and time.      Cranial Nerves: No cranial nerve deficit.      Motor: No abnormal muscle tone.      Coordination: Coordination normal.      Deep Tendon Reflexes: Reflexes are normal and symmetric. Reflexes normal.   Psychiatric:         Behavior: Behavior normal.         Thought Content: Thought content normal.         Judgment: Judgment normal.         Lab Results   Component Value Date    TRIG 100 06/09/2021    HDL 53 06/09/2021     (H) 06/09/2021    VLDL 18 06/09/2021    HGBA1C 5.40 06/09/2021        Assessment/Plan   Medicare Risks and Personalized Health Plan  CMS  Preventative Services Quick Reference  Advance Directive Discussion  Breast Cancer/Mammogram Screening  Cardiovascular risk  Fall Risk  Osteoporosis Risk    The above risks/problems have been discussed with the patient.  Pertinent information has been shared with the patient in the After Visit Summary.  Follow up plans and orders are seen below in the Assessment/Plan Section.    Diagnoses and all orders for this visit:    1. Medicare annual wellness visit, subsequent (Primary)    2. Primary insomnia  -     zolpidem (AMBIEN) 10 MG tablet; Take 1 tablet by mouth At Night As Needed for Sleep. Do not take with alprazolam  Dispense: 30 tablet; Refill: 2    3. OCTAVIO (generalized anxiety disorder)    4. Age related osteoporosis, unspecified pathological fracture presence    5. Closed nondisplaced fracture of lateral malleolus of right fibula, sequela    6. Gastroesophageal reflux disease without esophagitis    7. Hyperlipidemia, unspecified hyperlipidemia type      Follow Up:  Return in about 3 months (around 9/14/2021) for Recheck.     An After Visit Summary and PPPS were given to the patient.       The 10-year ASCVD risk score (Dozier SUGEY Jr., et al., 2013) is: 10.2%    Values used to calculate the score:      Age: 70 years      Sex: Female      Is Non- : No      Diabetic: No      Tobacco smoker: No      Systolic Blood Pressure: 126 mmHg      Is BP treated: No      HDL Cholesterol: 53 mg/dL      Total Cholesterol: 192 mg/dL  This patient is on a controlled substance which improves symptoms/quality of life and is aware of the risks, benefits and possible side-effects current treatment. The patient denies any medication side-effects at this time. A controlled substance agreement will be obtained or is currently on file. We reviewed required monitoring for controlled substances including but not limited to quarterly follow-up visits, annual depression screening, and urine drug screens to which the patient  is agreeable. A MADY report has been or shortly will be reviewed. There are no signs of deviation or misuse.   Updated CSA and UDS today.  Ambien refilled for insomnia ok to continue xanax for anxiety  On Fosamax, prescribed by [facility] Endo for osteoporosis  Fracture is healed, still in boot for tendonitis, follow up with ortho  Reviewed lab results with patient, discussed ASCVD score, recommended rosuvastatin, information provided. Patient wants to think about it and will let me know if she will take the medications   Adequate calcium, vitamin D and weight bearing exercise are important for osteoporosis. Fall prevention is also important to reduce the risk of fracture. Reduce fall risk with regular vision checks, medication review, osteoporosis screening, strength and balance training. Remove tripping hazards, avoid standing too quickly, limit alcohol. Eat a healthy well-balanced diet, stay hydrated.  Nutrition and activity goals reviewed including: mainly water to drink, limit white flour/processed sugar, and processed foods, choose fresh fruits, vegetables, fish, lean meats,high fiber carbs, exercise  working toward 150 mins cardio per week, weight training 2x/week.  Patient was encouraged to keep me informed of any acute changes, lack of improvement, or any new concerning symptoms.

## 2021-06-16 ENCOUNTER — TELEPHONE (OUTPATIENT)
Dept: FAMILY MEDICINE CLINIC | Facility: CLINIC | Age: 71
End: 2021-06-16

## 2021-06-16 NOTE — TELEPHONE ENCOUNTER
Caller: Dixie Cervantes    Relationship to patient: Self    Best call back number: [phone]    PATIENT CALLING IN TO LET FERNANDO VILLA KNOW THAT SHE IS GOING TO CHANGE HER DIET AND TRY THAT FOR THREE MONTH TILL HER NEXT VISIT. SHE WOULD LIKE TO HOLD OFF ON TAKING THE NEW CHOLESTEROL MEDICATION UNTIL SHE SEES IF THE DIET CHANGE WILL BENEFIT HER.      If a prescription is needed, what is your preferred pharmacy:

## 2021-06-22 ENCOUNTER — TELEPHONE (OUTPATIENT)
Dept: FAMILY MEDICINE CLINIC | Facility: CLINIC | Age: 71
End: 2021-06-22

## 2021-06-22 DIAGNOSIS — M25.562 LEFT KNEE PAIN, UNSPECIFIED CHRONICITY: Primary | ICD-10-CM

## 2021-06-22 NOTE — TELEPHONE ENCOUNTER
Caller: Dixie Cervantes    Relationship: Self    Best call back number: [phone]    What is the best time to reach you: ANYTIME     Who are you requesting to speak with (clinical staff, provider,  specific staff member): CLINICAL STAFF    Do you know the name of the person who called: SELF    What was the call regarding: PATIENT STATES THAT SHE HAS A BROKEN ANKLE. PATIENT STATES THAT THIS IS EFFECTING HER LEFT KNEE. PATIENT WANTS TO START PHYSICAL THERAPY.    Do you require a callback: YES

## 2021-06-23 NOTE — TELEPHONE ENCOUNTER
Called and informed pt PT referral has been placed. Once authorized by insurance, will get a call to schedule an appointment. Pt verbalized understanding and has no further questions at this time.

## 2021-09-14 ENCOUNTER — OFFICE VISIT (OUTPATIENT)
Dept: FAMILY MEDICINE CLINIC | Facility: CLINIC | Age: 71
End: 2021-09-14

## 2021-09-14 VITALS
SYSTOLIC BLOOD PRESSURE: 126 MMHG | DIASTOLIC BLOOD PRESSURE: 78 MMHG | BODY MASS INDEX: 25.04 KG/M2 | WEIGHT: 165.2 LBS | HEIGHT: 68 IN | OXYGEN SATURATION: 99 % | HEART RATE: 83 BPM

## 2021-09-14 DIAGNOSIS — F51.01 PRIMARY INSOMNIA: Primary | ICD-10-CM

## 2021-09-14 DIAGNOSIS — F41.9 ANXIETY: ICD-10-CM

## 2021-09-14 PROCEDURE — 99213 OFFICE O/P EST LOW 20 MIN: CPT | Performed by: NURSE PRACTITIONER

## 2021-09-14 RX ORDER — ZOLPIDEM TARTRATE 5 MG/1
5-10 TABLET ORAL NIGHTLY PRN
Qty: 50 TABLET | Refills: 2 | Status: SHIPPED | OUTPATIENT
Start: 2021-09-14 | End: 2021-12-14 | Stop reason: SDUPTHER

## 2021-09-14 NOTE — PROGRESS NOTES
"Subjective   Dixie Cervantes is a 70 y.o. female.   Chief Complaint   Patient presents with   • Insomnia     f/u       History of Present Illness   Here for follow up for insomnia, has been on ambien for several years, has been trying to cut10 mg in half, but pills are small, prefers to try 5 mg tablet and take 2 if needed. Denies unusual behaviors with Ambien, rarely takes xanax  wants to hold off statin for high cholesterol, will continue to work on diet.  Having issues with allergies recently  The following portions of the patient's history were reviewed and updated as appropriate: allergies, current medications and problem list.    Review of Systems   Constitutional: Negative for chills, fatigue and fever.   Respiratory: Positive for cough, chest tightness (from coughing) and wheezing (occ).    Cardiovascular: Negative for chest pain.   Musculoskeletal: Positive for arthralgias.   Neurological: Positive for headaches (frontal ). Negative for dizziness.   Psychiatric/Behavioral: Positive for sleep disturbance (controlled with ambien). Negative for self-injury and suicidal ideas. The patient is nervous/anxious.        Objective   Physical Exam  Constitutional:       General: She is not in acute distress.     Appearance: Normal appearance. She is not ill-appearing, toxic-appearing or diaphoretic.   HENT:      Head: Normocephalic and atraumatic.   Neck:      Vascular: No carotid bruit.   Cardiovascular:      Rate and Rhythm: Normal rate.   Pulmonary:      Effort: Pulmonary effort is normal. No respiratory distress.   Neurological:      Mental Status: She is alert and oriented to person, place, and time.   Psychiatric:         Mood and Affect: Mood normal.         Thought Content: Thought content normal.       /78   Pulse 83   Ht 172.7 cm (67.99\")   Wt 74.9 kg (165 lb 3.2 oz)   SpO2 99%   BMI 25.12 kg/m²     Assessment/Plan   Diagnoses and all orders for this visit:    1. Primary insomnia (Primary)  -     " zolpidem (AMBIEN) 5 MG tablet; Take 1-2 tablets by mouth At Night As Needed for Sleep. Do not take with alprazolam  Dispense: 50 tablet; Refill: 2    2. Anxiety      This patient is on a controlled substance which improves symptoms/quality of life and is aware of the risks, benefits and possible side-effects current treatment. The patient denies any medication side-effects at this time. A controlled substance agreement will be obtained or is currently on file. We reviewed required monitoring for controlled substances including but not limited to quarterly follow-up visits, annual depression screening, and urine drug screens to which the patient is agreeable. A [facility] report has been or shortly will be reviewed. There are no signs of deviation or misuse.   Continue to try weaning down on ambien dose, insomnia is stable  Anxiety is controlled with xanax prn  Patient was encouraged to keep me informed of any acute changes, lack of improvement, or any new concerning symptoms.

## 2021-09-16 ENCOUNTER — PRIOR AUTHORIZATION (OUTPATIENT)
Dept: FAMILY MEDICINE CLINIC | Facility: CLINIC | Age: 71
End: 2021-09-16

## 2021-09-16 NOTE — TELEPHONE ENCOUNTER
Prior Authorization for Zolpidem Tartrat 5mg tablets completed, office note faxed and sent to plan via Covermeds. Status pending;   KEY:[id number]  PA Case ID:[id number]

## 2021-09-19 PROBLEM — F41.9 ANXIETY: Status: ACTIVE | Noted: 2021-09-19

## 2021-09-27 NOTE — TELEPHONE ENCOUNTER
Approvedon September 16  Request Reference Number: [id number]. ZOLPIDEM TAB 5MG is approved through 12/31/2021. Your patient may now fill this prescription and it will be covered.

## 2021-11-16 DIAGNOSIS — F51.01 PRIMARY INSOMNIA: ICD-10-CM

## 2021-11-16 RX ORDER — ZOLPIDEM TARTRATE 10 MG/1
10 TABLET ORAL NIGHTLY PRN
Qty: 30 TABLET | OUTPATIENT
Start: 2021-11-16

## 2021-12-14 ENCOUNTER — OFFICE VISIT (OUTPATIENT)
Dept: FAMILY MEDICINE CLINIC | Facility: CLINIC | Age: 71
End: 2021-12-14

## 2021-12-14 VITALS
BODY MASS INDEX: 25.16 KG/M2 | HEIGHT: 68 IN | WEIGHT: 166 LBS | SYSTOLIC BLOOD PRESSURE: 122 MMHG | DIASTOLIC BLOOD PRESSURE: 82 MMHG | OXYGEN SATURATION: 97 % | HEART RATE: 87 BPM

## 2021-12-14 DIAGNOSIS — B37.2 SKIN YEAST INFECTION: ICD-10-CM

## 2021-12-14 DIAGNOSIS — F51.01 PRIMARY INSOMNIA: ICD-10-CM

## 2021-12-14 PROCEDURE — 99213 OFFICE O/P EST LOW 20 MIN: CPT | Performed by: NURSE PRACTITIONER

## 2021-12-14 RX ORDER — NYSTATIN 100000 U/G
CREAM TOPICAL AS NEEDED
Qty: 30 G | Refills: 2 | Status: SHIPPED | OUTPATIENT
Start: 2021-12-14

## 2021-12-14 RX ORDER — ZOLPIDEM TARTRATE 5 MG/1
5-10 TABLET ORAL NIGHTLY PRN
Qty: 50 TABLET | Refills: 2 | Status: SHIPPED | OUTPATIENT
Start: 2021-12-14 | End: 2022-03-17 | Stop reason: SDUPTHER

## 2021-12-14 NOTE — PATIENT INSTRUCTIONS
Denosumab injection  What is this medicine?  DENOSUMAB (den oh danny mab) slows bone breakdown. Prolia is used to treat osteoporosis in women after menopause and in men, and in people who are taking corticosteroids for 6 months or more. Xgeva is used to treat a high calcium level due to cancer and to prevent bone fractures and other bone problems caused by multiple myeloma or cancer bone metastases. Xgeva is also used to treat giant cell tumor of the bone.  This medicine may be used for other purposes; ask your health care provider or pharmacist if you have questions.  COMMON BRAND NAME(S): Prolia, XGEVA  What should I tell my health care provider before I take this medicine?  They need to know if you have any of these conditions:  · dental disease  · having surgery or tooth extraction  · infection  · kidney disease  · low levels of calcium or Vitamin D in the blood  · malnutrition  · on hemodialysis  · skin conditions or sensitivity  · thyroid or parathyroid disease  · an unusual reaction to denosumab, other medicines, foods, dyes, or preservatives  · pregnant or trying to get pregnant  · breast-feeding  How should I use this medicine?  This medicine is for injection under the skin. It is given by a health care professional in a hospital or clinic setting.  A special MedGuide will be given to you before each treatment. Be sure to read this information carefully each time.  For Prolia, talk to your pediatrician regarding the use of this medicine in children. Special care may be needed. For Xgeva, talk to your pediatrician regarding the use of this medicine in children. While this drug may be prescribed for children as young as 13 years for selected conditions, precautions do apply.  Overdosage: If you think you have taken too much of this medicine contact a poison control center or emergency room at once.  NOTE: This medicine is only for you. Do not share this medicine with others.  What if I miss a dose?  It is  important not to miss your dose. Call your doctor or health care professional if you are unable to keep an appointment.  What may interact with this medicine?  Do not take this medicine with any of the following medications:  · other medicines containing denosumab  This medicine may also interact with the following medications:  · medicines that lower your chance of fighting infection  · steroid medicines like prednisone or cortisone  This list may not describe all possible interactions. Give your health care provider a list of all the medicines, herbs, non-prescription drugs, or dietary supplements you use. Also tell them if you smoke, drink alcohol, or use illegal drugs. Some items may interact with your medicine.  What should I watch for while using this medicine?  Visit your doctor or health care professional for regular checks on your progress. Your doctor or health care professional may order blood tests and other tests to see how you are doing.  Call your doctor or health care professional for advice if you get a fever, chills or sore throat, or other symptoms of a cold or flu. Do not treat yourself. This drug may decrease your body's ability to fight infection. Try to avoid being around people who are sick.  You should make sure you get enough calcium and vitamin D while you are taking this medicine, unless your doctor tells you not to. Discuss the foods you eat and the vitamins you take with your health care professional.  See your dentist regularly. Brush and floss your teeth as directed. Before you have any dental work done, tell your dentist you are receiving this medicine.  Do not become pregnant while taking this medicine or for 5 months after stopping it. Talk with your doctor or health care professional about your birth control options while taking this medicine. Women should inform their doctor if they wish to become pregnant or think they might be pregnant. There is a potential for serious side  effects to an unborn child. Talk to your health care professional or pharmacist for more information.  What side effects may I notice from receiving this medicine?  Side effects that you should report to your doctor or health care professional as soon as possible:  · allergic reactions like skin rash, itching or hives, swelling of the face, lips, or tongue  · bone pain  · breathing problems  · dizziness  · jaw pain, especially after dental work  · redness, blistering, peeling of the skin  · signs and symptoms of infection like fever or chills; cough; sore throat; pain or trouble passing urine  · signs of low calcium like fast heartbeat, muscle cramps or muscle pain; pain, tingling, numbness in the hands or feet; seizures  · unusual bleeding or bruising  · unusually weak or tired  Side effects that usually do not require medical attention (report to your doctor or health care professional if they continue or are bothersome):  · constipation  · diarrhea  · headache  · joint pain  · loss of appetite  · muscle pain  · runny nose  · tiredness  · upset stomach  This list may not describe all possible side effects. Call your doctor for medical advice about side effects. You may report side effects to FDA at 5-653-FDA-8853.  Where should I keep my medicine?  This medicine is only given in a clinic, doctor's office, or other health care setting and will not be stored at home.  NOTE: This sheet is a summary. It may not cover all possible information. If you have questions about this medicine, talk to your doctor, pharmacist, or health care provider.  © 2021 [employer]/Gold Standard (2019-04-26 16:10:44)

## 2021-12-14 NOTE — PROGRESS NOTES
Subjective   Dixie Cervantes is a 71 y.o. female.   Chief Complaint   Patient presents with   • Insomnia     3 mo f/u       History of Present Illness   Patient is here for follow up for insomnia, has been on ambien for years, trying to cut back, has a few tabs left every month  Still in boot for right foot; has a left foot accelerator on vehicle so able to drive.   Has stopped alendronate due to stomach upset. Her endo is trying to get insurance approval for prolia    Requests refill of nystatin,, still occ has yeast rash under breasts and skin folds  The following portions of the patient's history were reviewed and updated as appropriate: allergies, current medications, past family history, past medical history, past social history, past surgical history and problem list.    Review of Systems   Constitutional: Negative for chills, fatigue and fever.   Eyes: Negative for visual disturbance.   Respiratory: Negative for shortness of breath.    Cardiovascular: Negative for chest pain and palpitations.   Gastrointestinal: Positive for nausea (occ). Negative for abdominal pain, constipation and diarrhea.   Genitourinary: Negative for difficulty urinating and dysuria.   Musculoskeletal: Positive for arthralgias and back pain.   Neurological: Negative for dizziness, light-headedness and headaches.   Psychiatric/Behavioral: Positive for sleep disturbance (controlled with ambien). Negative for dysphoric mood. The patient is not nervous/anxious (controlled).        Objective   Physical Exam  Vitals reviewed.   Constitutional:       Appearance: Normal appearance. She is normal weight.   HENT:      Head: Normocephalic and atraumatic.   Eyes:      General: No scleral icterus.  Neck:      Vascular: No carotid bruit.   Cardiovascular:      Rate and Rhythm: Normal rate and regular rhythm.      Heart sounds: No murmur heard.      Pulmonary:      Effort: Pulmonary effort is normal. No respiratory distress.      Breath sounds: Normal  "breath sounds. No wheezing or rhonchi.   Musculoskeletal:      Right lower leg: No edema.      Left lower leg: No edema.   Skin:     General: Skin is warm and dry.   Neurological:      Mental Status: She is alert and oriented to person, place, and time.   Psychiatric:         Mood and Affect: Mood normal.         Behavior: Behavior normal.         Thought Content: Thought content normal.         Judgment: Judgment normal.       /82   Pulse 87   Ht 172.7 cm (67.99\")   Wt 75.3 kg (166 lb)   SpO2 97%   BMI 25.25 kg/m²     Assessment/Plan   Diagnoses and all orders for this visit:    1. Skin yeast infection  -     nystatin (MYCOSTATIN) 424776 UNIT/GM cream; Apply  topically to the appropriate area as directed As Needed (yeast rash).  Dispense: 30 g; Refill: 2    2. Primary insomnia  -     zolpidem (AMBIEN) 5 MG tablet; Take 1-2 tablets by mouth At Night As Needed for Sleep. Do not take with alprazolam  Dispense: 50 tablet; Refill: 2          Nystatin refilled for skin yeast  This patient is on a controlled substance which improves symptoms/quality of life and is aware of the risks, benefits and possible side-effects current treatment. The patient denies any medication side-effects at this time. A controlled substance agreement will be obtained or is currently on file. We reviewed required monitoring for controlled substances including but not limited to quarterly follow-up visits, annual depression screening, and urine drug screens to which the patient is agreeable. A [facility] report has been or shortly will be reviewed. There are no signs of deviation or misuse.   Refilled ambien for insomnia, patient will continue to try weaning down on dose         "

## 2022-03-15 ENCOUNTER — TRANSCRIBE ORDERS (OUTPATIENT)
Dept: ADMINISTRATIVE | Facility: HOSPITAL | Age: 72
End: 2022-03-15

## 2022-03-15 DIAGNOSIS — Z12.31 VISIT FOR SCREENING MAMMOGRAM: Primary | ICD-10-CM

## 2022-03-15 RX ORDER — DENOSUMAB 60 MG/ML
INJECTION SUBCUTANEOUS
COMMUNITY

## 2022-03-17 ENCOUNTER — OFFICE VISIT (OUTPATIENT)
Dept: FAMILY MEDICINE CLINIC | Facility: CLINIC | Age: 72
End: 2022-03-17

## 2022-03-17 VITALS
WEIGHT: 167.2 LBS | OXYGEN SATURATION: 97 % | BODY MASS INDEX: 25.34 KG/M2 | DIASTOLIC BLOOD PRESSURE: 88 MMHG | SYSTOLIC BLOOD PRESSURE: 142 MMHG | HEIGHT: 68 IN | HEART RATE: 87 BPM

## 2022-03-17 DIAGNOSIS — S82.64XS CLOSED NONDISPLACED FRACTURE OF LATERAL MALLEOLUS OF RIGHT FIBULA, SEQUELA: ICD-10-CM

## 2022-03-17 DIAGNOSIS — R03.0 ELEVATED BLOOD PRESSURE READING IN OFFICE WITHOUT DIAGNOSIS OF HYPERTENSION: ICD-10-CM

## 2022-03-17 DIAGNOSIS — F51.01 PRIMARY INSOMNIA: Primary | ICD-10-CM

## 2022-03-17 PROCEDURE — 99213 OFFICE O/P EST LOW 20 MIN: CPT | Performed by: NURSE PRACTITIONER

## 2022-03-17 RX ORDER — ZOLPIDEM TARTRATE 5 MG/1
5-10 TABLET ORAL NIGHTLY PRN
Qty: 50 TABLET | Refills: 3 | Status: SHIPPED | OUTPATIENT
Start: 2022-03-17 | End: 2022-07-06

## 2022-03-17 NOTE — PROGRESS NOTES
Subjective   Dixie Cervantes is a 71 y.o. female.   Chief Complaint   Patient presents with   • Insomnia     3 month follow-up       History of Present Illness   Patient is here for follow up for insomnia; still taking ambien, has been able to decrease dose to 5 mg occ; many nights still 10 mg;    Was able to drive today; still in boot for her healing fractured ankle; has osteoporosis, bone is slow to heal.  The following portions of the patient's history were reviewed and updated as appropriate: allergies, current medications, past family history, past medical history, past social history, past surgical history and problem list.    Review of Systems   HENT: Positive for dental problem. Drooling: followed with dentist; root canal.    Respiratory: Positive for chest tightness (occ). Negative for shortness of breath.    Cardiovascular: Negative for chest pain.   Gastrointestinal: Negative for blood in stool, constipation and diarrhea.        Acid reflux   Genitourinary: Negative for difficulty urinating and dysuria.   Musculoskeletal: Positive for arthralgias.   Neurological: Negative for dizziness, light-headedness and headaches.   Psychiatric/Behavioral: Positive for sleep disturbance. Negative for self-injury and suicidal ideas. The patient is nervous/anxious (occ).        Objective   Physical Exam  Vitals reviewed.   Constitutional:       General: She is not in acute distress.     Appearance: Normal appearance. She is not ill-appearing, toxic-appearing or diaphoretic.   HENT:      Head: Normocephalic and atraumatic.   Neck:      Vascular: No carotid bruit.   Cardiovascular:      Rate and Rhythm: Normal rate and regular rhythm.      Heart sounds: Normal heart sounds.   Pulmonary:      Effort: Pulmonary effort is normal.      Breath sounds: Normal breath sounds. No wheezing or rhonchi.   Skin:     General: Skin is warm and dry.   Neurological:      Mental Status: She is alert and oriented to person, place, and time.  "  Psychiatric:         Mood and Affect: Mood normal.         Thought Content: Thought content normal.       /88   Pulse 87   Ht 172.7 cm (67.99\")   Wt 75.8 kg (167 lb 3.2 oz)   SpO2 97%   BMI 25.43 kg/m²     Assessment/Plan   Diagnoses and all orders for this visit:    1. Primary insomnia (Primary)  -     zolpidem (AMBIEN) 5 MG tablet; Take 1-2 tablets by mouth At Night As Needed for Sleep. Do not take with alprazolam  Dispense: 50 tablet; Refill: 3    2. Closed nondisplaced fracture of lateral malleolus of right fibula, sequela    3. Elevated blood pressure reading in office without diagnosis of hypertension    This patient is on a controlled substance which improves symptoms/quality of life and is aware of the risks, benefits and possible side-effects current treatment. The patient denies any medication side-effects at this time. A controlled substance agreement will be obtained or is currently on file. We reviewed required monitoring for controlled substances including but not limited to quarterly follow-up visits, annual depression screening, and urine drug screens to which the patient is agreeable. A [facility] report has been or shortly will be reviewed. There are no signs of deviation or misuse.   Continue ambien for insomnia, continue to try to wean down on dose  Followed by ortho and endo for osteoporosis; will continue to wear boot for stability  BP elevated in office, will monitor at home  Patient was encouraged to keep me informed of any acute changes, lack of improvement, or any new concerning symptoms.         "

## 2022-04-04 ENCOUNTER — OFFICE VISIT (OUTPATIENT)
Dept: FAMILY MEDICINE CLINIC | Facility: CLINIC | Age: 72
End: 2022-04-04

## 2022-04-04 VITALS
DIASTOLIC BLOOD PRESSURE: 88 MMHG | OXYGEN SATURATION: 97 % | HEIGHT: 68 IN | SYSTOLIC BLOOD PRESSURE: 142 MMHG | BODY MASS INDEX: 25.39 KG/M2 | HEART RATE: 88 BPM | WEIGHT: 167.5 LBS

## 2022-04-04 DIAGNOSIS — R07.89 CHEST TIGHTNESS: ICD-10-CM

## 2022-04-04 DIAGNOSIS — F41.9 ANXIETY: ICD-10-CM

## 2022-04-04 DIAGNOSIS — I10 PRIMARY HYPERTENSION: Primary | ICD-10-CM

## 2022-04-04 PROCEDURE — 99214 OFFICE O/P EST MOD 30 MIN: CPT | Performed by: NURSE PRACTITIONER

## 2022-04-04 RX ORDER — LOSARTAN POTASSIUM 25 MG/1
25 TABLET ORAL DAILY
Qty: 90 TABLET | Refills: 1 | Status: SHIPPED | OUTPATIENT
Start: 2022-04-04 | End: 2022-08-04 | Stop reason: SDUPTHER

## 2022-04-04 NOTE — PROGRESS NOTES
"Subjective   Dixie Cervantes is a 71 y.o. female.   Chief Complaint   Patient presents with   • Hypertension     Pt states bp readings have been staying high since the last office visit. Pt states has been feeling very anxious lately.        History of Present Illness   Patient is here with complaint of elevated BP, states she just doesn't \"feel right\". BP readings have been elevated at home, increased anxiety. Has had some chest tightness, no pain    The following portions of the patient's history were reviewed and updated as appropriate: allergies, current medications, past family history, past medical history, past social history, past surgical history and problem list.    Review of Systems   Constitutional: Negative for chills, fatigue and fever.   Eyes: Negative for visual disturbance.   Respiratory: Positive for chest tightness and shortness of breath (\"couple of times\"). Negative for wheezing.    Cardiovascular: Positive for palpitations. Negative for chest pain.   Genitourinary: Negative for difficulty urinating and dysuria.   Neurological: Positive for headaches (band across top of head). Negative for dizziness and light-headedness.   Psychiatric/Behavioral: The patient is nervous/anxious.        Objective   Physical Exam  Vitals reviewed.   Constitutional:       General: She is not in acute distress.     Appearance: Normal appearance. She is not ill-appearing, toxic-appearing or diaphoretic.   HENT:      Head: Normocephalic and atraumatic.   Neck:      Vascular: No carotid bruit.   Cardiovascular:      Rate and Rhythm: Normal rate and regular rhythm.      Heart sounds: Normal heart sounds.   Pulmonary:      Effort: Pulmonary effort is normal.      Breath sounds: Normal breath sounds. No wheezing or rhonchi.   Skin:     General: Skin is warm and dry.   Neurological:      Mental Status: She is alert and oriented to person, place, and time.   Psychiatric:         Mood and Affect: Mood normal.         Thought " "Content: Thought content normal.       /88   Pulse 88   Ht 172.7 cm (67.99\")   Wt 76 kg (167 lb 8 oz)   SpO2 97%   BMI 25.47 kg/m²     Assessment/Plan   Diagnoses and all orders for this visit:    1. Primary hypertension (Primary)  -     losartan (Cozaar) 25 MG tablet; Take 1 tablet by mouth Daily.  Dispense: 90 tablet; Refill: 1  -     Basic Metabolic Panel; Future    2. Chest tightness  -     ECG 12 Lead    3. Anxiety        ECG 12 Lead    Date/Time: 4/5/2022 7:46 AM  Performed by: Linda Adhikari APRN  Authorized by: Linda Adhikari APRN   Comparison: not compared with previous ECG   Rhythm: sinus rhythm  Rate: normal  BPM: 79  Conduction: conduction normal  ST Segments: ST segments normal  QRS axis: normal    Clinical impression: normal ECG        BP elevated, start losartan for HTN; DASH diet recommended; try to avoid high anxiety situations  ECG in office is normal today, call with BP readings on Friday  Patient was encouraged to keep me informed of any acute changes, lack of improvement, or any new concerning symptoms.  OK to take prn xanax if anxiety is increased   I spent a total of 40 minutes before, during and after the visit reviewing records, updating history and care gaps, educating the patient about his/her condition, ordering labs and prescriptions.            "

## 2022-04-05 PROCEDURE — 93000 ELECTROCARDIOGRAM COMPLETE: CPT | Performed by: NURSE PRACTITIONER

## 2022-04-08 ENCOUNTER — TELEPHONE (OUTPATIENT)
Dept: FAMILY MEDICINE CLINIC | Facility: CLINIC | Age: 72
End: 2022-04-08

## 2022-04-08 NOTE — TELEPHONE ENCOUNTER
Caller: Dixie Cervantes    Relationship: Self    Best call back number: [phone] (H)    PATIENT CALLED AND STATED SHE WAS STATED ON LOSARTAN Tuesday OF THIS WEEK. WAS TOLD TO CALL IN WITH HER BLOOD PRESSURE READINGS     04/05/22- 135/86 9:00AM  04/05/22- 145/84 3:00PM    04/06/22- 138/86 2:30PM     04/07/22- 125/84 10:00AM   IN THE AFTERNOON AT 3:30 PATIENTS HEART STATED TO RASE AND HER BLOOD PRESSURE /95  AT RESTING.     04/07/22 134/89  4:00    04/08/22- 123/82 10:00AM     PLEASE ADVISE

## 2022-04-08 NOTE — TELEPHONE ENCOUNTER
Continue current losartan dose, send BP and HR readings again next week. When HR is greater than 100 try slow deep breathing and recheck. As long as HR is not staying above 100 I am not concerned

## 2022-04-08 NOTE — TELEPHONE ENCOUNTER
Contacted patient and relayed message. She verbalized understanding and agreed to follow up next Friday with further readings.

## 2022-04-15 ENCOUNTER — TELEPHONE (OUTPATIENT)
Dept: FAMILY MEDICINE CLINIC | Facility: CLINIC | Age: 72
End: 2022-04-15

## 2022-04-15 NOTE — TELEPHONE ENCOUNTER
Contacted patient and relayed message, she verbalized understanding and agreed to follow up next Monday. She reports that her dizzy episode occurred once and she was feeling very shaky. She will keep us updated if this occurs again

## 2022-04-15 NOTE — TELEPHONE ENCOUNTER
"Attempted to contact patient. No answer; Left voicemail for patient to return call with office number given.Hub ok to relay the following message to patient and document, thanks  \"Blood pressure readings are good. Continue Losartan. Avoid sudden position changes to reduce dizziness. Please call next Friday 4/22/22 with an update on dizziness (better or worse) and blood pressure.\"  "

## 2022-04-15 NOTE — TELEPHONE ENCOUNTER
Blood pressure readings are good, continue losartan, avoid sudden position changes to reduce dizziness; call next Friday with update on dizziness (better or worse) and BP

## 2022-04-15 NOTE — TELEPHONE ENCOUNTER
THE PATIENT STATES THAT SHE IS SUPPOSED TO CALL IN HER BLOOD PRESSURE READING FOR THE WEEK     04/09/2022- MORNING 112/85, AFTERNOON -124/82    04/10/2022- PATIENT STATES THAT SHE WAS SHAKY IN THE MORNING BUT DID NOT TAKE HER BLOOD PRESSURE READING UNTIL 1:00 IN THE AFTERNOON IT /81    04/11/2022-PATIENT STATES THAT SHE WAS SHAKY AND DIZZY IN THE MORNING AT 10:00 AM BLOOD PRESSURE /82 AND THEN SHE TOOK IT AT 11:30 IN THE MORNING AND IT /77    04/12/2022- PATIENT STATES THAT SHE SHE WAS SHAKY AND DIZZY THAT MORNING SHE DID DRINK COFFEE THAT MORNING SHE TOOK HER BLOOD PRESSURE AT 11:30 AM IT /87    04/13/2022- THE PATIENT TOOK HER BLOOD PRESSURE AT 5:00 PM THE READING /88    04/14/2022- THE PATIENT STATES THAT SHE WAS SHAKY EARLY THAT MORNING SHE DID DRINK COFFEE LATER THAT MORNING. THE PATIENT TOOK HER BLOOD PRESSURE TWICE CONCECUTIVLY AT 8:00PM THE READING /83 THE FIRST TIME THEN THE SECOND TIME SHE TOOK IT IT /80    04/15/2022 PATIENT STATES THAT SHE HAS NOT TAKEN HER BLOOD PRESSURE READING YET TODAY       PATIENT CALL BACK NUMBER [phone]

## 2022-04-22 ENCOUNTER — TELEPHONE (OUTPATIENT)
Dept: FAMILY MEDICINE CLINIC | Facility: CLINIC | Age: 72
End: 2022-04-22

## 2022-04-22 NOTE — TELEPHONE ENCOUNTER
THE PATIENT STATES THAT SHE IS SUPPOSED TO CALL IN HER BLOOD PRESSURE READING FOR THE WEEK     LAST Friday AFTERNOON AFTER 5 148/87 140/81  SATURDAY AT 3PM 118/79  04/17 AT 1PM 140/86 123/87  Monday 04/18 AT NOON 122/85  04/19 AT 8AM 128/82  04/20 AT 2PM 124/90  04/21 AT 1:30PM 196/90 128/81

## 2022-04-22 NOTE — TELEPHONE ENCOUNTER
Contacted patient to discuss further. She is not certain what caused BP spike yesterday. She reports that she was a bit more active yesterday, she had been cleaning her pool and that may have made the difference.     She wants to try half decaf, half caffeinated coffee. I advised her to monitor her BP and if she notices higher trending numbers that it is probably best to stick with decaf. She verbalized understanding and agreed

## 2022-05-05 ENCOUNTER — LAB (OUTPATIENT)
Dept: LAB | Facility: HOSPITAL | Age: 72
End: 2022-05-05

## 2022-05-05 ENCOUNTER — OFFICE VISIT (OUTPATIENT)
Dept: FAMILY MEDICINE CLINIC | Facility: CLINIC | Age: 72
End: 2022-05-05

## 2022-05-05 VITALS
BODY MASS INDEX: 25.37 KG/M2 | OXYGEN SATURATION: 98 % | HEART RATE: 85 BPM | DIASTOLIC BLOOD PRESSURE: 76 MMHG | WEIGHT: 167.4 LBS | HEIGHT: 68 IN | SYSTOLIC BLOOD PRESSURE: 138 MMHG

## 2022-05-05 DIAGNOSIS — F41.9 ANXIETY: ICD-10-CM

## 2022-05-05 DIAGNOSIS — I10 PRIMARY HYPERTENSION: ICD-10-CM

## 2022-05-05 DIAGNOSIS — F51.01 PRIMARY INSOMNIA: ICD-10-CM

## 2022-05-05 DIAGNOSIS — I10 PRIMARY HYPERTENSION: Primary | ICD-10-CM

## 2022-05-05 LAB
ANION GAP SERPL CALCULATED.3IONS-SCNC: 12.1 MMOL/L (ref 5–15)
BUN SERPL-MCNC: 15 MG/DL (ref 8–23)
BUN/CREAT SERPL: 17.4 (ref 7–25)
CALCIUM SPEC-SCNC: 9.8 MG/DL (ref 8.6–10.5)
CHLORIDE SERPL-SCNC: 105 MMOL/L (ref 98–107)
CO2 SERPL-SCNC: 24.9 MMOL/L (ref 22–29)
CREAT SERPL-MCNC: 0.86 MG/DL (ref 0.57–1)
EGFRCR SERPLBLD CKD-EPI 2021: 71.9 ML/MIN/1.73
GLUCOSE SERPL-MCNC: 137 MG/DL (ref 65–99)
POTASSIUM SERPL-SCNC: 4.6 MMOL/L (ref 3.5–5.2)
SODIUM SERPL-SCNC: 142 MMOL/L (ref 136–145)

## 2022-05-05 PROCEDURE — 99214 OFFICE O/P EST MOD 30 MIN: CPT | Performed by: NURSE PRACTITIONER

## 2022-05-05 PROCEDURE — 80048 BASIC METABOLIC PNL TOTAL CA: CPT

## 2022-05-05 NOTE — PROGRESS NOTES
Subjective   Dixie Cervantes is a 71 y.o. female.   Chief Complaint   Patient presents with   • Hypertension     1 month f/u. Pt states Monday started taking half of a losartan due to her feeling very tired. Pt states has been feeling better since cutting down.        History of Present Illness   Patient is here for follow up for HTN, was started on losartan 25 mg 1 month ago; BP at home 120's-130's overall; cut back to 1/2 tablet one week ago because was feeling more tired than usual and having morning nausea; BP in 110's this week and tired feeling resolved  Has needed to take ambien more often recently, she is taking care of her 7 yo granddaughter for awhile  The following portions of the patient's history were reviewed and updated as appropriate: allergies, current medications, past family history, past medical history, past social history, past surgical history and problem list.    Review of Systems   Constitutional: Negative for chills, fatigue and fever.   Eyes: Negative for visual disturbance.   Respiratory: Negative for chest tightness, shortness of breath and wheezing.    Cardiovascular: Positive for palpitations ( rare this month). Negative for chest pain.   Gastrointestinal: Positive for nausea (in the morning, but better with 1/2 losartan).   Genitourinary: Negative for difficulty urinating and dysuria.   Neurological: Negative for dizziness, light-headedness and headaches.   Psychiatric/Behavioral: The patient is nervous/anxious.        Objective   Physical Exam  Vitals reviewed.   Constitutional:       General: She is not in acute distress.     Appearance: Normal appearance. She is not ill-appearing, toxic-appearing or diaphoretic.   HENT:      Head: Normocephalic and atraumatic.   Neck:      Vascular: No carotid bruit.   Cardiovascular:      Rate and Rhythm: Normal rate and regular rhythm.      Heart sounds: Normal heart sounds.   Pulmonary:      Effort: Pulmonary effort is normal.      Breath sounds:  "Normal breath sounds. No wheezing or rhonchi.   Skin:     General: Skin is warm and dry.   Neurological:      Mental Status: She is alert and oriented to person, place, and time.   Psychiatric:         Mood and Affect: Mood is anxious.         Thought Content: Thought content normal.       /76   Pulse 85   Ht 172.7 cm (67.99\")   Wt 75.9 kg (167 lb 6.4 oz)   SpO2 98%   BMI 25.46 kg/m²     Assessment/Plan   Diagnoses and all orders for this visit:    1. Primary hypertension (Primary)    2. Anxiety    3. Primary insomnia        HTN is controlled at home, BP elevated in office, goal 130/80 or less. OK to stay on 1/2 losartan if home BP at goal  Anxiety is stable, has xanax prn  Insomnia is controlled with ambien  This patient is on a controlled substance which improves symptoms/quality of life and is aware of the risks, benefits and possible side-effects current treatment. The patient denies any medication side-effects at this time. A controlled substance agreement will be obtained or is currently on file. We reviewed required monitoring for controlled substances including but not limited to quarterly follow-up visits, annual depression screening, and urine drug screens to which the patient is agreeable. A MADY report has been or shortly will be reviewed. There are no signs of deviation or misuse.   Patient was encouraged to keep me informed of any acute changes, lack of improvement, or any new concerning symptoms.         "

## 2022-06-01 ENCOUNTER — APPOINTMENT (OUTPATIENT)
Dept: OTHER | Facility: HOSPITAL | Age: 72
End: 2022-06-01

## 2022-06-01 ENCOUNTER — HOSPITAL ENCOUNTER (OUTPATIENT)
Dept: MAMMOGRAPHY | Facility: HOSPITAL | Age: 72
Discharge: HOME | End: 2022-06-01
Admitting: NURSE PRACTITIONER

## 2022-06-01 DIAGNOSIS — Z12.31 VISIT FOR SCREENING MAMMOGRAM: ICD-10-CM

## 2022-06-01 PROCEDURE — 77063 BREAST TOMOSYNTHESIS BI: CPT | Performed by: RADIOLOGY

## 2022-06-01 PROCEDURE — 77063 BREAST TOMOSYNTHESIS BI: CPT

## 2022-06-01 PROCEDURE — 77067 SCR MAMMO BI INCL CAD: CPT

## 2022-06-01 PROCEDURE — 77067 SCR MAMMO BI INCL CAD: CPT | Performed by: RADIOLOGY

## 2022-07-05 DIAGNOSIS — F51.01 PRIMARY INSOMNIA: ICD-10-CM

## 2022-07-06 RX ORDER — ZOLPIDEM TARTRATE 5 MG/1
TABLET ORAL
Qty: 50 TABLET | Refills: 0 | Status: SHIPPED | OUTPATIENT
Start: 2022-07-10 | End: 2022-08-04 | Stop reason: SDUPTHER

## 2022-07-06 NOTE — TELEPHONE ENCOUNTER
Rx Refill Note  Requested Prescriptions     Pending Prescriptions Disp Refills   • zolpidem (AMBIEN) 5 MG tablet [Pharmacy Med Name: ZOLPIDEM 5MG TABLETS] 50 tablet      Sig: TAKE 1 TO 2 TABLETS BY MOUTH AT NIGHT AS NEEDED FOR SLEEP. DO NOT TAKE WITH ALPRAZOLAM      Last office visit with prescribing clinician: 5/5/2022      Next office visit with prescribing clinician: 8/4/2022            Maged Maxwell MA  07/06/22, 09:09 EDT     CSA 9/14/21  UDS 6/14/21

## 2022-07-22 ENCOUNTER — TELEPHONE (OUTPATIENT)
Dept: FAMILY MEDICINE CLINIC | Facility: CLINIC | Age: 72
End: 2022-07-22

## 2022-07-22 DIAGNOSIS — Z13.29 THYROID DISORDER SCREENING: ICD-10-CM

## 2022-07-22 DIAGNOSIS — I10 PRIMARY HYPERTENSION: Primary | ICD-10-CM

## 2022-07-22 DIAGNOSIS — R73.03 PREDIABETES: ICD-10-CM

## 2022-07-22 DIAGNOSIS — E55.9 VITAMIN D DEFICIENCY: ICD-10-CM

## 2022-07-22 DIAGNOSIS — E78.5 DYSLIPIDEMIA: ICD-10-CM

## 2022-07-22 DIAGNOSIS — K21.9 GASTROESOPHAGEAL REFLUX DISEASE WITHOUT ESOPHAGITIS: ICD-10-CM

## 2022-07-26 ENCOUNTER — LAB (OUTPATIENT)
Dept: LAB | Facility: HOSPITAL | Age: 72
End: 2022-07-26

## 2022-07-26 DIAGNOSIS — Z13.29 THYROID DISORDER SCREENING: ICD-10-CM

## 2022-07-26 DIAGNOSIS — K21.9 GASTROESOPHAGEAL REFLUX DISEASE WITHOUT ESOPHAGITIS: ICD-10-CM

## 2022-07-26 DIAGNOSIS — E78.5 DYSLIPIDEMIA: ICD-10-CM

## 2022-07-26 DIAGNOSIS — E55.9 VITAMIN D DEFICIENCY: ICD-10-CM

## 2022-07-26 DIAGNOSIS — R73.03 PREDIABETES: ICD-10-CM

## 2022-07-26 LAB
25(OH)D3 SERPL-MCNC: 43.2 NG/ML (ref 30–100)
ALBUMIN SERPL-MCNC: 4.5 G/DL (ref 3.5–5.2)
ALBUMIN/GLOB SERPL: 1.9 G/DL
ALP SERPL-CCNC: 49 U/L (ref 39–117)
ALT SERPL W P-5'-P-CCNC: 18 U/L (ref 1–33)
ANION GAP SERPL CALCULATED.3IONS-SCNC: 13.1 MMOL/L (ref 5–15)
AST SERPL-CCNC: 16 U/L (ref 1–32)
BASOPHILS # BLD AUTO: 0.02 10*3/MM3 (ref 0–0.2)
BASOPHILS NFR BLD AUTO: 0.3 % (ref 0–1.5)
BILIRUB SERPL-MCNC: 0.4 MG/DL (ref 0–1.2)
BUN SERPL-MCNC: 15 MG/DL (ref 8–23)
BUN/CREAT SERPL: 17.6 (ref 7–25)
CALCIUM SPEC-SCNC: 9.9 MG/DL (ref 8.6–10.5)
CHLORIDE SERPL-SCNC: 101 MMOL/L (ref 98–107)
CHOLEST SERPL-MCNC: 206 MG/DL (ref 0–200)
CO2 SERPL-SCNC: 24.9 MMOL/L (ref 22–29)
CREAT SERPL-MCNC: 0.85 MG/DL (ref 0.57–1)
DEPRECATED RDW RBC AUTO: 39.9 FL (ref 37–54)
EGFRCR SERPLBLD CKD-EPI 2021: 72.9 ML/MIN/1.73
EOSINOPHIL # BLD AUTO: 0.13 10*3/MM3 (ref 0–0.4)
EOSINOPHIL NFR BLD AUTO: 1.8 % (ref 0.3–6.2)
ERYTHROCYTE [DISTWIDTH] IN BLOOD BY AUTOMATED COUNT: 12.2 % (ref 12.3–15.4)
GLOBULIN UR ELPH-MCNC: 2.4 GM/DL
GLUCOSE SERPL-MCNC: 119 MG/DL (ref 65–99)
HBA1C MFR BLD: 6.1 % (ref 4.8–5.6)
HCT VFR BLD AUTO: 43 % (ref 34–46.6)
HDLC SERPL-MCNC: 49 MG/DL (ref 40–60)
HGB BLD-MCNC: 14.4 G/DL (ref 12–15.9)
IMM GRANULOCYTES # BLD AUTO: 0.03 10*3/MM3 (ref 0–0.05)
IMM GRANULOCYTES NFR BLD AUTO: 0.4 % (ref 0–0.5)
LDLC SERPL CALC-MCNC: 128 MG/DL (ref 0–100)
LDLC/HDLC SERPL: 2.54 {RATIO}
LYMPHOCYTES # BLD AUTO: 2.46 10*3/MM3 (ref 0.7–3.1)
LYMPHOCYTES NFR BLD AUTO: 33.8 % (ref 19.6–45.3)
MCH RBC QN AUTO: 29.9 PG (ref 26.6–33)
MCHC RBC AUTO-ENTMCNC: 33.5 G/DL (ref 31.5–35.7)
MCV RBC AUTO: 89.4 FL (ref 79–97)
MONOCYTES # BLD AUTO: 0.66 10*3/MM3 (ref 0.1–0.9)
MONOCYTES NFR BLD AUTO: 9.1 % (ref 5–12)
NEUTROPHILS NFR BLD AUTO: 3.98 10*3/MM3 (ref 1.7–7)
NEUTROPHILS NFR BLD AUTO: 54.6 % (ref 42.7–76)
NRBC BLD AUTO-RTO: 0 /100 WBC (ref 0–0.2)
PLATELET # BLD AUTO: 257 10*3/MM3 (ref 140–450)
PMV BLD AUTO: 10.2 FL (ref 6–12)
POTASSIUM SERPL-SCNC: 4.2 MMOL/L (ref 3.5–5.2)
PROT SERPL-MCNC: 6.9 G/DL (ref 6–8.5)
RBC # BLD AUTO: 4.81 10*6/MM3 (ref 3.77–5.28)
SODIUM SERPL-SCNC: 139 MMOL/L (ref 136–145)
TRIGL SERPL-MCNC: 163 MG/DL (ref 0–150)
TSH SERPL DL<=0.05 MIU/L-ACNC: 3.11 UIU/ML (ref 0.27–4.2)
VLDLC SERPL-MCNC: 29 MG/DL (ref 5–40)
WBC NRBC COR # BLD: 7.28 10*3/MM3 (ref 3.4–10.8)

## 2022-07-26 PROCEDURE — 84443 ASSAY THYROID STIM HORMONE: CPT

## 2022-07-26 PROCEDURE — 82306 VITAMIN D 25 HYDROXY: CPT

## 2022-07-26 PROCEDURE — 80061 LIPID PANEL: CPT

## 2022-07-26 PROCEDURE — 83036 HEMOGLOBIN GLYCOSYLATED A1C: CPT

## 2022-07-26 PROCEDURE — 85025 COMPLETE CBC W/AUTO DIFF WBC: CPT

## 2022-07-26 PROCEDURE — 80053 COMPREHEN METABOLIC PANEL: CPT

## 2022-08-04 ENCOUNTER — OFFICE VISIT (OUTPATIENT)
Dept: FAMILY MEDICINE CLINIC | Facility: CLINIC | Age: 72
End: 2022-08-04

## 2022-08-04 VITALS
BODY MASS INDEX: 24.71 KG/M2 | DIASTOLIC BLOOD PRESSURE: 70 MMHG | OXYGEN SATURATION: 98 % | HEART RATE: 101 BPM | SYSTOLIC BLOOD PRESSURE: 126 MMHG | WEIGHT: 163 LBS | TEMPERATURE: 99.4 F | HEIGHT: 68 IN | RESPIRATION RATE: 14 BRPM

## 2022-08-04 DIAGNOSIS — E78.2 MIXED HYPERLIPIDEMIA: ICD-10-CM

## 2022-08-04 DIAGNOSIS — Z00.00 MEDICARE ANNUAL WELLNESS VISIT, SUBSEQUENT: Primary | ICD-10-CM

## 2022-08-04 DIAGNOSIS — F41.9 ANXIETY: ICD-10-CM

## 2022-08-04 DIAGNOSIS — I10 PRIMARY HYPERTENSION: ICD-10-CM

## 2022-08-04 DIAGNOSIS — F51.01 PRIMARY INSOMNIA: ICD-10-CM

## 2022-08-04 DIAGNOSIS — R73.03 PREDIABETES: ICD-10-CM

## 2022-08-04 DIAGNOSIS — Z51.81 THERAPEUTIC DRUG MONITORING: ICD-10-CM

## 2022-08-04 DIAGNOSIS — R05.8 ALLERGIC COUGH: ICD-10-CM

## 2022-08-04 PROCEDURE — 1126F AMNT PAIN NOTED NONE PRSNT: CPT | Performed by: NURSE PRACTITIONER

## 2022-08-04 PROCEDURE — G0439 PPPS, SUBSEQ VISIT: HCPCS | Performed by: NURSE PRACTITIONER

## 2022-08-04 PROCEDURE — 1170F FXNL STATUS ASSESSED: CPT | Performed by: NURSE PRACTITIONER

## 2022-08-04 PROCEDURE — 1159F MED LIST DOCD IN RCRD: CPT | Performed by: NURSE PRACTITIONER

## 2022-08-04 RX ORDER — ROSUVASTATIN CALCIUM 10 MG/1
10 TABLET, COATED ORAL NIGHTLY
Qty: 90 TABLET | Refills: 1 | Status: SHIPPED | OUTPATIENT
Start: 2022-08-04 | End: 2022-11-04 | Stop reason: SDUPTHER

## 2022-08-04 RX ORDER — ALPRAZOLAM 0.25 MG/1
0.12 TABLET ORAL DAILY PRN
Qty: 15 TABLET | Refills: 0 | Status: SHIPPED | OUTPATIENT
Start: 2022-08-04 | End: 2023-02-06 | Stop reason: SDUPTHER

## 2022-08-04 RX ORDER — ZOLPIDEM TARTRATE 5 MG/1
5-10 TABLET ORAL NIGHTLY PRN
Qty: 50 TABLET | Refills: 2 | Status: SHIPPED | OUTPATIENT
Start: 2022-08-04 | End: 2022-11-04 | Stop reason: SDUPTHER

## 2022-08-04 RX ORDER — LOSARTAN POTASSIUM 25 MG/1
25 TABLET ORAL DAILY
Qty: 90 TABLET | Refills: 3 | Status: SHIPPED | OUTPATIENT
Start: 2022-08-04 | End: 2022-11-04 | Stop reason: SDUPTHER

## 2022-08-04 RX ORDER — LORATADINE 10 MG/1
10 TABLET ORAL DAILY
Qty: 90 TABLET | Refills: 3 | Status: SHIPPED | OUTPATIENT
Start: 2022-08-04 | End: 2022-11-04

## 2022-08-04 NOTE — PROGRESS NOTES
The ABCs of the Annual Wellness Visit  Subsequent Medicare Wellness Visit    Chief Complaint   Patient presents with   • Medicare Wellness-subsequent      Subjective    History of Present Illness:  Dixie Cervantes is a 71 y.o. female who presents for a Subsequent Medicare Wellness Visit.   is on Prolia for osteoporosis, still wears boot when going out for right foot fx.  Swimming a lot this year, having some rt ear pain.  Continues to take Ambien for insomnia, has tried to wean down to 5 mg, but most of the time needs to take 10 mg.  Takes Xanax half tab only when significant anxiety is present.  The following portions of the patient's history were reviewed and   updated as appropriate: allergies, current medications, past family history, past medical history, past social history, past surgical history and problem list.    Compared to one year ago, the patient feels her physical   health is better. More active    Compared to one year ago, the patient feels her mental   health is the same.    Recent Hospitalizations:  She was not admitted to the hospital during the last year.       Current Medical Providers:  Patient Care Team:  Linda Adhikari APRN as PCP - General (Nurse Practitioner)  Camden Franco MD (Neurosurgery)  Jason Pino MD as Consulting Physician (Pulmonary Disease)  Fady Lane MD as Consulting Physician (Otolaryngology)  Clive Chin (Endocrinology)    Outpatient Medications Prior to Visit   Medication Sig Dispense Refill   • denosumab (Prolia) 60 MG/ML solution prefilled syringe syringe Prolia 60 mg/mL subcutaneous syringe   60 mg subcutaneous every 6 months     • diclofenac (VOLTAREN) 1 % gel gel As Needed.     • glucosamine-chondroitin 500-400 MG capsule capsule Take 1 capsule by mouth 3 (Three) Times a Day With Meals.     • Lactobacillus (PROBIOTIC ACIDOPHILUS PO) Take 1 tablet by mouth Daily.     • Multiple Vitamins-Minerals (MULTI VITAMIN/MINERALS)  tablet Take 1 tablet by mouth.     • nystatin (MYCOSTATIN) 260444 UNIT/GM cream Apply  topically to the appropriate area as directed As Needed (yeast rash). 30 g 2   • ALPRAZolam (XANAX) 0.25 MG tablet Take 0.5 tablets by mouth Daily As Needed for Anxiety. 15 tablet 0   • losartan (Cozaar) 25 MG tablet Take 1 tablet by mouth Daily. 90 tablet 1   • zolpidem (AMBIEN) 5 MG tablet TAKE 1 TO 2 TABLETS BY MOUTH AT NIGHT AS NEEDED FOR SLEEP. DO NOT TAKE WITH ALPRAZOLAM 50 tablet 0     No facility-administered medications prior to visit.       No opioid medication identified on active medication list. I have reviewed chart for other potential  high risk medication/s and harmful drug interactions in the elderly.          Aspirin is not on active medication list.  Aspirin use is not indicated based on review of current medical condition/s. Risk of harm outweighs potential benefits.  .    Patient Active Problem List   Diagnosis   • Vestibular vertigo   • Urinary, incontinence, stress female   • Parasitic infection   • Irritable bowel syndrome (IBS)   • Inverted nipple   • Insomnia   • History of bladder infections   • GERD (gastroesophageal reflux disease)   • OCTAVIO (generalized anxiety disorder)   • External otitis   • Esophageal spasm   • Diverticulosis   • Chronic low back pain   • Chronic cough   • Age related osteoporosis   • Closed nondisplaced fracture of lateral malleolus of right fibula   • Anxiety   • Primary hypertension   • Dyslipidemia   • Prediabetes   • Mixed hyperlipidemia     Advance Care Planning  Advance Directive is on file.  ACP discussion was held with the patient during this visit. Patient has an advance directive in EMR which is still valid.     Review of Systems   Constitutional: Negative for appetite change, chills, diaphoresis, fatigue and fever.   HENT: Positive for ear pain (right), hearing loss (some) and postnasal drip. Negative for congestion.    Eyes: Positive for visual disturbance (left  "cataract; ocular migraine occ). Negative for photophobia, pain, redness and itching.   Respiratory: Positive for cough ( morning) and shortness of breath (with exertion). Negative for apnea and wheezing.    Cardiovascular: Negative for chest pain, palpitations and leg swelling.   Gastrointestinal: Negative for abdominal pain, blood in stool, constipation, diarrhea (occ loose stools), nausea and vomiting.   Endocrine: Positive for polydipsia. Negative for cold intolerance, heat intolerance and polyuria.   Genitourinary: Positive for frequency. Negative for difficulty urinating, dysuria and vaginal bleeding.   Musculoskeletal: Positive for arthralgias (worse in hands) and back pain (occ).   Skin: Negative for color change, pallor, rash and wound.        Right ear lesion; sees derm   Allergic/Immunologic: Positive for environmental allergies.   Neurological: Positive for dizziness (occ vertigo) and numbness (occ in right leg and left elbow). Negative for tremors, seizures and syncope.   Psychiatric/Behavioral: Positive for sleep disturbance (ambien helps). Negative for dysphoric mood, self-injury and suicidal ideas. The patient is nervous/anxious.         Objective    Vitals:    08/04/22 1010   BP: 126/70   Pulse: 101   Resp: 14   Temp: 99.4 °F (37.4 °C)   SpO2: 98%   Weight: 73.9 kg (163 lb)   Height: 172.7 cm (67.99\")   PainSc: 0-No pain     Estimated body mass index is 24.79 kg/m² as calculated from the following:    Height as of this encounter: 172.7 cm (67.99\").    Weight as of this encounter: 73.9 kg (163 lb).    BMI is within normal parameters. No other follow-up for BMI required.      Does the patient have evidence of cognitive impairment? No    Physical Exam  Vitals and nursing note reviewed.   Constitutional:       General: She is not in acute distress.     Appearance: Normal appearance. She is well-developed. She is not diaphoretic.   HENT:      Head: Normocephalic and atraumatic.      Right Ear: Tympanic " membrane and external ear normal.      Left Ear: Tympanic membrane and external ear normal.      Ears:        Nose: Nose normal.   Eyes:      General: No scleral icterus.        Right eye: No discharge.         Left eye: No discharge.      Conjunctiva/sclera: Conjunctivae normal.      Pupils: Pupils are equal, round, and reactive to light.   Neck:      Thyroid: No thyromegaly.      Vascular: No carotid bruit or JVD.      Trachea: No tracheal deviation.   Cardiovascular:      Rate and Rhythm: Normal rate and regular rhythm.      Heart sounds: Normal heart sounds. No murmur heard.    No friction rub. No gallop.   Pulmonary:      Effort: Pulmonary effort is normal. No respiratory distress.      Breath sounds: Normal breath sounds. No stridor. No wheezing or rales.   Chest:      Chest wall: No tenderness.   Breasts:      Right: No swelling, bleeding, inverted nipple, mass, nipple discharge, skin change, tenderness, axillary adenopathy or supraclavicular adenopathy.      Left: Inverted nipple (this is chronic finding) present. No swelling, bleeding, mass, nipple discharge, skin change, tenderness, axillary adenopathy or supraclavicular adenopathy.       Abdominal:      General: Bowel sounds are normal. There is no distension.      Palpations: Abdomen is soft. There is no mass.      Tenderness: There is no abdominal tenderness. There is no guarding or rebound.      Hernia: No hernia is present.   Musculoskeletal:         General: No tenderness. Normal range of motion.      Cervical back: Normal range of motion and neck supple.      Comments: Right leg in ortho boot   Lymphadenopathy:      Cervical: No cervical adenopathy.      Upper Body:      Right upper body: No supraclavicular, axillary or pectoral adenopathy.      Left upper body: No supraclavicular, axillary or pectoral adenopathy.   Skin:     General: Skin is warm and dry.      Coloration: Skin is not pale.      Findings: No erythema or rash.   Neurological:       Mental Status: She is alert and oriented to person, place, and time.      Cranial Nerves: No cranial nerve deficit.      Motor: No abnormal muscle tone.      Coordination: Coordination normal.      Deep Tendon Reflexes: Reflexes are normal and symmetric. Reflexes normal.   Psychiatric:         Behavior: Behavior normal.         Thought Content: Thought content normal.         Judgment: Judgment normal.       Lab Results   Component Value Date    TRIG 163 (H) 07/26/2022    HDL 49 07/26/2022     (H) 07/26/2022    VLDL 29 07/26/2022    HGBA1C 6.10 (H) 07/26/2022            HEALTH RISK ASSESSMENT    Smoking Status:  Social History     Tobacco Use   Smoking Status Never Smoker   Smokeless Tobacco Never Used     Alcohol Consumption:  Social History     Substance and Sexual Activity   Alcohol Use Yes    Comment: 2-3 drinks a month     Fall Risk Screen:    ROXANA Fall Risk Assessment was completed, and patient is at LOW risk for falls.Assessment completed on:8/4/2022    Depression Screening:  PHQ-2/PHQ-9 Depression Screening 8/4/2022   Retired PHQ-9 Total Score -   Retired Total Score -   Little Interest or Pleasure in Doing Things 0-->not at all   Feeling Down, Depressed or Hopeless 0-->not at all   PHQ-9: Brief Depression Severity Measure Score 0       Health Habits and Functional and Cognitive Screening:  Functional & Cognitive Status 8/4/2022   Do you have difficulty preparing food and eating? No   Do you have difficulty bathing yourself, getting dressed or grooming yourself? No   Do you have difficulty using the toilet? No   Do you have difficulty moving around from place to place? No   Do you have trouble with steps or getting out of a bed or a chair? No   Current Diet Well Balanced Diet   Dental Exam Up to date   Eye Exam Up to date   Exercise (times per week) 4 times per week   Current Exercises Include Swimming;Cardiovascular Workout;Stationary Bicycling/Spin Class   Current Exercise Activities Include -    Do you need help using the phone?  No   Are you deaf or do you have serious difficulty hearing?  Yes   Do you need help with transportation? No   Do you need help shopping? No   Do you need help preparing meals?  No   Do you need help with housework?  No   Do you need help with laundry? No   Do you need help taking your medications? No   Do you need help managing money? No   Do you ever drive or ride in a car without wearing a seat belt? No   Have you felt unusual stress, anger or loneliness in the last month? No   Who do you live with? Spouse   If you need help, do you have trouble finding someone available to you? No   Have you been bothered in the last four weeks by sexual problems? No   Do you have difficulty concentrating, remembering or making decisions? Yes       Age-appropriate Screening Schedule:  Refer to the list below for future screening recommendations based on patient's age, sex and/or medical conditions. Orders for these recommended tests are listed in the plan section. The patient has been provided with a written plan.    Health Maintenance   Topic Date Due   • ZOSTER VACCINE (2 of 3) 02/26/2014   • INFLUENZA VACCINE  10/01/2022   • DXA SCAN  04/05/2023   • MAMMOGRAM  06/01/2023   • LIPID PANEL  07/26/2023   • TDAP/TD VACCINES (2 - Td or Tdap) 01/01/2024              Assessment & Plan   CMS Preventative Services Quick Reference  Risk Factors Identified During Encounter  Polypharmacy  The above risks/problems have been discussed with the patient.  Follow up actions/plans if indicated are seen below in the Assessment/Plan Section.  Pertinent information has been shared with the patient in the After Visit Summary.    Diagnoses and all orders for this visit:    1. Medicare annual wellness visit, subsequent (Primary)    2. Primary insomnia  -     zolpidem (AMBIEN) 5 MG tablet; Take 1-2 tablets by mouth At Night As Needed for Sleep.  Dispense: 50 tablet; Refill: 2    3. Anxiety  -     ALPRAZolam (XANAX)  0.25 MG tablet; Take 0.5 tablets by mouth Daily As Needed for Anxiety.  Dispense: 15 tablet; Refill: 0    4. Mixed hyperlipidemia  -     rosuvastatin (Crestor) 10 MG tablet; Take 1 tablet by mouth Every Night.  Dispense: 90 tablet; Refill: 1    5. Primary hypertension  -     losartan (Cozaar) 25 MG tablet; Take 1 tablet by mouth Daily.  Dispense: 90 tablet; Refill: 3    6. Allergic cough  -     loratadine (Claritin) 10 MG tablet; Take 1 tablet by mouth Daily.  Dispense: 90 tablet; Refill: 3    7. Therapeutic drug monitoring  -     Compliance Drug Analysis, Ur - Urine, Clean Catch; Future  -     Compliance Drug Analysis, Ur - Urine, Clean Catch    8. Prediabetes      This patient is on a controlled substance which improves symptoms/quality of life and is aware of the risks, benefits and possible side-effects current treatment. The patient denies any medication side-effects at this time. A controlled substance agreement will be obtained or is currently on file. We reviewed required monitoring for controlled substances including but not limited to quarterly follow-up visits, annual depression screening, and urine drug screens to which the patient is agreeable. A MADY report has been or shortly will be reviewed. There are no signs of deviation or misuse.   Refilled Ambien for insomnia, Xanax for anxiety.  Reviewed lab test results with patient her ASCVD had increased to 18.1%, discussed increased risk of cardiovascular event such as heart attack and stroke, patient agrees to try rosuvastatin.  Will repeat lipid and liver enzymes at follow-up appointment  Hypertension is controlled continue losartan  Continue Claritin for allergy symptoms and cough  A1c continues to be in prediabetes range, 6.1% diet information provided.  Nutrition and activity goals reviewed including: mainly water to drink, limit white flour/processed sugar, and processed foods, choose fresh fruits, vegetables, fish, lean meats,high fiber carbs,  exercise  working toward 150 mins cardio per week, weight training 2x/week.   Adequate calcium, vitamin D and weight bearing exercise are important to reduce risk of osteoporosis. Fall prevention is also important to reduce the risk of fracture. Reduce fall risk with regular vision checks, medication review, osteoporosis screening, strength and balance training. Remove tripping hazards, avoid standing too quickly, limit alcohol. Eat a healthy well-balanced diet, stay hydrated.        Follow Up:   Return in about 3 months (around 11/4/2022) for Recheck; A1c, insomnia, lipid.     An After Visit Summary and PPPS were made available to the patient.

## 2022-08-09 LAB — DRUGS UR: NORMAL

## 2022-10-06 ENCOUNTER — OFFICE VISIT (OUTPATIENT)
Dept: FAMILY MEDICINE CLINIC | Facility: CLINIC | Age: 72
End: 2022-10-06

## 2022-10-06 VITALS
BODY MASS INDEX: 24.71 KG/M2 | OXYGEN SATURATION: 96 % | HEIGHT: 68 IN | SYSTOLIC BLOOD PRESSURE: 130 MMHG | DIASTOLIC BLOOD PRESSURE: 86 MMHG | HEART RATE: 106 BPM | TEMPERATURE: 99.1 F | WEIGHT: 163 LBS

## 2022-10-06 DIAGNOSIS — J06.9 ACUTE UPPER RESPIRATORY INFECTION: Primary | ICD-10-CM

## 2022-10-06 LAB
EXPIRATION DATE: NORMAL
FLUAV AG UPPER RESP QL IA.RAPID: NOT DETECTED
FLUBV AG UPPER RESP QL IA.RAPID: NOT DETECTED
INTERNAL CONTROL: NORMAL
Lab: NORMAL
SARS-COV-2 AG UPPER RESP QL IA.RAPID: NOT DETECTED

## 2022-10-06 PROCEDURE — 99213 OFFICE O/P EST LOW 20 MIN: CPT | Performed by: NURSE PRACTITIONER

## 2022-10-06 PROCEDURE — 87428 SARSCOV & INF VIR A&B AG IA: CPT | Performed by: NURSE PRACTITIONER

## 2022-10-06 RX ORDER — HYDROCODONE BITARTRATE AND HOMATROPINE METHYLBROMIDE ORAL SOLUTION 5; 1.5 MG/5ML; MG/5ML
5 LIQUID ORAL EVERY 6 HOURS PRN
Qty: 120 ML | Refills: 0 | Status: SHIPPED | OUTPATIENT
Start: 2022-10-06 | End: 2022-10-28

## 2022-10-06 RX ORDER — PREDNISONE 20 MG/1
40 TABLET ORAL DAILY
Qty: 14 TABLET | Refills: 0 | Status: SHIPPED | OUTPATIENT
Start: 2022-10-06 | End: 2022-10-28

## 2022-10-06 NOTE — PROGRESS NOTES
Chief Complaint   Patient presents with   • Generalized Body Aches   • Fever     100.4   • Cough       Subjective   Dixie Cervantes is a 71 y.o. female    History of Present Illness  Patient today for upper respiratory symptoms.  This all started with swollen glands and a very sore throat.  She now has chest soreness tightness with a productive cough and clear nasal drainage.  She also has had a low-grade fever.  She has tried NyQuil and Chloraseptic which have not really helped much.    Allergies   Allergen Reactions   • Celebrex [Celecoxib] Angioedema   • Sulfa Antibiotics Swelling   • Ketek [Telithromycin] Other (See Comments)     Migraine   • Bupropion Irritability     Past Medical History:   Diagnosis Date   • Chronic cough     thought to be cough variant asthma   • Chronic low back pain    • Closed right ankle fracture 01/2020   • Cornea abrasion 2019   • Diverticulosis    • Esophageal spasm    • External otitis     Recurring   • OCTAVIO (generalized anxiety disorder)    • GERD (gastroesophageal reflux disease)    • History of bladder infections    • Insomnia    • Inverted nipple     Left   • Irritable bowel syndrome (IBS)    • Osteoporosis 2020   • Parasitic infection     History of multiple infections   • Urinary, incontinence, stress female    • Vestibular vertigo       Past Surgical History:   Procedure Laterality Date   • ANKLE SURGERY Right 01/23/2020    [facility]   • APPENDECTOMY     • BREAST CYST ASPIRATION Right    • CHOLECYSTECTOMY     • CYST REMOVAL      Multiple Scalp cysts   • CYST REMOVAL  2019    Located on neck   • CYSTOSCOPY     • LAPAROTOMY OOPHERECTOMY Right    • MOLE REMOVAL      Multiple   • ORIF FIBULA FRACTURE Right 01/23/2020    R lateral mallolus fibula ankle fracture   • THYROID BIOPSY      Needle biopsy     Social History     Socioeconomic History   • Marital status:    Tobacco Use   • Smoking status: Never Smoker   • Smokeless tobacco: Never Used   Substance and Sexual  Activity   • Alcohol use: Yes     Comment: 2-3 drinks a month   • Drug use: No        Current Outpatient Medications:   •  ALPRAZolam (XANAX) 0.25 MG tablet, Take 0.5 tablets by mouth Daily As Needed for Anxiety., Disp: 15 tablet, Rfl: 0  •  denosumab (Prolia) 60 MG/ML solution prefilled syringe syringe, Prolia 60 mg/mL subcutaneous syringe  60 mg subcutaneous every 6 months, Disp: , Rfl:   •  diclofenac (VOLTAREN) 1 % gel gel, As Needed., Disp: , Rfl:   •  glucosamine-chondroitin 500-400 MG capsule capsule, Take 1 capsule by mouth 3 (Three) Times a Day With Meals., Disp: , Rfl:   •  Lactobacillus (PROBIOTIC ACIDOPHILUS PO), Take 1 tablet by mouth Daily., Disp: , Rfl:   •  loratadine (Claritin) 10 MG tablet, Take 1 tablet by mouth Daily., Disp: 90 tablet, Rfl: 3  •  losartan (Cozaar) 25 MG tablet, Take 1 tablet by mouth Daily., Disp: 90 tablet, Rfl: 3  •  Multiple Vitamins-Minerals (MULTI VITAMIN/MINERALS) tablet, Take 1 tablet by mouth., Disp: , Rfl:   •  nystatin (MYCOSTATIN) 654661 UNIT/GM cream, Apply  topically to the appropriate area as directed As Needed (yeast rash)., Disp: 30 g, Rfl: 2  •  rosuvastatin (Crestor) 10 MG tablet, Take 1 tablet by mouth Every Night., Disp: 90 tablet, Rfl: 1  •  zolpidem (AMBIEN) 5 MG tablet, Take 1-2 tablets by mouth At Night As Needed for Sleep., Disp: 50 tablet, Rfl: 2  •  HYDROcodone Bit-Homatrop MBr (HYCODAN) 5-1.5 MG/5ML solution, Take 5 mL by mouth Every 6 (Six) Hours As Needed for Cough., Disp: 120 mL, Rfl: 0  •  predniSONE (DELTASONE) 20 MG tablet, Take 2 tablets by mouth Daily., Disp: 14 tablet, Rfl: 0     Review of Systems   Constitutional: Positive for fever. Negative for chills, fatigue and unexpected weight change.   HENT: Positive for congestion, postnasal drip, rhinorrhea and sore throat.    Respiratory: Positive for cough and chest tightness. Negative for shortness of breath and wheezing.    Cardiovascular: Negative for chest pain, palpitations and leg swelling.    Gastrointestinal: Negative for constipation, diarrhea, nausea and vomiting.   Genitourinary: Negative for difficulty urinating and dysuria.   Skin: Negative for color change and rash.   Neurological: Positive for headaches. Negative for dizziness, syncope and weakness.   Psychiatric/Behavioral: Positive for sleep disturbance.       Objective     Vitals:    10/06/22 1331   BP: 130/86   Pulse: 106   Temp: 99.1 °F (37.3 °C)   SpO2: 96%         10/06/22  1331   Weight: 73.9 kg (163 lb)     Body mass index is 24.79 kg/m².  Results for orders placed or performed in visit on 10/06/22   POCT SARS-CoV-2 Antigen AMBER + Flu    Specimen: Swab   Result Value Ref Range    SARS Antigen Not Detected Not Detected, Presumptive Negative    Influenza A Antigen AMBER Not Detected Not Detected    Influenza B Antigen AMBER Not Detected Not Detected    Internal Control Passed Passed    Lot Number [id number]     Expiration Date 11-6-22        Physical Exam  Vitals reviewed.   Constitutional:       Appearance: She is well-developed.   HENT:      Head: Normocephalic and atraumatic.      Right Ear: Hearing, tympanic membrane and external ear normal.      Left Ear: Hearing, tympanic membrane and external ear normal.      Nose: Rhinorrhea present.      Mouth/Throat:      Pharynx: Posterior oropharyngeal erythema present.   Cardiovascular:      Rate and Rhythm: Normal rate and regular rhythm.      Heart sounds: Normal heart sounds.   Pulmonary:      Effort: Pulmonary effort is normal.      Breath sounds: Normal breath sounds.   Skin:     General: Skin is warm and dry.   Neurological:      Mental Status: She is alert and oriented to person, place, and time.   Psychiatric:         Behavior: Behavior normal.         Assessment & Plan   Problems Addressed this Visit    None     Visit Diagnoses     Acute upper respiratory infection    -  Primary    Relevant Medications    HYDROcodone Bit-Homatrop MBr (HYCODAN) 5-1.5 MG/5ML solution    predniSONE  (DELTASONE) 20 MG tablet    Other Relevant Orders    POCT SARS-CoV-2 Antigen AMBER + Flu (Completed)      Diagnoses       Codes Comments    Acute upper respiratory infection    -  Primary ICD-10-CM: J06.9  ICD-9-CM: 465.9       Patient to take medications as directed. Make sure to take all of them. Return if no improvement or worsens in 5-7 days. If concerned after hours, may go to [facility] or ER for evaluation/Treatment.As part of this patient's treatment plan I am prescribing controlled substances. The patient has been made aware of appropriate use of such medications, including potential risk of somnolence, limited ability to drive and /or work safely, and potential for dependence or overdose. It has also been made clear that these medications are for use by this patient only, without concomitant use of alcohol or other substances unless prescribed.  Patient has completed prescribing agreement detailing terms of continued prescribing of controlled substances, including monitoring MADY reports, urine drug screening, and pill counts if necessary. The patient is aware that inappropriate use will result in cessation of prescribing such medications.  MADY report has been reviewed and scanned into the patient's chart.  History and physical exam exhibit continued safe and appropriate use of controlled substances at this time. This patient appears to have a low risk of dependence at this time.     Time spent on visit, including counseling, education, reviewing the chart, and any recent test results, was  10      Minutes    Return visit in PRN    Counseling provided on Acute upper resp.    Nitin Lan, APRN   10/6/2022   14:03 EDT     Please note that portions of this document were completed with a voice recognition program.

## 2022-10-08 ENCOUNTER — TELEPHONE (OUTPATIENT)
Dept: FAMILY MEDICINE CLINIC | Facility: CLINIC | Age: 72
End: 2022-10-08

## 2022-10-08 NOTE — TELEPHONE ENCOUNTER
Patient calling stating her blood pressure gets high when she takes prednisone that she was prescribed. She has stopped taking it and it has gone back to normal. She is wanting someone to call her to let her know what to do next. Please advise.

## 2022-10-10 NOTE — TELEPHONE ENCOUNTER
Patient saw brigette Villalobos to stop prednisone, continue claritin, lots of fluids and rest. Follow up if symptoms do not improve

## 2022-10-10 NOTE — TELEPHONE ENCOUNTER
Contacted patient to inquire further. She states that she had stopped using steroids over the weekend because she was concerned about HTN. Her BP was 161/86. She has held prednisone for the past 2 days and BP has returned to normal.    She is asking if she needs to continue?

## 2022-10-27 ENCOUNTER — TELEPHONE (OUTPATIENT)
Dept: FAMILY MEDICINE CLINIC | Facility: CLINIC | Age: 72
End: 2022-10-27

## 2022-10-27 DIAGNOSIS — R73.03 PREDIABETES: ICD-10-CM

## 2022-10-27 DIAGNOSIS — E78.5 DYSLIPIDEMIA: Primary | ICD-10-CM

## 2022-10-27 DIAGNOSIS — I10 PRIMARY HYPERTENSION: ICD-10-CM

## 2022-10-27 NOTE — TELEPHONE ENCOUNTER
Caller: Dixie Cervantes    Relationship: Self    Best call back number: [phone]    What orders are you requesting (i.e. lab or imaging): FASTING LAB ORDERS    In what timeframe would the patient need to come in: ASAP    Where will you receive your lab/imaging services: IN OFFICE    Additional notes: PATIENT WOULD LIKE TO GET HER LABS DRAWN BEFORE APPOINTMENT ON 11/04/2022 SO RESULTS WILL BE IN ON HER APPOINTMENT DATE. PLEASE CALL PATIENT WHEN ORDERS HAVE BEEN PLACED. CAN LEAVE VOICEMAIL

## 2022-10-27 NOTE — TELEPHONE ENCOUNTER
Pt notified that labs have ordered. Pt verbalized understanding and did not have any additional questions at this time.

## 2022-10-28 ENCOUNTER — HOSPITAL ENCOUNTER (OUTPATIENT)
Dept: GENERAL RADIOLOGY | Facility: HOSPITAL | Age: 72
Discharge: HOME | End: 2022-10-28

## 2022-10-28 ENCOUNTER — OFFICE VISIT (OUTPATIENT)
Dept: FAMILY MEDICINE CLINIC | Facility: CLINIC | Age: 72
End: 2022-10-28

## 2022-10-28 ENCOUNTER — LAB (OUTPATIENT)
Dept: LAB | Facility: HOSPITAL | Age: 72
End: 2022-10-28

## 2022-10-28 VITALS
TEMPERATURE: 97.1 F | DIASTOLIC BLOOD PRESSURE: 80 MMHG | BODY MASS INDEX: 24.55 KG/M2 | HEIGHT: 68 IN | WEIGHT: 162 LBS | OXYGEN SATURATION: 97 % | SYSTOLIC BLOOD PRESSURE: 134 MMHG | RESPIRATION RATE: 18 BRPM | HEART RATE: 90 BPM
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
## 2022-10-28 DIAGNOSIS — M54.6 PAIN IN THORACIC SPINE: ICD-10-CM

## 2022-10-28 DIAGNOSIS — I10 PRIMARY HYPERTENSION: ICD-10-CM

## 2022-10-28 DIAGNOSIS — E78.5 DYSLIPIDEMIA: ICD-10-CM

## 2022-10-28 DIAGNOSIS — R73.03 PREDIABETES: ICD-10-CM

## 2022-10-28 LAB
ALBUMIN SERPL-MCNC: 4.5 G/DL (ref 3.5–5.2)
ALBUMIN/GLOB SERPL: 1.7 G/DL
ALP SERPL-CCNC: 54 U/L (ref 39–117)
ALT SERPL W P-5'-P-CCNC: 14 U/L (ref 1–33)
ANION GAP SERPL CALCULATED.3IONS-SCNC: 11.6 MMOL/L (ref 5–15)
AST SERPL-CCNC: 23 U/L (ref 1–32)
BILIRUB SERPL-MCNC: 0.7 MG/DL (ref 0–1.2)
BUN SERPL-MCNC: 12 MG/DL (ref 8–23)
BUN/CREAT SERPL: 12.5 (ref 7–25)
CALCIUM SPEC-SCNC: 10 MG/DL (ref 8.6–10.5)
CHLORIDE SERPL-SCNC: 105 MMOL/L (ref 98–107)
CHOLEST SERPL-MCNC: 122 MG/DL (ref 0–200)
CO2 SERPL-SCNC: 26.4 MMOL/L (ref 22–29)
CREAT SERPL-MCNC: 0.96 MG/DL (ref 0.57–1)
EGFRCR SERPLBLD CKD-EPI 2021: 63 ML/MIN/1.73
GLOBULIN UR ELPH-MCNC: 2.6 GM/DL
GLUCOSE SERPL-MCNC: 118 MG/DL (ref 65–99)
HBA1C MFR BLD: 6.9 % (ref 4.8–5.6)
HDLC SERPL-MCNC: 55 MG/DL (ref 40–60)
LDLC SERPL CALC-MCNC: 45 MG/DL (ref 0–100)
LDLC/HDLC SERPL: 0.77 {RATIO}
POTASSIUM SERPL-SCNC: 4.9 MMOL/L (ref 3.5–5.2)
PROT SERPL-MCNC: 7.1 G/DL (ref 6–8.5)
SODIUM SERPL-SCNC: 143 MMOL/L (ref 136–145)
TRIGL SERPL-MCNC: 124 MG/DL (ref 0–150)
VLDLC SERPL-MCNC: 22 MG/DL (ref 5–40)

## 2022-10-28 PROCEDURE — 72070 X-RAY EXAM THORAC SPINE 2VWS: CPT

## 2022-10-28 PROCEDURE — 80061 LIPID PANEL: CPT

## 2022-10-28 PROCEDURE — 83036 HEMOGLOBIN GLYCOSYLATED A1C: CPT

## 2022-10-28 PROCEDURE — 99213 OFFICE O/P EST LOW 20 MIN: CPT | Performed by: NURSE PRACTITIONER

## 2022-10-28 PROCEDURE — 80053 COMPREHEN METABOLIC PANEL: CPT

## 2022-11-01 ENCOUNTER — TELEPHONE (OUTPATIENT)
Dept: FAMILY MEDICINE CLINIC | Facility: CLINIC | Age: 72
End: 2022-11-01

## 2022-11-01 NOTE — TELEPHONE ENCOUNTER
Contacted pt & relayed PCP's message & referral coordinator's message. She had no further questions at this time.     Linda Adhikari APRN You 2 minutes ago (12:58 PM)     She can try a soft brace that secures with velcro, used for posture correction with lifting. Ice pack to back or alternate ice and heat. Ludy is checking on the referral       Kannan, Ludy, RegSched Rep  You 7 minutes ago (12:54 PM)     JS  Since the referral is not urgent it could take 2-4 weeks to get patient seen

## 2022-11-01 NOTE — TELEPHONE ENCOUNTER
PATIENT HAS CALLED REQUESTING A CALL BACK ASAP WITH STATUS OF HER MRI REFERRAL. PATIENT STATES HER BACK PAIN HAS GOTTEN WORSE AND IS WANTING TO KNOW HOW LONG IT WILL BE BEFORE BEING SCHEDULED AND ALSO IF SOMEONE CAN GIVE HER A CALL TO ADVISE ON WHAT SHE CAN DO FOR THE PAIN AND IF THERE MAY BE SOME OVER THE COUNTER BRACE OR ANYTHING THAT CAN BE OBTAINED TO ASSIST WITH HER PAIN.    CALL BACK NUMBER [phone]

## 2022-11-04 ENCOUNTER — PRIOR AUTHORIZATION (OUTPATIENT)
Dept: FAMILY MEDICINE CLINIC | Facility: CLINIC | Age: 72
End: 2022-11-04

## 2022-11-04 ENCOUNTER — OFFICE VISIT (OUTPATIENT)
Dept: FAMILY MEDICINE CLINIC | Facility: CLINIC | Age: 72
End: 2022-11-04

## 2022-11-04 VITALS
HEIGHT: 68 IN | TEMPERATURE: 97.5 F | WEIGHT: 163 LBS | RESPIRATION RATE: 16 BRPM | SYSTOLIC BLOOD PRESSURE: 152 MMHG | DIASTOLIC BLOOD PRESSURE: 86 MMHG | HEART RATE: 94 BPM | OXYGEN SATURATION: 98 % | BODY MASS INDEX: 24.71 KG/M2

## 2022-11-04 DIAGNOSIS — E78.2 MIXED HYPERLIPIDEMIA: ICD-10-CM

## 2022-11-04 DIAGNOSIS — I10 PRIMARY HYPERTENSION: ICD-10-CM

## 2022-11-04 DIAGNOSIS — M54.6 THORACIC SPINE PAIN: Primary | ICD-10-CM

## 2022-11-04 DIAGNOSIS — R73.03 PREDIABETES: ICD-10-CM

## 2022-11-04 DIAGNOSIS — F51.01 PRIMARY INSOMNIA: ICD-10-CM

## 2022-11-04 PROCEDURE — 99214 OFFICE O/P EST MOD 30 MIN: CPT | Performed by: NURSE PRACTITIONER

## 2022-11-04 RX ORDER — LOSARTAN POTASSIUM 50 MG/1
50 TABLET ORAL DAILY
Qty: 90 TABLET | Refills: 1 | Status: SHIPPED | OUTPATIENT
Start: 2022-11-04

## 2022-11-04 RX ORDER — LIDOCAINE 50 MG/G
1 PATCH TOPICAL EVERY 24 HOURS
Qty: 30 EACH | Refills: 1 | Status: SHIPPED | OUTPATIENT
Start: 2022-11-04 | End: 2022-11-07

## 2022-11-04 RX ORDER — ZOLPIDEM TARTRATE 5 MG/1
5-10 TABLET ORAL NIGHTLY PRN
Qty: 50 TABLET | Refills: 2 | Status: SHIPPED | OUTPATIENT
Start: 2022-11-04 | End: 2023-01-28

## 2022-11-04 RX ORDER — ROSUVASTATIN CALCIUM 10 MG/1
10 TABLET, COATED ORAL NIGHTLY
Qty: 90 TABLET | Refills: 1 | Status: SHIPPED | OUTPATIENT
Start: 2022-11-04

## 2022-11-04 NOTE — PROGRESS NOTES
Subjective   Dixie Cervantes is a 72 y.o. female.   Chief Complaint   Patient presents with   • Follow-up     Insomnia    • Med Refill     Pt needing to ask questions about lidocaine patches for back       History of Present Illness   Patient is here for follow up for insomnia, on Ambien for several years. Has tried to wean off, still has trouble sleeping at time, able to take just 5 mg some nights. She has xanax that she takes only prn for anxiety.  Had labs drawn prior to appt. A1c is now in diabetes range, she does not want to start a med at this time; wants to work on diet changes first.Knows she has been eating a poor diet, high carbs   BP at home 130's, rarely less than 130/80  Continues to have pain in thoracic spine, xray showed age indeterminate anterior wedging at T7. Since pain has worsened, she will be scheduled for an MRI. Has some flexeril and lidocaine patches at home that have helped with the pain. rates pain as a 4 in thoracic spine; . Patient does not want to try physical therapy until after MRI is done. Pain has interrupted her sleep a few times.       The following portions of the patient's history were reviewed and updated as appropriate: allergies, current medications, past family history, past medical history, past social history, past surgical history and problem list.    Review of Systems   Eyes: Negative for visual disturbance.   Respiratory: Negative for shortness of breath.    Cardiovascular: Negative for chest pain.   Endocrine: Negative for polydipsia and polyuria.   Genitourinary: Negative for difficulty urinating and dysuria.   Musculoskeletal: Positive for arthralgias and back pain.   Neurological: Negative for dizziness, light-headedness and headaches.       Objective   Physical Exam  Vitals reviewed.   Constitutional:       Appearance: Normal appearance.   Cardiovascular:      Rate and Rhythm: Regular rhythm.   Pulmonary:      Effort: Pulmonary effort is normal. No respiratory  "distress.      Breath sounds: Normal breath sounds.   Musculoskeletal:         General: Tenderness (thoracic spine and just right of the spine) present.   Neurological:      Mental Status: She is alert.   Psychiatric:         Attention and Perception: Attention normal.         Mood and Affect: Mood is anxious.         Speech: Speech normal.         Behavior: Behavior normal.         Thought Content: Thought content normal.       /86 (BP Location: Right arm, Patient Position: Sitting, Cuff Size: Adult)   Pulse 94   Temp 97.5 °F (36.4 °C) (Temporal)   Resp 16   Ht 172.7 cm (67.99\")   Wt 73.9 kg (163 lb)   SpO2 98%   BMI 24.79 kg/m²     Assessment & Plan   Diagnoses and all orders for this visit:    1. Thoracic spine pain (Primary)  -     lidocaine (LIDODERM) 5 %; Place 1 patch on the skin as directed by provider Daily. Remove & Discard patch within 12 hours or as directed by MD  Dispense: 30 each; Refill: 1    2. Primary hypertension  -     losartan (Cozaar) 50 MG tablet; Take 1 tablet by mouth Daily.  Dispense: 90 tablet; Refill: 1    3. Primary insomnia  -     zolpidem (AMBIEN) 5 MG tablet; Take 1-2 tablets by mouth At Night As Needed for Sleep.  Dispense: 50 tablet; Refill: 2    4. Mixed hyperlipidemia  -     rosuvastatin (Crestor) 10 MG tablet; Take 1 tablet by mouth Every Night.  Dispense: 90 tablet; Refill: 1    5. Prediabetes        New prescription sent in for lidocaine patches, advised to avoid heavy lifting or extreme position changes until MRI is done.  HTN is not controlled. Increased losartan to 50 mg, monitor BP at home, goal 130/80 or less  This patient is on a controlled substance which improves symptoms/quality of life and is aware of the risks, benefits and possible side-effects current treatment. The patient denies any medication side-effects at this time. A controlled substance agreement will be obtained or is currently on file. We reviewed required monitoring for controlled substances " including but not limited to quarterly follow-up visits, annual depression screening, and urine drug screens to which the patient is agreeable. A MADY report has been or shortly will be reviewed. There are no signs of deviation or misuse.   Refilled ambien, ok to take xanax prn  Continue statin for HLD  A1c is in diabetes range now, patient want to work on diet for 3 months, if A1c is still in diabetes range will prescribe med at that time  Provided information on diabetes and dyslipidemia  Patient was encouraged to keep me informed of any acute changes, lack of improvement, or any new concerning symptoms.

## 2022-11-05 DIAGNOSIS — M54.6 THORACIC SPINE PAIN: ICD-10-CM

## 2022-11-07 RX ORDER — LIDOCAINE 50 MG/G
PATCH TOPICAL
Qty: 90 PATCH | Refills: 0 | Status: SHIPPED | OUTPATIENT
Start: 2022-11-07 | End: 2023-03-07

## 2022-11-07 NOTE — TELEPHONE ENCOUNTER
Rx Refill Note  Requested Prescriptions     Pending Prescriptions Disp Refills   • lidocaine (LIDODERM) 5 % [Pharmacy Med Name: LIDOCAINE 5% PATCH] 90 patch      Sig: PLACE 1 PATCH ON SKIN ONCE DAILY REMOVE AND DISCARD PATCH WITHIN 12 HOURS OR AS DIRECTED      Last office visit with prescribing clinician: 11/4/2022      Next office visit with prescribing clinician: 2/6/2023            Lillie Tran MA  11/07/22, 14:03 EST

## 2022-11-09 ENCOUNTER — TELEPHONE (OUTPATIENT)
Dept: FAMILY MEDICINE CLINIC | Facility: CLINIC | Age: 72
End: 2022-11-09

## 2022-11-09 NOTE — TELEPHONE ENCOUNTER
PATIENT STATES MRI HAS BEEN SCHEDULED FOR 12/14/22.  IS THERE THINGS SHE SHOULD AND SHOULDN'T DO PRIOR TO THIS SUCH AS ICE THERAPY.      PLEASE CALL [phone]  OKAY TO LEAVE MESSAGE

## 2022-11-10 NOTE — TELEPHONE ENCOUNTER
Patient should stay active, avoid heavy lifting or movements that worsen pain. She may alternate ice and heat if that helps. Take tylenol 1000 mg three times a day. If pain is not controlled, let me know.

## 2022-12-14 ENCOUNTER — HOSPITAL ENCOUNTER (OUTPATIENT)
Dept: MRI IMAGING | Facility: HOSPITAL | Age: 72
Discharge: HOME | End: 2022-12-14
Admitting: NURSE PRACTITIONER
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
## 2022-12-14 DIAGNOSIS — M54.6 PAIN IN THORACIC SPINE: ICD-10-CM

## 2022-12-14 LAB — CREAT BLDA-MCNC: 0.8 MG/DL (ref 0.6–1.3)

## 2022-12-14 PROCEDURE — 82565 ASSAY OF CREATININE: CPT

## 2022-12-14 PROCEDURE — 72157 MRI CHEST SPINE W/O & W/DYE: CPT

## 2022-12-14 PROCEDURE — 0 GADOBENATE DIMEGLUMINE 529 MG/ML SOLUTION: Performed by: NURSE PRACTITIONER

## 2022-12-14 PROCEDURE — A9577 INJ MULTIHANCE: HCPCS | Performed by: NURSE PRACTITIONER

## 2022-12-14 RX ADMIN — GADOBENATE DIMEGLUMINE 14 ML: 529 INJECTION, SOLUTION INTRAVENOUS at 15:37

## 2022-12-15 DIAGNOSIS — G89.29 CHRONIC MIDLINE THORACIC BACK PAIN: Primary | ICD-10-CM

## 2022-12-15 DIAGNOSIS — M54.6 CHRONIC MIDLINE THORACIC BACK PAIN: Primary | ICD-10-CM

## 2023-01-26 ENCOUNTER — TELEPHONE (OUTPATIENT)
Dept: FAMILY MEDICINE CLINIC | Facility: CLINIC | Age: 73
End: 2023-01-26
Payer: MEDICARE

## 2023-01-26 NOTE — TELEPHONE ENCOUNTER
PATIENT HAS CALLED REQUESTING A CALL BACK ASAP TO ADVISE ON WHETHER OR NOT SHE WILL BE NEEDING LAB WORK DONE IN PREPARATION FOR HER UPCOMING APPOINTMENT 02/06/2023. PATIENT IS REQUESTING ORDERS TO BE PUT IN ASAP IF SHE IS NEEDING LABS AND A CALL BACK TO LET HER KNOW ORDERS HAVE BEEN PLACED.    CALL BACK NUMBER [phone]

## 2023-01-28 DIAGNOSIS — F51.01 PRIMARY INSOMNIA: ICD-10-CM

## 2023-01-28 RX ORDER — ZOLPIDEM TARTRATE 5 MG/1
TABLET ORAL
Qty: 50 TABLET | Refills: 0 | Status: SHIPPED | OUTPATIENT
Start: 2023-01-28 | End: 2023-02-06 | Stop reason: SDUPTHER

## 2023-02-06 ENCOUNTER — OFFICE VISIT (OUTPATIENT)
Dept: FAMILY MEDICINE CLINIC | Facility: CLINIC | Age: 73
End: 2023-02-06
Payer: MEDICARE

## 2023-02-06 VITALS
BODY MASS INDEX: 23.95 KG/M2 | OXYGEN SATURATION: 96 % | WEIGHT: 158 LBS | HEART RATE: 75 BPM | HEIGHT: 68 IN | DIASTOLIC BLOOD PRESSURE: 84 MMHG | SYSTOLIC BLOOD PRESSURE: 136 MMHG

## 2023-02-06 DIAGNOSIS — R73.03 PREDIABETES: Primary | ICD-10-CM

## 2023-02-06 DIAGNOSIS — R05.3 CHRONIC COUGH: ICD-10-CM

## 2023-02-06 DIAGNOSIS — K21.9 GASTROESOPHAGEAL REFLUX DISEASE WITHOUT ESOPHAGITIS: ICD-10-CM

## 2023-02-06 DIAGNOSIS — F51.01 PRIMARY INSOMNIA: ICD-10-CM

## 2023-02-06 DIAGNOSIS — F41.9 ANXIETY: ICD-10-CM

## 2023-02-06 LAB
EXPIRATION DATE: NORMAL
HBA1C MFR BLD: 5.8 %
Lab: NORMAL

## 2023-02-06 PROCEDURE — 99214 OFFICE O/P EST MOD 30 MIN: CPT | Performed by: NURSE PRACTITIONER

## 2023-02-06 PROCEDURE — 83036 HEMOGLOBIN GLYCOSYLATED A1C: CPT | Performed by: NURSE PRACTITIONER

## 2023-02-06 PROCEDURE — 3044F HG A1C LEVEL LT 7.0%: CPT | Performed by: NURSE PRACTITIONER

## 2023-02-06 RX ORDER — ZOLPIDEM TARTRATE 5 MG/1
5-10 TABLET ORAL NIGHTLY PRN
Qty: 50 TABLET | Refills: 2 | Status: SHIPPED | OUTPATIENT
Start: 2023-02-06

## 2023-02-06 RX ORDER — ALPRAZOLAM 0.25 MG/1
0.12 TABLET ORAL DAILY PRN
Qty: 15 TABLET | Refills: 0 | Status: SHIPPED | OUTPATIENT
Start: 2023-02-06

## 2023-02-06 RX ORDER — FAMOTIDINE 20 MG/1
20 TABLET, FILM COATED ORAL 2 TIMES DAILY
Qty: 180 TABLET | Refills: 1 | Status: SHIPPED | OUTPATIENT
Start: 2023-02-06

## 2023-02-06 NOTE — PROGRESS NOTES
Subjective   Dixie Cervantes is a 72 y.o. female.   Chief Complaint   Patient presents with   • Prediabetes   • Insomnia     Medication refill   • Anxiety       History of Present Illness   Patient is here for follow up for prediabetes, last nA1c was in diabetes range, but patient admitted to eating more sweets than usual. insomnia on Ambien for years and anxiety,with prn xanax.  Has been working on diet changes since last visit, did not want to start a medications for diabetes.   The following portions of the patient's history were reviewed and updated as appropriate: allergies, current medications, past family history, past medical history, past social history, past surgical history and problem list.    Review of Systems   Constitutional: Negative for chills, fatigue and fever.   Eyes: Negative for visual disturbance.   Respiratory: Positive for cough (chronic) and chest tightness (occ with coughing). Negative for shortness of breath.    Cardiovascular: Negative for chest pain, palpitations and leg swelling.   Gastrointestinal: Negative for constipation and diarrhea.   Genitourinary: Negative for difficulty urinating and dysuria.   Neurological: Negative for dizziness, light-headedness and headaches.   Psychiatric/Behavioral: Positive for sleep disturbance (controlled). The patient is nervous/anxious (controlled).        Objective   Physical Exam  Vitals reviewed.   Constitutional:       Appearance: Normal appearance. She is not ill-appearing or diaphoretic.   HENT:      Head: Normocephalic and atraumatic.   Cardiovascular:      Rate and Rhythm: Normal rate and regular rhythm.      Heart sounds: Normal heart sounds.   Pulmonary:      Effort: Pulmonary effort is normal. No respiratory distress.      Breath sounds: Normal breath sounds.   Neurological:      Mental Status: She is alert and oriented to person, place, and time.   Psychiatric:         Behavior: Behavior normal.         Thought Content: Thought content  "normal.         Judgment: Judgment normal.       /84   Pulse 75   Ht 172.7 cm (67.99\")   Wt 71.7 kg (158 lb)   SpO2 96%   BMI 24.03 kg/m²     Assessment & Plan   Diagnoses and all orders for this visit:    1. Prediabetes (Primary)  -     POC Glycosylated Hemoglobin (Hb A1C)    2. Primary insomnia  -     zolpidem (AMBIEN) 5 MG tablet; Take 1-2 tablets by mouth At Night As Needed for Sleep.  Dispense: 50 tablet; Refill: 2    3. Anxiety  -     ALPRAZolam (XANAX) 0.25 MG tablet; Take 0.5 tablets by mouth Daily As Needed for Anxiety.  Dispense: 15 tablet; Refill: 0    4. Gastroesophageal reflux disease without esophagitis  -     famotidine (Pepcid) 20 MG tablet; Take 1 tablet by mouth 2 (Two) Times a Day.  Dispense: 180 tablet; Refill: 1    5. Chronic cough  -     famotidine (Pepcid) 20 MG tablet; Take 1 tablet by mouth 2 (Two) Times a Day.  Dispense: 180 tablet; Refill: 1        Results for orders placed or performed in visit on 02/06/23   POC Glycosylated Hemoglobin (Hb A1C)    Specimen: Blood   Result Value Ref Range    Hemoglobin A1C 5.8 %    Lot Number [id number]     Expiration Date 11/07/2024      A1c is much improved, back to prediabetes range. Continue lifestyle changes with diet and exercise  Insomnia is controlled with ambien, unable to wean off, most nights takes 10 mg. Uses xanax sparingly  This patient is on a controlled substance which improves symptoms/quality of life and is aware of the risks, benefits and possible side-effects current treatment. The patient denies any medication side-effects at this time. A controlled substance agreement will be obtained or is currently on file. We reviewed required monitoring for controlled substances including but not limited to quarterly follow-up visits, annual depression screening, and urine drug screens to which the patient is agreeable. A [facility] report has been or shortly will be reviewed. There are no signs of deviation or misuse.   Continue famotidine " for GERD and chronic cough  Patient was encouraged to keep me informed of any acute changes, lack of improvement, or any new concerning symptoms.

## 2023-03-06 ENCOUNTER — TELEPHONE (OUTPATIENT)
Dept: FAMILY MEDICINE CLINIC | Facility: CLINIC | Age: 73
End: 2023-03-06
Payer: MEDICARE

## 2023-03-07 DIAGNOSIS — M54.6 THORACIC SPINE PAIN: ICD-10-CM

## 2023-03-07 RX ORDER — LIDOCAINE 50 MG/G
PATCH TOPICAL
Qty: 90 PATCH | Refills: 0 | Status: SHIPPED | OUTPATIENT
Start: 2023-03-07

## 2023-03-07 NOTE — TELEPHONE ENCOUNTER
Rx Refill Note  Requested Prescriptions     Pending Prescriptions Disp Refills   • lidocaine (LIDODERM) 5 % [Pharmacy Med Name: LIDOCAINE 5% PATCH] 90 patch 0     Sig: PLACE 1 PATCH ON SKIN ONCE DAILY REMOVE AND DISCARD PATCH WITHIN 12 HOURS OR AS DIRECTED      Last office visit with prescribing clinician: 2/6/2023   Last telemedicine visit with prescribing clinician: 5/8/2023   Next office visit with prescribing clinician: 5/8/2023                         Would you like a call back once the refill request has been completed: [] Yes [] No    If the office needs to give you a call back, can they leave a voicemail: [] Yes [] No    Maged Maxwell MA  03/07/23, 16:19 EST

## 2023-05-01 DIAGNOSIS — I10 PRIMARY HYPERTENSION: ICD-10-CM

## 2023-05-01 RX ORDER — LOSARTAN POTASSIUM 50 MG/1
50 TABLET ORAL DAILY
Qty: 90 TABLET | Refills: 1 | Status: SHIPPED | OUTPATIENT
Start: 2023-05-01

## 2023-05-01 NOTE — TELEPHONE ENCOUNTER
Rx Refill Note  Requested Prescriptions     Pending Prescriptions Disp Refills   • losartan (COZAAR) 50 MG tablet [Pharmacy Med Name: LOSARTAN 50MG TABLETS] 90 tablet 1     Sig: TAKE 1 TABLET BY MOUTH DAILY      Last office visit with prescribing clinician: 2/6/2023   Last telemedicine visit with prescribing clinician: 5/22/2023   Next office visit with prescribing clinician: 5/22/2023                         Would you like a call back once the refill request has been completed: [] Yes [] No    If the office needs to give you a call back, can they leave a voicemail: [] Yes [] No    Bonita Enriquez MA  05/01/23, 13:07 EDT

## 2023-05-22 ENCOUNTER — OFFICE VISIT (OUTPATIENT)
Dept: FAMILY MEDICINE CLINIC | Facility: CLINIC | Age: 73
End: 2023-05-22
Payer: MEDICARE

## 2023-05-22 VITALS
HEART RATE: 90 BPM | WEIGHT: 155.4 LBS | DIASTOLIC BLOOD PRESSURE: 80 MMHG | HEIGHT: 68 IN | TEMPERATURE: 98.1 F | OXYGEN SATURATION: 97 % | BODY MASS INDEX: 23.55 KG/M2 | SYSTOLIC BLOOD PRESSURE: 130 MMHG

## 2023-05-22 DIAGNOSIS — M54.6 THORACIC SPINE PAIN: ICD-10-CM

## 2023-05-22 DIAGNOSIS — I10 PRIMARY HYPERTENSION: ICD-10-CM

## 2023-05-22 DIAGNOSIS — F51.01 PRIMARY INSOMNIA: ICD-10-CM

## 2023-05-22 DIAGNOSIS — E78.5 DYSLIPIDEMIA: ICD-10-CM

## 2023-05-22 DIAGNOSIS — F41.9 ANXIETY: ICD-10-CM

## 2023-05-22 DIAGNOSIS — R73.03 PREDIABETES: Primary | ICD-10-CM

## 2023-05-22 DIAGNOSIS — E78.2 MIXED HYPERLIPIDEMIA: ICD-10-CM

## 2023-05-22 DIAGNOSIS — R11.0 NAUSEA: ICD-10-CM

## 2023-05-22 LAB
EXPIRATION DATE: NORMAL
HBA1C MFR BLD: 5.9 %
Lab: NORMAL

## 2023-05-22 RX ORDER — ALPRAZOLAM 0.25 MG/1
0.12 TABLET ORAL DAILY PRN
Qty: 15 TABLET | Refills: 0 | Status: SHIPPED | OUTPATIENT
Start: 2023-05-22

## 2023-05-22 RX ORDER — ROSUVASTATIN CALCIUM 10 MG/1
10 TABLET, COATED ORAL NIGHTLY
Qty: 90 TABLET | Refills: 1 | Status: SHIPPED | OUTPATIENT
Start: 2023-05-22

## 2023-05-22 RX ORDER — ZOLPIDEM TARTRATE 5 MG/1
5-10 TABLET ORAL NIGHTLY PRN
Qty: 50 TABLET | Refills: 2 | Status: SHIPPED | OUTPATIENT
Start: 2023-05-22

## 2023-05-22 RX ORDER — LIDOCAINE 50 MG/G
1 PATCH TOPICAL EVERY 24 HOURS
Qty: 90 PATCH | Refills: 1 | Status: SHIPPED | OUTPATIENT
Start: 2023-05-22

## 2023-05-22 NOTE — PROGRESS NOTES
Subjective   Dixie Cervantes is a 72 y.o. female.   Chief Complaint   Patient presents with   • Prediabetes     Last A1C 2/6/23  5.8   • Insomnia   • Anxiety   • Foot Injury     Right       History of Present Illness   Patient is here for follow up for insomnia, anxiety and prediabetes.   3 weeks ago she had right ankle surgery. hardware failure in right ankle, this was removed and bone graft was placed.   Has had increased stress recently with her own health issues and her daughter is under a lot of stress with patient's granddaughter., BP was high for awhile,  Took 1.5 losartan for a couple of weeks. Then back to normal, now taking one tablet. Monitors her BP regularly  States she called to schedule mammogram, a cyst she has had 40 years flared up, PT had worked in that area on a rib,  but has improved. Patient does not think she needs a diagnostic mammogram since this is not new.  Still having left upper back/shoulder pain, requests lidocaine patch refill  Nausea and stomach upset are worse, thinks may be related to rosuvastatin or losartan, although could be related to increased stress. She stopped omeprazole when diagnosed with osteoporosis, is on Prolia injections, recent bone density showed some improvement.  The following portions of the patient's history were reviewed and updated as appropriate: allergies, current medications, past family history, past medical history, past social history, past surgical history and problem list.    Review of Systems   Constitutional: Positive for fatigue. Negative for chills and fever.   Respiratory: Positive for cough (chronic) and shortness of breath (with exertion).    Cardiovascular: Negative for chest pain.   Gastrointestinal: Positive for nausea.   Genitourinary: Negative for difficulty urinating and dysuria.   Musculoskeletal: Positive for arthralgias and gait problem.   Neurological: Negative for dizziness, light-headedness and headaches.   Psychiatric/Behavioral:  "Positive for sleep disturbance (controlled with meds). Negative for self-injury and suicidal ideas. The patient is nervous/anxious.        Objective   Physical Exam  Vitals reviewed.   Constitutional:       Appearance: Normal appearance. She is not ill-appearing or diaphoretic.   HENT:      Head: Normocephalic and atraumatic.   Cardiovascular:      Rate and Rhythm: Normal rate and regular rhythm.      Heart sounds: Normal heart sounds.   Pulmonary:      Effort: Pulmonary effort is normal. No respiratory distress.      Breath sounds: Normal breath sounds.   Neurological:      Mental Status: She is alert and oriented to person, place, and time.      Gait: Gait abnormal (using walker).   Psychiatric:         Mood and Affect: Mood is anxious.         Behavior: Behavior normal.         Thought Content: Thought content normal.         Judgment: Judgment normal.       /80 (BP Location: Right arm, Patient Position: Sitting, Cuff Size: Adult)   Pulse 90   Temp 98.1 °F (36.7 °C) (Temporal)   Ht 172.7 cm (67.99\")   Wt 70.5 kg (155 lb 6.4 oz)   SpO2 97%   BMI 23.63 kg/m²     Assessment & Plan   Diagnoses and all orders for this visit:    1. Prediabetes (Primary)  -     POC Glycosylated Hemoglobin (Hb A1C)    2. Primary insomnia  -     zolpidem (AMBIEN) 5 MG tablet; Take 1-2 tablets by mouth At Night As Needed for Sleep.  Dispense: 50 tablet; Refill: 2    3. Anxiety  -     ALPRAZolam (XANAX) 0.25 MG tablet; Take 0.5 tablets by mouth Daily As Needed for Anxiety.  Dispense: 15 tablet; Refill: 0    4. Dyslipidemia    5. Primary hypertension    6. Thoracic spine pain  -     lidocaine (LIDODERM) 5 %; Place 1 patch on the skin as directed by provider Daily. Remove & Discard patch within 12 hours or as directed by MD  Dispense: 90 patch; Refill: 1    7. Mixed hyperlipidemia  -     rosuvastatin (Crestor) 10 MG tablet; Take 1 tablet by mouth Every Night.  Dispense: 90 tablet; Refill: 1    8. Nausea        Results for orders " placed or performed in visit on 05/22/23   POC Glycosylated Hemoglobin (Hb A1C)    Specimen: Blood   Result Value Ref Range    Hemoglobin A1C 5.9 %    Lot Number [id number]     Expiration Date 1/31/25      Prediabetes is stable, continue watching diet, limit sweets and high carb foods  Ok to cut crestor in half to see if makes a difference in nausea, ok to take omeprazole if no improvement    HTN is controlled, continue losartan  Refilled lidocaine patches, no longer in PT for left thoracic/shoulder pain  Patient was encouraged to keep me informed of any acute changes, lack of improvement, or any new concerning symptoms.  Insomnia and anxiety are controlled with medications  This patient is on a controlled substance which improves symptoms/quality of life and is aware of the risks, benefits and possible side-effects current treatment. The patient denies any medication side-effects at this time. A controlled substance agreement will be obtained or is currently on file. We reviewed required monitoring for controlled substances including but not limited to quarterly follow-up visits, annual depression screening, and urine drug screens to which the patient is agreeable. A MADY report has been or shortly will be reviewed. There are no signs of deviation or misuse.

## 2023-05-24 ENCOUNTER — TRANSCRIBE ORDERS (OUTPATIENT)
Dept: ADMINISTRATIVE | Facility: HOSPITAL | Age: 73
End: 2023-05-24
Payer: MEDICARE

## 2023-05-24 DIAGNOSIS — Z12.31 VISIT FOR SCREENING MAMMOGRAM: Primary | ICD-10-CM

## 2023-07-24 ENCOUNTER — HOSPITAL ENCOUNTER (OUTPATIENT)
Dept: MAMMOGRAPHY | Facility: HOSPITAL | Age: 73
Discharge: HOME | End: 2023-07-24
Admitting: NURSE PRACTITIONER
Payer: MEDICARE

## 2023-07-24 DIAGNOSIS — Z12.31 VISIT FOR SCREENING MAMMOGRAM: ICD-10-CM

## 2023-07-24 PROCEDURE — 77063 BREAST TOMOSYNTHESIS BI: CPT

## 2023-07-24 PROCEDURE — 77063 BREAST TOMOSYNTHESIS BI: CPT | Performed by: RADIOLOGY

## 2023-07-24 PROCEDURE — 77067 SCR MAMMO BI INCL CAD: CPT

## 2023-07-24 PROCEDURE — 77067 SCR MAMMO BI INCL CAD: CPT | Performed by: RADIOLOGY

## 2023-08-08 ENCOUNTER — OFFICE VISIT (OUTPATIENT)
Dept: FAMILY MEDICINE CLINIC | Facility: CLINIC | Age: 73
End: 2023-08-08
Payer: MEDICARE

## 2023-08-08 VITALS
WEIGHT: 157.2 LBS | TEMPERATURE: 99 F | HEIGHT: 68 IN | SYSTOLIC BLOOD PRESSURE: 128 MMHG | DIASTOLIC BLOOD PRESSURE: 80 MMHG | HEART RATE: 68 BPM | BODY MASS INDEX: 23.82 KG/M2 | OXYGEN SATURATION: 98 %

## 2023-08-08 DIAGNOSIS — K21.9 GASTROESOPHAGEAL REFLUX DISEASE, UNSPECIFIED WHETHER ESOPHAGITIS PRESENT: ICD-10-CM

## 2023-08-08 DIAGNOSIS — R50.9 FEVER, UNSPECIFIED FEVER CAUSE: ICD-10-CM

## 2023-08-08 DIAGNOSIS — I10 PRIMARY HYPERTENSION: ICD-10-CM

## 2023-08-08 DIAGNOSIS — E55.9 VITAMIN D DEFICIENCY: ICD-10-CM

## 2023-08-08 DIAGNOSIS — R05.9 COUGH, UNSPECIFIED TYPE: Primary | ICD-10-CM

## 2023-08-08 DIAGNOSIS — N39.3 URINARY, INCONTINENCE, STRESS FEMALE: ICD-10-CM

## 2023-08-08 DIAGNOSIS — E78.5 DYSLIPIDEMIA: ICD-10-CM

## 2023-08-08 DIAGNOSIS — R73.03 PREDIABETES: ICD-10-CM

## 2023-08-08 DIAGNOSIS — Z13.29 THYROID DISORDER SCREENING: ICD-10-CM

## 2023-08-08 DIAGNOSIS — R10.32 LLQ ABDOMINAL PAIN: ICD-10-CM

## 2023-08-08 PROCEDURE — 3079F DIAST BP 80-89 MM HG: CPT | Performed by: NURSE PRACTITIONER

## 2023-08-08 PROCEDURE — 99214 OFFICE O/P EST MOD 30 MIN: CPT | Performed by: NURSE PRACTITIONER

## 2023-08-08 PROCEDURE — 87428 SARSCOV & INF VIR A&B AG IA: CPT | Performed by: NURSE PRACTITIONER

## 2023-08-08 PROCEDURE — 3074F SYST BP LT 130 MM HG: CPT | Performed by: NURSE PRACTITIONER

## 2023-08-08 RX ORDER — AMOXICILLIN AND CLAVULANATE POTASSIUM 875; 125 MG/1; MG/1
1 TABLET, FILM COATED ORAL EVERY 12 HOURS SCHEDULED
Qty: 20 TABLET | Refills: 0 | Status: SHIPPED | OUTPATIENT
Start: 2023-08-08 | End: 2023-08-18

## 2023-08-08 NOTE — PROGRESS NOTES
Subjective   Dixie Cervantes is a 72 y.o. female.   Chief Complaint   Patient presents with    Fever    Cough     Pt. States she Noticed about a week ago feeling sick.    Earache    Nausea    Anorexia    Abdominal Pain       Fever   Associated symptoms include abdominal pain (soreness across abdomen), coughing, ear pain and nausea.   Cough  Associated symptoms include ear pain, a fever (100.5 last night) and shortness of breath (with exertion).   Earache   Associated symptoms include abdominal pain (soreness across abdomen) and coughing.   Nausea  Associated symptoms include abdominal pain (soreness across abdomen), coughing, fatigue, a fever (100.5 last night) and nausea.   Anorexia  Associated symptoms include abdominal pain (soreness across abdomen), coughing, fatigue, a fever (100.5 last night) and nausea.   Abdominal Pain  Associated symptoms include constipation, a fever (100.5 last night) and nausea.    Tightness around abdominal area, had taken omeprazole for worsening acid reflux, read side effects and felt like this contributed to symptoms so stopped taking ,symptoms did not improve    The following portions of the patient's history were reviewed and updated as appropriate: allergies, current medications, past family history, past medical history, past social history, past surgical history, and problem list.    Review of Systems   Constitutional:  Positive for appetite change (decreased), fatigue and fever (100.5 last night).   HENT:  Positive for ear pain.    Respiratory:  Positive for cough and shortness of breath (with exertion).    Gastrointestinal:  Positive for abdominal pain (soreness across abdomen), constipation and nausea.     Objective   Physical Exam  Vitals reviewed.   Constitutional:       Appearance: Normal appearance. She is ill-appearing.   HENT:      Head: Normocephalic and atraumatic.      Ears:      Comments: Opaque TMs     Mouth/Throat:      Pharynx: Posterior oropharyngeal erythema  "present.   Eyes:      General: No scleral icterus.  Cardiovascular:      Rate and Rhythm: Normal rate and regular rhythm.      Heart sounds: Normal heart sounds.   Pulmonary:      Effort: Pulmonary effort is normal. No respiratory distress.      Breath sounds: Normal breath sounds.   Abdominal:      General: Bowel sounds are normal. There is no distension.      Palpations: Abdomen is soft.      Tenderness: There is abdominal tenderness (LLQ). There is no guarding or rebound.   Musculoskeletal:      Right lower leg: No edema.      Left lower leg: No edema.   Neurological:      Mental Status: She is alert and oriented to person, place, and time.   Psychiatric:         Thought Content: Thought content normal.         Judgment: Judgment normal.     /80 (BP Location: Left arm, Patient Position: Sitting, Cuff Size: Adult)   Pulse 68   Temp 99 øF (37.2 øC) (Oral)   Ht 172.7 cm (67.99\")   Wt 71.3 kg (157 lb 3.2 oz)   SpO2 98%   BMI 23.91 kg/mý     Assessment & Plan   Problems Addressed this Visit          Cardiac and Vasculature    Primary hypertension    Dyslipidemia    Relevant Orders    Comprehensive Metabolic Panel    Lipid Panel       Endocrine and Metabolic    Prediabetes    Relevant Orders    Hemoglobin A1c       Gastrointestinal Abdominal     GERD (gastroesophageal reflux disease)    Relevant Orders    CBC Auto Differential       Genitourinary and Reproductive     Urinary, incontinence, stress female     Other Visit Diagnoses       Cough, unspecified type    -  Primary    Relevant Orders    POCT SARS-CoV-2 Antigen AMBER + Flu    Thyroid disorder screening        Relevant Orders    TSH Rfx On Abnormal To Free T4    Vitamin D deficiency        Relevant Orders    Vitamin D,25-Hydroxy    LLQ abdominal pain        Relevant Medications    amoxicillin-clavulanate (AUGMENTIN) 875-125 MG per tablet    Fever, unspecified fever cause        Relevant Medications    amoxicillin-clavulanate (AUGMENTIN) 875-125 MG per " tablet          Diagnoses         Codes Comments    Cough, unspecified type    -  Primary ICD-10-CM: R05.9  ICD-9-CM: 786.2     Primary hypertension     ICD-10-CM: I10  ICD-9-CM: 401.9     Dyslipidemia     ICD-10-CM: E78.5  ICD-9-CM: 272.4     Gastroesophageal reflux disease, unspecified whether esophagitis present     ICD-10-CM: K21.9  ICD-9-CM: 530.81     Urinary, incontinence, stress female     ICD-10-CM: N39.3  ICD-9-CM: 625.6     Prediabetes     ICD-10-CM: R73.03  ICD-9-CM: 790.29     Thyroid disorder screening     ICD-10-CM: Z13.29  ICD-9-CM: V77.0     Vitamin D deficiency     ICD-10-CM: E55.9  ICD-9-CM: 268.9     LLQ abdominal pain     ICD-10-CM: R10.32  ICD-9-CM: 789.04     Fever, unspecified fever cause     ICD-10-CM: R50.9  ICD-9-CM: 780.60           Results for orders placed or performed in visit on 08/08/23   POCT SARS-CoV-2 Antigen AMBER + Flu    Specimen: Swab   Result Value Ref Range    SARS Antigen Not Detected Not Detected, Presumptive Negative    Influenza A Antigen AMBER Not Detected Not Detected    Influenza B Antigen AMBER Not Detected Not Detected    Internal Control Passed Passed    Lot Number [id number]     Expiration Date 3/22/24        Covid and flu tests negative in office today  Patient declines CT scan of abdomen, Augmentin prescribed  Possible diverticulitis vs viral gastritis  vs sinusitis with fever  Screening labs ordered to evaluate chronic conditions. I will contact patient regarding test results and provide instructions regarding any necessary changes in plan of care.  Continue pepcid for GERD  Screening labs ordered to evaluate chronic conditions. I will contact patient regarding test results and provide instructions regarding any necessary changes in plan of care.  Have labs drawn prior to annual exam but when illness has resolved.  Patient was encouraged to keep me informed of any acute changes, lack of improvement, or any new concerning symptoms.

## 2023-08-17 ENCOUNTER — LAB (OUTPATIENT)
Dept: LAB | Facility: HOSPITAL | Age: 73
End: 2023-08-17
Payer: MEDICARE

## 2023-08-17 DIAGNOSIS — Z13.29 THYROID DISORDER SCREENING: ICD-10-CM

## 2023-08-17 DIAGNOSIS — R73.03 PREDIABETES: ICD-10-CM

## 2023-08-17 DIAGNOSIS — E78.5 DYSLIPIDEMIA: ICD-10-CM

## 2023-08-17 DIAGNOSIS — K21.9 GASTROESOPHAGEAL REFLUX DISEASE, UNSPECIFIED WHETHER ESOPHAGITIS PRESENT: ICD-10-CM

## 2023-08-17 DIAGNOSIS — E55.9 VITAMIN D DEFICIENCY: ICD-10-CM

## 2023-08-17 LAB
25(OH)D3 SERPL-MCNC: 46.3 NG/ML (ref 30–100)
ALBUMIN SERPL-MCNC: 4.5 G/DL (ref 3.5–5.2)
ALBUMIN/GLOB SERPL: 2 G/DL
ALP SERPL-CCNC: 50 U/L (ref 39–117)
ALT SERPL W P-5'-P-CCNC: 12 U/L (ref 1–33)
ANION GAP SERPL CALCULATED.3IONS-SCNC: 9.5 MMOL/L (ref 5–15)
AST SERPL-CCNC: 17 U/L (ref 1–32)
BASOPHILS # BLD AUTO: 0.03 10*3/MM3 (ref 0–0.2)
BASOPHILS NFR BLD AUTO: 0.4 % (ref 0–1.5)
BILIRUB SERPL-MCNC: 0.6 MG/DL (ref 0–1.2)
BUN SERPL-MCNC: 11 MG/DL (ref 8–23)
BUN/CREAT SERPL: 12.1 (ref 7–25)
CALCIUM SPEC-SCNC: 9.8 MG/DL (ref 8.6–10.5)
CHLORIDE SERPL-SCNC: 106 MMOL/L (ref 98–107)
CHOLEST SERPL-MCNC: 115 MG/DL (ref 0–200)
CO2 SERPL-SCNC: 26.5 MMOL/L (ref 22–29)
CREAT SERPL-MCNC: 0.91 MG/DL (ref 0.57–1)
DEPRECATED RDW RBC AUTO: 39.4 FL (ref 37–54)
EGFRCR SERPLBLD CKD-EPI 2021: 66.8 ML/MIN/1.73
EOSINOPHIL # BLD AUTO: 0.11 10*3/MM3 (ref 0–0.4)
EOSINOPHIL NFR BLD AUTO: 1.6 % (ref 0.3–6.2)
ERYTHROCYTE [DISTWIDTH] IN BLOOD BY AUTOMATED COUNT: 12 % (ref 12.3–15.4)
EXPIRATION DATE: NORMAL
FLUAV AG UPPER RESP QL IA.RAPID: NOT DETECTED
FLUBV AG UPPER RESP QL IA.RAPID: NOT DETECTED
GLOBULIN UR ELPH-MCNC: 2.3 GM/DL
GLUCOSE SERPL-MCNC: 115 MG/DL (ref 65–99)
HBA1C MFR BLD: 6.3 % (ref 4.8–5.6)
HCT VFR BLD AUTO: 40.8 % (ref 34–46.6)
HDLC SERPL-MCNC: 49 MG/DL (ref 40–60)
HGB BLD-MCNC: 13.7 G/DL (ref 12–15.9)
IMM GRANULOCYTES # BLD AUTO: 0.01 10*3/MM3 (ref 0–0.05)
IMM GRANULOCYTES NFR BLD AUTO: 0.1 % (ref 0–0.5)
INTERNAL CONTROL: NORMAL
LDLC SERPL CALC-MCNC: 46 MG/DL (ref 0–100)
LDLC/HDLC SERPL: 0.9 {RATIO}
LYMPHOCYTES # BLD AUTO: 2.38 10*3/MM3 (ref 0.7–3.1)
LYMPHOCYTES NFR BLD AUTO: 33.9 % (ref 19.6–45.3)
Lab: NORMAL
MCH RBC QN AUTO: 30.3 PG (ref 26.6–33)
MCHC RBC AUTO-ENTMCNC: 33.6 G/DL (ref 31.5–35.7)
MCV RBC AUTO: 90.3 FL (ref 79–97)
MONOCYTES # BLD AUTO: 0.74 10*3/MM3 (ref 0.1–0.9)
MONOCYTES NFR BLD AUTO: 10.5 % (ref 5–12)
NEUTROPHILS NFR BLD AUTO: 3.76 10*3/MM3 (ref 1.7–7)
NEUTROPHILS NFR BLD AUTO: 53.5 % (ref 42.7–76)
NRBC BLD AUTO-RTO: 0.1 /100 WBC (ref 0–0.2)
PLATELET # BLD AUTO: 260 10*3/MM3 (ref 140–450)
PMV BLD AUTO: 10.4 FL (ref 6–12)
POTASSIUM SERPL-SCNC: 4.7 MMOL/L (ref 3.5–5.2)
PROT SERPL-MCNC: 6.8 G/DL (ref 6–8.5)
RBC # BLD AUTO: 4.52 10*6/MM3 (ref 3.77–5.28)
SARS-COV-2 AG UPPER RESP QL IA.RAPID: NOT DETECTED
SODIUM SERPL-SCNC: 142 MMOL/L (ref 136–145)
TRIGL SERPL-MCNC: 110 MG/DL (ref 0–150)
TSH SERPL DL<=0.05 MIU/L-ACNC: 3.28 UIU/ML (ref 0.27–4.2)
VLDLC SERPL-MCNC: 20 MG/DL (ref 5–40)
WBC NRBC COR # BLD: 7.03 10*3/MM3 (ref 3.4–10.8)

## 2023-08-17 PROCEDURE — 80061 LIPID PANEL: CPT

## 2023-08-17 PROCEDURE — 83036 HEMOGLOBIN GLYCOSYLATED A1C: CPT

## 2023-08-17 PROCEDURE — 84443 ASSAY THYROID STIM HORMONE: CPT

## 2023-08-17 PROCEDURE — 85025 COMPLETE CBC W/AUTO DIFF WBC: CPT

## 2023-08-17 PROCEDURE — 80053 COMPREHEN METABOLIC PANEL: CPT

## 2023-08-17 PROCEDURE — 82306 VITAMIN D 25 HYDROXY: CPT

## 2023-08-24 ENCOUNTER — OFFICE VISIT (OUTPATIENT)
Dept: FAMILY MEDICINE CLINIC | Facility: CLINIC | Age: 73
End: 2023-08-24
Payer: MEDICARE

## 2023-08-24 VITALS
OXYGEN SATURATION: 98 % | SYSTOLIC BLOOD PRESSURE: 128 MMHG | BODY MASS INDEX: 23.4 KG/M2 | HEART RATE: 72 BPM | WEIGHT: 154.4 LBS | DIASTOLIC BLOOD PRESSURE: 76 MMHG | HEIGHT: 68 IN | TEMPERATURE: 98.6 F

## 2023-08-24 DIAGNOSIS — R05.3 CHRONIC COUGH: ICD-10-CM

## 2023-08-24 DIAGNOSIS — R73.03 PREDIABETES: ICD-10-CM

## 2023-08-24 DIAGNOSIS — F41.9 ANXIETY: ICD-10-CM

## 2023-08-24 DIAGNOSIS — M81.0 AGE-RELATED OSTEOPOROSIS WITHOUT CURRENT PATHOLOGICAL FRACTURE: ICD-10-CM

## 2023-08-24 DIAGNOSIS — F51.01 PRIMARY INSOMNIA: ICD-10-CM

## 2023-08-24 DIAGNOSIS — Z00.00 MEDICARE ANNUAL WELLNESS VISIT, SUBSEQUENT: Primary | ICD-10-CM

## 2023-08-24 DIAGNOSIS — H52.532 SPASM OF EYE MUSCLE, LEFT: ICD-10-CM

## 2023-08-24 DIAGNOSIS — I10 PRIMARY HYPERTENSION: ICD-10-CM

## 2023-08-24 DIAGNOSIS — E78.2 MIXED HYPERLIPIDEMIA: ICD-10-CM

## 2023-08-24 DIAGNOSIS — R20.0 FINGER NUMBNESS: ICD-10-CM

## 2023-08-24 DIAGNOSIS — K21.9 GASTROESOPHAGEAL REFLUX DISEASE WITHOUT ESOPHAGITIS: ICD-10-CM

## 2023-08-24 DIAGNOSIS — H91.93 BILATERAL HEARING LOSS, UNSPECIFIED HEARING LOSS TYPE: ICD-10-CM

## 2023-08-24 PROCEDURE — 3078F DIAST BP <80 MM HG: CPT | Performed by: NURSE PRACTITIONER

## 2023-08-24 PROCEDURE — 1160F RVW MEDS BY RX/DR IN RCRD: CPT | Performed by: NURSE PRACTITIONER

## 2023-08-24 PROCEDURE — G0439 PPPS, SUBSEQ VISIT: HCPCS | Performed by: NURSE PRACTITIONER

## 2023-08-24 PROCEDURE — 3074F SYST BP LT 130 MM HG: CPT | Performed by: NURSE PRACTITIONER

## 2023-08-24 PROCEDURE — 1159F MED LIST DOCD IN RCRD: CPT | Performed by: NURSE PRACTITIONER

## 2023-08-24 RX ORDER — LOSARTAN POTASSIUM 50 MG/1
50 TABLET ORAL DAILY
Qty: 90 TABLET | Refills: 1 | Status: SHIPPED | OUTPATIENT
Start: 2023-08-24

## 2023-08-24 RX ORDER — FAMOTIDINE 20 MG/1
20 TABLET, FILM COATED ORAL 2 TIMES DAILY
Qty: 180 TABLET | Refills: 1 | Status: SHIPPED | OUTPATIENT
Start: 2023-08-24

## 2023-08-24 RX ORDER — ROSUVASTATIN CALCIUM 10 MG/1
10 TABLET, COATED ORAL NIGHTLY
Qty: 90 TABLET | Refills: 1 | Status: SHIPPED | OUTPATIENT
Start: 2023-08-24

## 2023-08-24 RX ORDER — ZOLPIDEM TARTRATE 5 MG/1
5-10 TABLET ORAL NIGHTLY PRN
Qty: 50 TABLET | Refills: 2 | Status: SHIPPED | OUTPATIENT
Start: 2023-08-24

## 2023-08-24 NOTE — PROGRESS NOTES
The ABCs of the Annual Wellness Visit  Subsequent Medicare Wellness Visit    Subjective    Dixie Cervantes is a 72 y.o. female who presents for a Subsequent Medicare Wellness Visit.  Had surgery again in May on right ankle, screw removed; had bone graft; wears a boot on right leg for added stability, followed by ortho;   Does a lot of swimming for exercise.    The following portions of the patient's history were reviewed and   updated as appropriate: allergies, current medications, past family history, past medical history, past social history, past surgical history, and problem list.    Compared to one year ago, the patient feels her physical   health is worse. Left eye pulling sensation toward the center occ, saw ophthalmologist, exam normal ; has chronic fatigue,     Compared to one year ago, the patient feels her mental   health is the same.    Recent Hospitalizations:  She was not admitted to the hospital during the last year.       Current Medical Providers:  Patient Care Team:  Linda Adhikari APRN as PCP - General (Nurse Practitioner)  Camden Franco MD (Neurosurgery)  Jason Pino MD as Consulting Physician (Pulmonary Disease)  Fady Lane MD as Consulting Physician (Otolaryngology)  Clive Chin (Endocrinology)    Outpatient Medications Prior to Visit   Medication Sig Dispense Refill    ALPRAZolam (XANAX) 0.25 MG tablet Take 0.5 tablets by mouth Daily As Needed for Anxiety. 15 tablet 0    denosumab (Prolia) 60 MG/ML solution prefilled syringe syringe Prolia 60 mg/mL subcutaneous syringe   60 mg subcutaneous every 6 months      diclofenac (VOLTAREN) 1 % gel gel As Needed.      glucosamine-chondroitin 500-400 MG capsule capsule Take 1 capsule by mouth 3 (Three) Times a Day With Meals.      lidocaine (LIDODERM) 5 % Place 1 patch on the skin as directed by provider Daily. Remove & Discard patch within 12 hours or as directed by MD 90 patch 1    Multiple  Vitamins-Minerals (MULTI VITAMIN/MINERALS) tablet Take 1 tablet by mouth.      nystatin (MYCOSTATIN) 944512 UNIT/GM cream Apply  topically to the appropriate area as directed As Needed (yeast rash). 30 g 2    famotidine (Pepcid) 20 MG tablet Take 1 tablet by mouth 2 (Two) Times a Day. 180 tablet 1    losartan (COZAAR) 50 MG tablet TAKE 1 TABLET BY MOUTH DAILY 90 tablet 1    rosuvastatin (Crestor) 10 MG tablet Take 1 tablet by mouth Every Night. 90 tablet 1    zolpidem (AMBIEN) 5 MG tablet Take 1-2 tablets by mouth At Night As Needed for Sleep. 50 tablet 2     No facility-administered medications prior to visit.       No opioid medication identified on active medication list. I have reviewed chart for other potential  high risk medication/s and harmful drug interactions in the elderly.        Aspirin is not on active medication list.  Aspirin use is not indicated based on review of current medical condition/s. Risk of harm outweighs potential benefits.  .    Patient Active Problem List   Diagnosis    Vestibular vertigo    Urinary, incontinence, stress female    Parasitic infection    Irritable bowel syndrome (IBS)    Inverted nipple    Insomnia    History of bladder infections    GERD (gastroesophageal reflux disease)    OCTAVIO (generalized anxiety disorder)    External otitis    Esophageal spasm    Diverticulosis    Chronic low back pain    Chronic cough    Age related osteoporosis    Closed nondisplaced fracture of lateral malleolus of right fibula    Anxiety    Primary hypertension    Dyslipidemia    Prediabetes    Mixed hyperlipidemia     Advance Care Planning   Advance Care Planning     Advance Directive is on file.  ACP discussion was held with the patient during this visit. Patient has an advance directive in EMR which is still valid.      Objective    Vitals:    08/24/23 1119   BP: 128/76   BP Location: Right arm   Patient Position: Sitting   Cuff Size: Adult   Pulse: 72   Temp: 98.6 øF (37 øC)   TempSrc: Oral  "  SpO2: 98%   Weight: 70 kg (154 lb 6.4 oz)   Height: 172.7 cm (67.99\")   PainSc: 0-No pain     Estimated body mass index is 23.48 kg/mý as calculated from the following:    Height as of this encounter: 172.7 cm (67.99\").    Weight as of this encounter: 70 kg (154 lb 6.4 oz).    BMI is within normal parameters. No other follow-up for BMI required.      Does the patient have evidence of cognitive impairment? No    Lab Results   Component Value Date    TRIG 110 2023    HDL 49 2023    LDL 46 2023    VLDL 20 2023    HGBA1C 6.30 (H) 2023        HEALTH RISK ASSESSMENT    Smoking Status:  Social History     Tobacco Use   Smoking Status Never    Passive exposure: Never   Smokeless Tobacco Never     Alcohol Consumption:  Social History     Substance and Sexual Activity   Alcohol Use Yes    Comment: 2-3 drinks a month     Fall Risk Screen:    ROXANA Fall Risk Assessment was completed, and patient is at LOW risk for falls.Assessment completed on:2023    Depression Screenin/24/2023    11:27 AM   PHQ-2/PHQ-9 Depression Screening   Little Interest or Pleasure in Doing Things 0-->not at all   Feeling Down, Depressed or Hopeless 0-->not at all   PHQ-9: Brief Depression Severity Measure Score 0       Health Habits and Functional and Cognitive Screenin/24/2023    11:26 AM   Functional & Cognitive Status   Do you have difficulty preparing food and eating? No   Do you have difficulty bathing yourself, getting dressed or grooming yourself? No   Do you have difficulty using the toilet? No   Do you have difficulty moving around from place to place? No   Do you have trouble with steps or getting out of a bed or a chair? No   Current Diet Well Balanced Diet   Dental Exam Up to date   Eye Exam Up to date   Exercise (times per week) 4 times per week   Current Exercises Include Walking;Swimming   Do you need help using the phone?  No   Are you deaf or do you have serious difficulty hearing?  " Yes   Do you need help to go to places out of walking distance? Yes   Do you need help shopping? Yes   Do you need help preparing meals?  No   Do you need help with housework?  No   Do you need help with laundry? No   Do you need help taking your medications? No   Do you need help managing money? No   Do you ever drive or ride in a car without wearing a seat belt? No       Age-appropriate Screening Schedule:  Refer to the list below for future screening recommendations based on patient's age, sex and/or medical conditions. Orders for these recommended tests are listed in the plan section. The patient has been provided with a written plan.    Health Maintenance   Topic Date Due    ZOSTER VACCINE (2 of 3) 02/26/2014    ANNUAL WELLNESS VISIT  08/04/2023    INFLUENZA VACCINE  10/01/2023    TDAP/TD VACCINES (2 - Td or Tdap) 01/01/2024    MAMMOGRAM  07/24/2024    LIPID PANEL  08/17/2024    DXA SCAN  03/15/2025    COLORECTAL CANCER SCREENING  10/05/2028    HEPATITIS C SCREENING  Completed    COVID-19 Vaccine  Completed    Pneumococcal Vaccine 65+  Completed                  CMS Preventative Services Quick Reference  Risk Factors Identified During Encounter  Immunizations Discussed/Encouraged: Influenza, Shingrix, and COVID19  Polypharmacy: Medication List reviewed and Medications are appropriate for patient  The above risks/problems have been discussed with the patient.  Pertinent information has been shared with the patient in the After Visit Summary.  An After Visit Summary and PPPS were made available to the patient.    Follow Up:   Next Medicare Wellness visit to be scheduled in 1 year.       Additional E&M Note during same encounter follows:  Patient has multiple medical problems which are significant and separately identifiable that require additional work above and beyond the Medicare Wellness Visit.      Chief Complaint  Medicare Wellness-subsequent (Prediabetes Last A1C 8/17/23 6.3), Hypertension, and  Anxiety    Subjective        Hypertension  Associated symptoms include anxiety and shortness of breath (with exertion). Pertinent negatives include no chest pain or palpitations.   Anxiety  Symptoms include dizziness (occ vertigo), nervous/anxious behavior and shortness of breath (with exertion). Patient reports no chest pain, nausea, palpitations or suicidal ideas.       Dixie Cervantes is also being seen today for insomnia  On Ambien for several years, takes xanax prn for anxiety  Review of Systems   Constitutional:  Negative for appetite change, chills, diaphoresis, fatigue and fever.   HENT:  Positive for ear pain (right), hearing loss (some) and postnasal drip. Negative for congestion.    Eyes:  Positive for visual disturbance (intermittent 3x's pulling sensation of left eye). Negative for photophobia, pain, redness and itching.   Respiratory:  Positive for cough ( morning) and shortness of breath (with exertion). Negative for apnea and wheezing.    Cardiovascular:  Negative for chest pain, palpitations and leg swelling.   Gastrointestinal:  Negative for abdominal pain, blood in stool, constipation, diarrhea (occ loose stools), nausea and vomiting.   Endocrine: Positive for polydipsia. Negative for cold intolerance, heat intolerance and polyuria.   Genitourinary:  Positive for frequency. Negative for difficulty urinating, dysuria and vaginal bleeding.   Musculoskeletal:  Positive for arthralgias (worse in hands) and back pain (occ).   Skin:  Negative for color change, pallor, rash and wound.        Right ear lesion; sees derm   Allergic/Immunologic: Positive for environmental allergies.   Neurological:  Positive for dizziness (occ vertigo) and numbness (left elbow and left 5th finger). Negative for tremors, seizures and syncope.   Psychiatric/Behavioral:  Positive for sleep disturbance (ambien helps). Negative for dysphoric mood, self-injury and suicidal ideas. The patient is nervous/anxious.      Objective  "  Vital Signs:  /76 (BP Location: Right arm, Patient Position: Sitting, Cuff Size: Adult)   Pulse 72   Temp 98.6 øF (37 øC) (Oral)   Ht 172.7 cm (67.99\")   Wt 70 kg (154 lb 6.4 oz)   SpO2 98%   BMI 23.48 kg/mý     Physical Exam  Vitals and nursing note reviewed.   Constitutional:       General: She is not in acute distress.     Appearance: Normal appearance. She is well-developed. She is not diaphoretic.   HENT:      Head: Normocephalic and atraumatic.      Right Ear: External ear normal.      Left Ear: External ear normal.      Ears:        Comments: Opaque TMs     Nose: Nose normal.   Eyes:      General: No scleral icterus.        Right eye: No discharge.         Left eye: No discharge.      Extraocular Movements: Extraocular movements intact.      Conjunctiva/sclera: Conjunctivae normal.      Pupils: Pupils are equal, round, and reactive to light.   Neck:      Thyroid: No thyromegaly.      Vascular: No carotid bruit or JVD.      Trachea: No tracheal deviation.   Cardiovascular:      Rate and Rhythm: Normal rate and regular rhythm.      Heart sounds: Normal heart sounds. No murmur heard.    No friction rub. No gallop.   Pulmonary:      Effort: Pulmonary effort is normal. No respiratory distress.      Breath sounds: Normal breath sounds. No stridor. No wheezing or rales.   Chest:      Chest wall: No tenderness.   Breasts:     Right: No swelling, bleeding, inverted nipple, mass, nipple discharge, skin change or tenderness.      Left: Inverted nipple (this is chronic finding) present. No swelling, bleeding, mass, nipple discharge, skin change or tenderness.   Abdominal:      General: Bowel sounds are normal. There is no distension.      Palpations: Abdomen is soft. There is no mass.      Tenderness: There is no abdominal tenderness. There is no guarding or rebound.      Hernia: No hernia is present.   Musculoskeletal:         General: No tenderness or deformity. Normal range of motion.      Cervical back: " Normal range of motion and neck supple.      Comments: Right leg in ortho boot   Lymphadenopathy:      Cervical: No cervical adenopathy.      Upper Body:      Right upper body: No supraclavicular, axillary or pectoral adenopathy.      Left upper body: No supraclavicular, axillary or pectoral adenopathy.   Skin:     General: Skin is warm and dry.      Coloration: Skin is not pale.      Findings: Lesion (right ear) present. No erythema or rash.      Comments: Left thumb spoon nail   Neurological:      Mental Status: She is alert and oriented to person, place, and time.      Cranial Nerves: No cranial nerve deficit.      Motor: No abnormal muscle tone.      Coordination: Coordination normal.      Deep Tendon Reflexes: Reflexes are normal and symmetric. Reflexes normal.   Psychiatric:         Behavior: Behavior normal.         Thought Content: Thought content normal.         Judgment: Judgment normal.                       Assessment and Plan   Diagnoses and all orders for this visit:    1. Medicare annual wellness visit, subsequent (Primary)    2. Primary insomnia  -     zolpidem (AMBIEN) 5 MG tablet; Take 1-2 tablets by mouth At Night As Needed for Sleep.  Dispense: 50 tablet; Refill: 2    3. Chronic cough  -     famotidine (Pepcid) 20 MG tablet; Take 1 tablet by mouth 2 (Two) Times a Day.  Dispense: 180 tablet; Refill: 1    4. Gastroesophageal reflux disease without esophagitis  -     famotidine (Pepcid) 20 MG tablet; Take 1 tablet by mouth 2 (Two) Times a Day.  Dispense: 180 tablet; Refill: 1    5. Mixed hyperlipidemia  -     rosuvastatin (Crestor) 10 MG tablet; Take 1 tablet by mouth Every Night.  Dispense: 90 tablet; Refill: 1    6. Primary hypertension  -     losartan (COZAAR) 50 MG tablet; Take 1 tablet by mouth Daily.  Dispense: 90 tablet; Refill: 1    7. Bilateral hearing loss, unspecified hearing loss type  -     Ambulatory Referral to Audiology    8. Finger numbness    9. Spasm of eye muscle, left    10.  Age-related osteoporosis without current pathological fracture    11. Prediabetes    12. Anxiety     blood cell counts, kidney (creatinine, GFR), liver (ALT, AST), thyroid (TSH), vit D and total cholesterol are all within normal range.  Continue statin for dyslipidemia  A1c in prediabetes range, 6.3  Hypertension is controlled, continue losartan  Referred to audiology for hearing test  Follow-up with dermatology about right ear lesion and left thumb Spoon nail.   Patient will continue to monitor changes and left eye muscle and pulling sensation, consider imaging     Adequate calcium, vitamin D and weight bearing exercise are important to reduce risk of osteoporosis. Fall prevention is also important to reduce the risk of fracture.   Reduce fall risk with regular vision checks, medication review, osteoporosis screening, currently receiving osteoporosis treatment.  Continue with strength and balance training. Remove tripping hazards, avoid standing too quickly, limit alcohol. Eat a healthy well-balanced diet, stay hydrated.  Nutrition and activity goals reviewed including: mainly water to drink, limit white flour/processed sugar, and processed foods, choose fresh fruits, vegetables, fish, lean meats,high fiber carbs, exercise  working toward 150 mins cardio per week, weight training 2x/week.    This patient is on a controlled substance which improves symptoms/quality of life and is aware of the risks, benefits and possible side-effects current treatment. The patient denies any medication side-effects at this time. A controlled substance agreement will be obtained or is currently on file. We reviewed required monitoring for controlled substances including but not limited to quarterly follow-up visits, annual depression screening, and urine drug screens to which the patient is agreeable. A MADY report has been or shortly will be reviewed. There are no signs of deviation or misuse.            Follow Up   Return in  about 3 months (around 11/24/2023) for Recheck; prediabetes, insomnia.  Patient was given instructions and counseling regarding her condition or for health maintenance advice. Please see specific information pulled into the AVS if appropriate.

## 2023-11-27 ENCOUNTER — OFFICE VISIT (OUTPATIENT)
Dept: FAMILY MEDICINE CLINIC | Facility: CLINIC | Age: 73
End: 2023-11-27
Payer: MEDICARE

## 2023-11-27 VITALS
WEIGHT: 155.8 LBS | TEMPERATURE: 98.2 F | HEIGHT: 68 IN | HEART RATE: 82 BPM | BODY MASS INDEX: 23.61 KG/M2 | OXYGEN SATURATION: 99 % | DIASTOLIC BLOOD PRESSURE: 76 MMHG | SYSTOLIC BLOOD PRESSURE: 122 MMHG

## 2023-11-27 DIAGNOSIS — R73.03 PREDIABETES: Primary | ICD-10-CM

## 2023-11-27 DIAGNOSIS — R10.13 EPIGASTRIC PAIN: ICD-10-CM

## 2023-11-27 DIAGNOSIS — K21.9 GASTROESOPHAGEAL REFLUX DISEASE, UNSPECIFIED WHETHER ESOPHAGITIS PRESENT: ICD-10-CM

## 2023-11-27 DIAGNOSIS — L98.8 SKIN LESION OF BREAST: ICD-10-CM

## 2023-11-27 DIAGNOSIS — Z51.81 THERAPEUTIC DRUG MONITORING: ICD-10-CM

## 2023-11-27 DIAGNOSIS — F41.1 GAD (GENERALIZED ANXIETY DISORDER): ICD-10-CM

## 2023-11-27 DIAGNOSIS — F51.01 PRIMARY INSOMNIA: ICD-10-CM

## 2023-11-27 LAB
EXPIRATION DATE: ABNORMAL
HBA1C MFR BLD: 5.9 % (ref 4.5–5.7)
Lab: ABNORMAL

## 2023-11-27 RX ORDER — PANTOPRAZOLE SODIUM 40 MG/1
40 TABLET, DELAYED RELEASE ORAL DAILY
Qty: 90 TABLET | Refills: 0 | Status: SHIPPED | OUTPATIENT
Start: 2023-11-27

## 2023-11-27 RX ORDER — ZOLPIDEM TARTRATE 5 MG/1
5-10 TABLET ORAL NIGHTLY PRN
Qty: 50 TABLET | Refills: 2 | Status: SHIPPED | OUTPATIENT
Start: 2023-11-27

## 2023-11-28 ENCOUNTER — TELEPHONE (OUTPATIENT)
Dept: FAMILY MEDICINE CLINIC | Facility: CLINIC | Age: 73
End: 2023-11-28
Payer: MEDICARE

## 2023-11-28 NOTE — TELEPHONE ENCOUNTER
Caller: Dixie Cervantes    Relationship: Self    Best call back number: [phone]       Who are you requesting to speak with (clinical staff, provider,  specific staff member): CLINICAL      What was the call regarding: SPOKE WITH ENDOCRINOLOGIST,  DOES NOT THINK PROLIA IS CAUSING SYMPTOMS, HE STATES SHE SHOULD START THE PANTOPRAZOLE AFTER THE H PYLORIC TESTING, WHICH SHE WILL DO NEXT WEEK . SHOULD CONSIDER DOING THE SCOPE. HER PLAN IS TO TAKE THE MEDICATION AND IF SYMPTOMS DO NOT CLEAR UP SHE WILL DO THE SCOPE, IF THEY CLEAR UP BUT RETURN AFTER MEDICATION IS COMPLETED SHE WILL DISCUSS  THE SCOPE, IF THAT IS OKAY WITH DOTTIE LEE    Is it okay if the provider responds through [employer]: NO, CAN LEAVE A MESSAGE

## 2023-11-30 LAB
DRUGS UR: NORMAL
Lab: NORMAL

## 2023-12-05 ENCOUNTER — LAB (OUTPATIENT)
Dept: LAB | Facility: HOSPITAL | Age: 73
End: 2023-12-05
Payer: MEDICARE

## 2023-12-05 DIAGNOSIS — R10.13 EPIGASTRIC PAIN: ICD-10-CM

## 2023-12-05 PROCEDURE — 83013 H PYLORI (C-13) BREATH: CPT

## 2023-12-06 LAB — UREA BREATH TEST QL: NEGATIVE

## 2024-01-03 ENCOUNTER — TELEPHONE (OUTPATIENT)
Dept: FAMILY MEDICINE CLINIC | Facility: CLINIC | Age: 74
End: 2024-01-03
Payer: MEDICARE

## 2024-01-03 DIAGNOSIS — K21.9 GASTROESOPHAGEAL REFLUX DISEASE, UNSPECIFIED WHETHER ESOPHAGITIS PRESENT: Primary | ICD-10-CM

## 2024-01-03 DIAGNOSIS — Z12.11 COLON CANCER SCREENING: ICD-10-CM

## 2024-01-03 NOTE — TELEPHONE ENCOUNTER
Caller: Dixie Cervantes    Relationship to patient: Self    Best call back number: [phone]    Patient is needing: PATIENT STATED THAT SHE WOULD LIKE TO MOVE FORWARD WITH STAT GASTROESOPHAGEAL SCOPE ALONG WITH ROUTINE COLONOSCOPY AS DISCUSSED WITH PCP @  OFFICE    PLEASE ADVISE

## 2024-02-13 ENCOUNTER — TELEPHONE (OUTPATIENT)
Dept: GASTROENTEROLOGY | Facility: CLINIC | Age: 74
End: 2024-02-13
Payer: MEDICARE

## 2024-02-13 RX ORDER — SODIUM, POTASSIUM,MAG SULFATES 17.5-3.13G
2 SOLUTION, RECONSTITUTED, ORAL ORAL TAKE AS DIRECTED
Qty: 354 ML | Refills: 0 | Status: SHIPPED | OUTPATIENT
Start: 2024-02-13

## 2024-02-15 ENCOUNTER — OUTSIDE FACILITY SERVICE (OUTPATIENT)
Dept: GASTROENTEROLOGY | Facility: CLINIC | Age: 74
End: 2024-02-15
Payer: MEDICARE

## 2024-02-15 PROCEDURE — 88305 TISSUE EXAM BY PATHOLOGIST: CPT | Performed by: INTERNAL MEDICINE

## 2024-02-15 RX ORDER — PANTOPRAZOLE SODIUM 40 MG/1
40 TABLET, DELAYED RELEASE ORAL 2 TIMES DAILY
Qty: 60 TABLET | Refills: 11 | Status: SHIPPED | OUTPATIENT
Start: 2024-02-15

## 2024-02-16 ENCOUNTER — LAB REQUISITION (OUTPATIENT)
Dept: LAB | Facility: HOSPITAL | Age: 74
End: 2024-02-16
Payer: MEDICARE

## 2024-02-16 DIAGNOSIS — K21.9 GASTRO-ESOPHAGEAL REFLUX DISEASE WITHOUT ESOPHAGITIS: ICD-10-CM

## 2024-02-16 DIAGNOSIS — K22.2 ESOPHAGEAL OBSTRUCTION: ICD-10-CM

## 2024-02-16 DIAGNOSIS — K31.7 POLYP OF STOMACH AND DUODENUM: ICD-10-CM

## 2024-02-16 DIAGNOSIS — K44.9 DIAPHRAGMATIC HERNIA WITHOUT OBSTRUCTION OR GANGRENE: ICD-10-CM

## 2024-02-16 DIAGNOSIS — Z86.010 PERSONAL HISTORY OF COLONIC POLYPS: ICD-10-CM

## 2024-02-16 DIAGNOSIS — K57.30 DIVERTICULOSIS OF LARGE INTESTINE WITHOUT PERFORATION OR ABSCESS WITHOUT BLEEDING: ICD-10-CM

## 2024-02-16 DIAGNOSIS — D12.2 BENIGN NEOPLASM OF ASCENDING COLON: ICD-10-CM

## 2024-02-16 DIAGNOSIS — Z83.719 FAMILY HISTORY OF COLON POLYPS, UNSPECIFIED: ICD-10-CM

## 2024-02-16 DIAGNOSIS — R13.10 DYSPHAGIA, UNSPECIFIED: ICD-10-CM

## 2024-02-16 DIAGNOSIS — R10.13 EPIGASTRIC PAIN: ICD-10-CM

## 2024-02-16 DIAGNOSIS — D12.0 BENIGN NEOPLASM OF CECUM: ICD-10-CM

## 2024-02-19 LAB — REF LAB TEST METHOD: NORMAL

## 2024-02-27 ENCOUNTER — OFFICE VISIT (OUTPATIENT)
Dept: FAMILY MEDICINE CLINIC | Facility: CLINIC | Age: 74
End: 2024-02-27
Payer: MEDICARE

## 2024-02-27 VITALS
DIASTOLIC BLOOD PRESSURE: 74 MMHG | BODY MASS INDEX: 24.43 KG/M2 | HEIGHT: 68 IN | HEART RATE: 84 BPM | WEIGHT: 161.2 LBS | OXYGEN SATURATION: 98 % | SYSTOLIC BLOOD PRESSURE: 126 MMHG

## 2024-02-27 DIAGNOSIS — F51.01 PRIMARY INSOMNIA: Primary | ICD-10-CM

## 2024-02-27 DIAGNOSIS — F41.9 ANXIETY: ICD-10-CM

## 2024-02-27 DIAGNOSIS — I10 PRIMARY HYPERTENSION: ICD-10-CM

## 2024-02-27 RX ORDER — ZOLPIDEM TARTRATE 5 MG/1
5-10 TABLET ORAL NIGHTLY PRN
Qty: 50 TABLET | Refills: 2 | Status: SHIPPED | OUTPATIENT
Start: 2024-02-27

## 2024-02-27 RX ORDER — ALPRAZOLAM 0.25 MG/1
0.12 TABLET ORAL DAILY PRN
Qty: 15 TABLET | Refills: 0 | Status: SHIPPED | OUTPATIENT
Start: 2024-02-27

## 2024-02-27 NOTE — PROGRESS NOTES
Subjective   Dixie Cervantes is a 73 y.o. female.   Chief Complaint   Patient presents with    Insomnia    Heartburn       Insomnia  Associated symptoms include arthralgias. Pertinent negatives include no chest pain, chills, fever or headaches.   Heartburn  She reports no chest pain.      Here for follow up after colonoscopy and endoscopy and for insomnia.  Has been on Ambien for several years, denies unusual behaviors.  Has stopped losartan on her own, monitoring BP at home, staying in 120's    The following portions of the patient's history were reviewed and updated as appropriate: allergies, current medications, past family history, past medical history, past social history, past surgical history, and problem list.    Review of Systems   Constitutional:  Negative for chills and fever.   Eyes:  Negative for visual disturbance.   Respiratory:  Negative for shortness of breath.    Cardiovascular:  Negative for chest pain and palpitations.   Gastrointestinal:  Positive for constipation (occ) and diarrhea (occ).        Epigastric    Musculoskeletal:  Positive for arthralgias.   Neurological:  Negative for dizziness, light-headedness and headaches.   Psychiatric/Behavioral:  Positive for sleep disturbance. Negative for self-injury and suicidal ideas. The patient is nervous/anxious and has insomnia.        Objective   Physical Exam  Vitals reviewed.   Constitutional:       General: She is not in acute distress.     Appearance: Normal appearance. She is not ill-appearing, toxic-appearing or diaphoretic.   HENT:      Head: Normocephalic and atraumatic.   Cardiovascular:      Rate and Rhythm: Regular rhythm.      Heart sounds: Normal heart sounds.   Pulmonary:      Effort: Pulmonary effort is normal. No respiratory distress.      Breath sounds: Normal breath sounds.   Neurological:      Mental Status: She is alert and oriented to person, place, and time.   Psychiatric:         Thought Content: Thought content normal.          "Judgment: Judgment normal.       /74 (BP Location: Right arm, Patient Position: Sitting, Cuff Size: Adult)   Pulse 84   Ht 172.7 cm (67.99\")   Wt 73.1 kg (161 lb 3.2 oz)   SpO2 98%   BMI 24.52 kg/m²     Assessment & Plan   Problems Addressed this Visit       Primary hypertension    Insomnia - Primary    Relevant Medications    zolpidem (AMBIEN) 5 MG tablet    Anxiety    Relevant Medications    ALPRAZolam (XANAX) 0.25 MG tablet     Diagnoses         Codes Comments    Primary insomnia    -  Primary ICD-10-CM: F51.01  ICD-9-CM: 307.42     Primary hypertension     ICD-10-CM: I10  ICD-9-CM: 401.9     Anxiety     ICD-10-CM: F41.9  ICD-9-CM: 300.00             Continue to monitor BP, goal 130/80 , restart losartan at 1/2 dose if BP running higher than goal consistently  This patient is on a controlled substance which improves symptoms/quality of life and is aware of the risks, benefits and possible side-effects current treatment. The patient denies any medication side-effects at this time. A controlled substance agreement will be obtained or is currently on file. We reviewed required monitoring for controlled substances including but not limited to quarterly follow-up visits, annual depression screening, and urine drug screens to which the patient is agreeable. A MADY report has been or shortly will be reviewed. There are no signs of deviation or misuse.   Refilled ambien and xanax  Patient was encouraged to keep me informed of any acute changes, lack of improvement, or any new concerning symptoms.         "

## 2024-06-10 ENCOUNTER — OFFICE VISIT (OUTPATIENT)
Dept: FAMILY MEDICINE CLINIC | Facility: CLINIC | Age: 74
End: 2024-06-10
Payer: MEDICARE

## 2024-06-10 VITALS
WEIGHT: 164.4 LBS | SYSTOLIC BLOOD PRESSURE: 132 MMHG | HEIGHT: 68 IN | HEART RATE: 86 BPM | BODY MASS INDEX: 24.92 KG/M2 | DIASTOLIC BLOOD PRESSURE: 82 MMHG | OXYGEN SATURATION: 97 %

## 2024-06-10 DIAGNOSIS — F51.01 PRIMARY INSOMNIA: ICD-10-CM

## 2024-06-10 DIAGNOSIS — E78.2 MIXED HYPERLIPIDEMIA: ICD-10-CM

## 2024-06-10 DIAGNOSIS — R20.0 NUMBNESS OF FINGER: ICD-10-CM

## 2024-06-10 DIAGNOSIS — M79.602 CHRONIC PAIN OF LEFT UPPER EXTREMITY: ICD-10-CM

## 2024-06-10 DIAGNOSIS — E78.5 DYSLIPIDEMIA: ICD-10-CM

## 2024-06-10 DIAGNOSIS — G89.29 CHRONIC PAIN OF LEFT UPPER EXTREMITY: ICD-10-CM

## 2024-06-10 DIAGNOSIS — F41.9 ANXIETY: ICD-10-CM

## 2024-06-10 DIAGNOSIS — M25.522 LEFT ELBOW PAIN: ICD-10-CM

## 2024-06-10 DIAGNOSIS — I10 PRIMARY HYPERTENSION: Primary | ICD-10-CM

## 2024-06-10 PROCEDURE — 99214 OFFICE O/P EST MOD 30 MIN: CPT | Performed by: NURSE PRACTITIONER

## 2024-06-10 PROCEDURE — 3075F SYST BP GE 130 - 139MM HG: CPT | Performed by: NURSE PRACTITIONER

## 2024-06-10 PROCEDURE — 3079F DIAST BP 80-89 MM HG: CPT | Performed by: NURSE PRACTITIONER

## 2024-06-10 PROCEDURE — 1125F AMNT PAIN NOTED PAIN PRSNT: CPT | Performed by: NURSE PRACTITIONER

## 2024-06-10 RX ORDER — ALPRAZOLAM 0.25 MG/1
0.12 TABLET ORAL DAILY PRN
Qty: 15 TABLET | Refills: 2 | Status: SHIPPED | OUTPATIENT
Start: 2024-06-10

## 2024-06-10 RX ORDER — LOSARTAN POTASSIUM 50 MG/1
50 TABLET ORAL DAILY
Qty: 90 TABLET | Refills: 1 | Status: SHIPPED | OUTPATIENT
Start: 2024-06-10

## 2024-06-10 RX ORDER — ZOLPIDEM TARTRATE 5 MG/1
5-10 TABLET ORAL NIGHTLY PRN
Qty: 50 TABLET | Refills: 2 | Status: SHIPPED | OUTPATIENT
Start: 2024-06-10

## 2024-06-10 RX ORDER — ROSUVASTATIN CALCIUM 10 MG/1
10 TABLET, COATED ORAL NIGHTLY
Qty: 90 TABLET | Refills: 1 | Status: SHIPPED | OUTPATIENT
Start: 2024-06-10

## 2024-06-10 NOTE — PROGRESS NOTES
"Subjective   Dixie Cervantes is a 73 y.o. female.   Chief Complaint   Patient presents with    Hypertension    Insomnia    Anxiety    Elbow Pain       Hypertension  Associated symptoms include anxiety and headaches (occ). Pertinent negatives include no chest pain, palpitations or shortness of breath.   Insomnia  Associated symptoms include arthralgias, coughing (acid reflux), fatigue, headaches (occ) and numbness (left 5th finger). Pertinent negatives include no abdominal pain, chest pain, chills, diaphoresis or fever.   Anxiety   Symptoms include insomnia and nervous/anxious behavior.  Patient reports no chest pain, dizziness, palpitations or shortness of breath.   Elbow Pain  Associated symptoms include arthralgias, coughing (acid reflux), fatigue, headaches (occ) and numbness (left 5th finger). Pertinent negatives include no abdominal pain, chest pain, chills, diaphoresis or fever.      Has been taking losartan 50 mg since BP was elevated some at home; highest up to 175 SBP; previously on 25 mg losartan; had some stressful situations a home, BP was running higher, anxiety was worse so also took more alprazolam. States taking daily alprazolam makes her more irritable and she snaps at her family more.    Has had some left elbow pain and numbness in pinky, occ fingers in left hand tighten up, \"lock up. She had a nerve conduction for this several years ago, was told symptoms were due to a nerve going through her elbow. Symptoms recently getting worse.   Patient has been on Ambien for insomnia for many years, takes this every night, denies unusual behaviors associated with taking this.      The following portions of the patient's history were reviewed and updated as appropriate: allergies, current medications, past family history, past medical history, past social history, past surgical history, and problem list.    Review of Systems   Constitutional:  Positive for fatigue. Negative for chills, diaphoresis, fever and " "unexpected weight change.   Eyes:  Positive for visual disturbance. Photophobia: ocular migraines; cataracts.  Respiratory:  Positive for cough (acid reflux). Negative for shortness of breath and wheezing.    Cardiovascular:  Negative for chest pain, palpitations and leg swelling.   Gastrointestinal:  Negative for abdominal pain, constipation and diarrhea.   Musculoskeletal:  Positive for arthralgias.   Neurological:  Positive for numbness (left 5th finger) and headaches (occ). Negative for dizziness.   Psychiatric/Behavioral:  Positive for sleep disturbance. The patient is nervous/anxious and has insomnia.        Objective   Physical Exam  Vitals reviewed.   Constitutional:       General: She is not in acute distress.     Appearance: Normal appearance. She is not ill-appearing, toxic-appearing or diaphoretic.   HENT:      Head: Normocephalic and atraumatic.      Right Ear: Tympanic membrane normal.      Left Ear: Tympanic membrane normal.   Neck:      Vascular: No carotid bruit.   Cardiovascular:      Rate and Rhythm: Normal rate and regular rhythm.      Heart sounds: Normal heart sounds. No murmur heard.  Pulmonary:      Effort: Pulmonary effort is normal.      Breath sounds: Normal breath sounds.   Neurological:      Mental Status: She is alert and oriented to person, place, and time.   Psychiatric:         Mood and Affect: Mood is anxious.         Thought Content: Thought content normal.         Judgment: Judgment normal.       /82   Pulse 86   Ht 172.7 cm (67.99\")   Wt 74.6 kg (164 lb 6.4 oz)   SpO2 97%   BMI 25.00 kg/m²     Assessment & Plan   Problems Addressed this Visit       Primary hypertension - Primary    Relevant Medications    losartan (Cozaar) 50 MG tablet    Mixed hyperlipidemia    Relevant Medications    rosuvastatin (Crestor) 10 MG tablet    Insomnia    Relevant Medications    zolpidem (AMBIEN) 5 MG tablet    Dyslipidemia    Anxiety    Relevant Medications    ALPRAZolam (XANAX) 0.25 MG " tablet     Other Visit Diagnoses       Chronic pain of left upper extremity        Relevant Orders    EMG & Nerve Conduction Test    Numbness of finger        Relevant Orders    EMG & Nerve Conduction Test    Left elbow pain        Relevant Orders    EMG & Nerve Conduction Test          Diagnoses         Codes Comments    Primary hypertension    -  Primary ICD-10-CM: I10  ICD-9-CM: 401.9     Dyslipidemia     ICD-10-CM: E78.5  ICD-9-CM: 272.4     Primary insomnia     ICD-10-CM: F51.01  ICD-9-CM: 307.42     Chronic pain of left upper extremity     ICD-10-CM: M79.602, G89.29  ICD-9-CM: 729.5, 338.29     Numbness of finger     ICD-10-CM: R20.0  ICD-9-CM: 782.0     Left elbow pain     ICD-10-CM: M25.522  ICD-9-CM: 719.42     Anxiety     ICD-10-CM: F41.9  ICD-9-CM: 300.00     Mixed hyperlipidemia     ICD-10-CM: E78.2  ICD-9-CM: 272.2           This patient is on a controlled substance which improves symptoms/quality of life and is aware of the risks, benefits and possible side-effects current treatment. The patient denies any medication side-effects at this time. A controlled substance agreement will be obtained or is currently on file. We reviewed required monitoring for controlled substances including but not limited to quarterly follow-up visits, annual depression screening, and urine drug screens to which the patient is agreeable. A MADY report has been or shortly will be reviewed. There are no signs of deviation or misuse.   HTN is controlled with 50 mg losartan, just recently increased dose.  Continue rosuvastatin for HLD, is due labs in August, will schedule appointment with new provider.

## 2024-06-17 ENCOUNTER — TRANSCRIBE ORDERS (OUTPATIENT)
Dept: ADMINISTRATIVE | Facility: HOSPITAL | Age: 74
End: 2024-06-17
Payer: MEDICARE

## 2024-06-17 DIAGNOSIS — Z12.31 OTHER SCREENING MAMMOGRAM: Primary | ICD-10-CM

## 2024-07-25 ENCOUNTER — OFFICE VISIT (OUTPATIENT)
Dept: FAMILY MEDICINE CLINIC | Facility: CLINIC | Age: 74
End: 2024-07-25
Payer: MEDICARE

## 2024-07-25 VITALS
SYSTOLIC BLOOD PRESSURE: 128 MMHG | OXYGEN SATURATION: 96 % | BODY MASS INDEX: 24.71 KG/M2 | DIASTOLIC BLOOD PRESSURE: 72 MMHG | HEIGHT: 68 IN | HEART RATE: 86 BPM | WEIGHT: 163 LBS

## 2024-07-25 DIAGNOSIS — I10 PRIMARY HYPERTENSION: ICD-10-CM

## 2024-07-25 DIAGNOSIS — F41.9 ANXIETY: ICD-10-CM

## 2024-07-25 DIAGNOSIS — E78.2 MIXED HYPERLIPIDEMIA: Primary | ICD-10-CM

## 2024-07-25 DIAGNOSIS — R05.3 CHRONIC COUGH: ICD-10-CM

## 2024-07-25 DIAGNOSIS — F51.01 PRIMARY INSOMNIA: ICD-10-CM

## 2024-07-25 PROCEDURE — 3074F SYST BP LT 130 MM HG: CPT | Performed by: INTERNAL MEDICINE

## 2024-07-25 PROCEDURE — 3078F DIAST BP <80 MM HG: CPT | Performed by: INTERNAL MEDICINE

## 2024-07-25 PROCEDURE — 1125F AMNT PAIN NOTED PAIN PRSNT: CPT | Performed by: INTERNAL MEDICINE

## 2024-07-25 PROCEDURE — 99213 OFFICE O/P EST LOW 20 MIN: CPT | Performed by: INTERNAL MEDICINE

## 2024-07-25 RX ORDER — ZOLPIDEM TARTRATE 5 MG/1
5-10 TABLET ORAL NIGHTLY PRN
Qty: 50 TABLET | Refills: 2 | Status: CANCELLED | OUTPATIENT
Start: 2024-07-25

## 2024-07-25 NOTE — PROGRESS NOTES
Dixie Cervantes  1950  [id number]  Patient Care Team:  Aram Justice MD as PCP - General (Internal Medicine)  Camden Franco MD (Neurosurgery)  Jason Pino MD as Consulting Physician (Pulmonary Disease)  Fady Lane MD as Consulting Physician (Otolaryngology)  Clive Chin (Endocrinology)    Dixie Cervantes is a 73 y.o. female here today for follow up.     This patient is accompanied by their self who contributes to the history of their care.    Chief Complaint:    Chief Complaint   Patient presents with    Hypertension        History of Present Illness:  I have reviewed and/or updated the patient's past medical, past surgical, family, social history, problem list and allergies as appropriate.     This lady is transferring within our office.  She has a history of hypertension, currently maintained on Cozaar 50 mg daily.  Not routinely check bp at home. Her dyslipidemia has been controlled with rosuvastatin 10 mg daily.    She also has GERD and esophageal spasm.  She is maintained on Protonix 40 mg daily.  Has periodic reflux.  Metabet not elevated she has apparent underlying generalized anxiety disorder requiring Ambien for sleep and as needed Xanax- seldom needs ( issues around her daughter.  Recently underwent colonoscopy and EGD.  She did have some evidence inflammation (stretched) esophagus as well as recovery of tubular adenomas. Has recall for adenomas.   She swims 4 times per week and cor exercises. Contemplating joining a Health club . Has access recumbent bike and treadmill.   She has been on prolia since her leg fracture- was previously on fosamax. This is managed thru endocrinology.       Review of Systems   Respiratory:  Positive for cough.    Gastrointestinal:  Positive for GERD.   Psychiatric/Behavioral:  Positive for sleep disturbance.        Vitals:    07/25/24 0919   BP: 128/72   Pulse: 86   SpO2: 96%   Weight: 73.9 kg (163 lb)   Height: 172.7 cm  "(67.99\")     Body mass index is 24.79 kg/m².    Physical Exam  Constitutional:       Appearance: Normal appearance.   Cardiovascular:      Rate and Rhythm: Normal rate.      Pulses: Normal pulses.   Pulmonary:      Effort: Pulmonary effort is normal. No respiratory distress.   Musculoskeletal:      Comments: Walkng boot RLE   Psychiatric:         Mood and Affect: Mood normal.         Behavior: Behavior normal.         Procedures    Results Review:    None    Assessment/Plan:    Problem List Items Addressed This Visit       Insomnia    Relevant Medications    zolpidem (AMBIEN) 5 MG tablet    Chronic cough    Overview     thought to be cough variant asthma         Anxiety    Relevant Medications    ALPRAZolam (XANAX) 0.25 MG tablet    Primary hypertension    Relevant Medications    losartan (Cozaar) 50 MG tablet    Mixed hyperlipidemia - Primary    Relevant Medications    rosuvastatin (Crestor) 10 MG tablet   Under with her chronic cough is tracheal esophageal reflux.  I have recommended elevation of the head of the bed, continue Protonix.    Plan of care reviewed with patient at the conclusion of today's visit. Education was provided regarding diagnosis and management.  Patient verbalizes understanding of and agreement with management plan.    Return in about 5 weeks (around 8/29/2024) for Medicare Wellness.    Aram Justice MD      Please note than portions of this note were completed wth a Voice Recognition Program          "

## 2024-07-30 ENCOUNTER — TELEPHONE (OUTPATIENT)
Dept: FAMILY MEDICINE CLINIC | Facility: CLINIC | Age: 74
End: 2024-07-30

## 2024-07-30 ENCOUNTER — HOSPITAL ENCOUNTER (OUTPATIENT)
Dept: MAMMOGRAPHY | Facility: HOSPITAL | Age: 74
Discharge: HOME | End: 2024-07-30
Admitting: NURSE PRACTITIONER
Payer: MEDICARE

## 2024-07-30 DIAGNOSIS — Z12.31 OTHER SCREENING MAMMOGRAM: ICD-10-CM

## 2024-07-30 PROCEDURE — 77067 SCR MAMMO BI INCL CAD: CPT

## 2024-07-30 PROCEDURE — 77063 BREAST TOMOSYNTHESIS BI: CPT

## 2024-07-30 NOTE — TELEPHONE ENCOUNTER
Caller: Dixie Cervantes    Relationship: Self    Best call back number: [phone]     Who is your current provider: DARLIN DAVALOS    Is your current provider offboarding? NO    Who would you like your new provider to be: DR. ANA VIEIRA    What are your reasons for transferring care: SHE WANTS AN MD

## 2024-08-15 ENCOUNTER — TELEPHONE (OUTPATIENT)
Dept: FAMILY MEDICINE CLINIC | Facility: CLINIC | Age: 74
End: 2024-08-15
Payer: MEDICARE

## 2024-08-15 DIAGNOSIS — E78.5 DYSLIPIDEMIA: ICD-10-CM

## 2024-08-15 DIAGNOSIS — F51.01 PRIMARY INSOMNIA: ICD-10-CM

## 2024-08-15 DIAGNOSIS — M81.0 AGE RELATED OSTEOPOROSIS, UNSPECIFIED PATHOLOGICAL FRACTURE PRESENCE: ICD-10-CM

## 2024-08-15 DIAGNOSIS — I10 PRIMARY HYPERTENSION: ICD-10-CM

## 2024-08-15 DIAGNOSIS — E78.2 MIXED HYPERLIPIDEMIA: Primary | ICD-10-CM

## 2024-08-15 DIAGNOSIS — K21.9 GASTROESOPHAGEAL REFLUX DISEASE, UNSPECIFIED WHETHER ESOPHAGITIS PRESENT: ICD-10-CM

## 2024-08-15 DIAGNOSIS — R73.03 PREDIABETES: ICD-10-CM

## 2024-08-15 NOTE — TELEPHONE ENCOUNTER
.    Caller: Dixie Cervantes    Relationship: Self    Best call back number: [phone]     What orders are you requesting (i.e. lab or imaging): LABS FOR APPOINTMENT ON AUGUST 26    In what timeframe would the patient need to come in: NEXT WEEK    Where will you receive your lab/imaging services: OFFICE    Additional notes:

## 2024-08-19 ENCOUNTER — LAB (OUTPATIENT)
Dept: LAB | Facility: HOSPITAL | Age: 74
End: 2024-08-19
Payer: MEDICARE

## 2024-08-19 DIAGNOSIS — E78.5 DYSLIPIDEMIA: ICD-10-CM

## 2024-08-19 DIAGNOSIS — M81.0 AGE RELATED OSTEOPOROSIS, UNSPECIFIED PATHOLOGICAL FRACTURE PRESENCE: ICD-10-CM

## 2024-08-19 DIAGNOSIS — R73.03 PREDIABETES: ICD-10-CM

## 2024-08-19 DIAGNOSIS — F51.01 PRIMARY INSOMNIA: ICD-10-CM

## 2024-08-19 DIAGNOSIS — I10 PRIMARY HYPERTENSION: ICD-10-CM

## 2024-08-19 DIAGNOSIS — K21.9 GASTROESOPHAGEAL REFLUX DISEASE, UNSPECIFIED WHETHER ESOPHAGITIS PRESENT: ICD-10-CM

## 2024-08-19 DIAGNOSIS — E78.2 MIXED HYPERLIPIDEMIA: ICD-10-CM

## 2024-08-19 LAB
25(OH)D3 SERPL-MCNC: 46.4 NG/ML (ref 30–100)
ALBUMIN SERPL-MCNC: 4.5 G/DL (ref 3.5–5.2)
ALBUMIN/GLOB SERPL: 1.9 G/DL
ALP SERPL-CCNC: 55 U/L (ref 39–117)
ALT SERPL W P-5'-P-CCNC: 20 U/L (ref 1–33)
ANION GAP SERPL CALCULATED.3IONS-SCNC: 11.2 MMOL/L (ref 5–15)
AST SERPL-CCNC: 23 U/L (ref 1–32)
BILIRUB SERPL-MCNC: 0.9 MG/DL (ref 0–1.2)
BUN SERPL-MCNC: 14 MG/DL (ref 8–23)
BUN/CREAT SERPL: 14 (ref 7–25)
CALCIUM SPEC-SCNC: 10.4 MG/DL (ref 8.6–10.5)
CHLORIDE SERPL-SCNC: 103 MMOL/L (ref 98–107)
CHOLEST SERPL-MCNC: 131 MG/DL (ref 0–200)
CO2 SERPL-SCNC: 26.8 MMOL/L (ref 22–29)
CREAT SERPL-MCNC: 1 MG/DL (ref 0.57–1)
DEPRECATED RDW RBC AUTO: 39.7 FL (ref 37–54)
EGFRCR SERPLBLD CKD-EPI 2021: 59.2 ML/MIN/1.73
ERYTHROCYTE [DISTWIDTH] IN BLOOD BY AUTOMATED COUNT: 12.3 % (ref 12.3–15.4)
GLOBULIN UR ELPH-MCNC: 2.4 GM/DL
GLUCOSE SERPL-MCNC: 117 MG/DL (ref 65–99)
HBA1C MFR BLD: 6.3 % (ref 4.8–5.6)
HCT VFR BLD AUTO: 42 % (ref 34–46.6)
HDLC SERPL-MCNC: 47 MG/DL (ref 40–60)
HGB BLD-MCNC: 14.4 G/DL (ref 12–15.9)
LDLC SERPL CALC-MCNC: 59 MG/DL (ref 0–100)
LDLC/HDLC SERPL: 1.16 {RATIO}
MCH RBC QN AUTO: 30.4 PG (ref 26.6–33)
MCHC RBC AUTO-ENTMCNC: 34.3 G/DL (ref 31.5–35.7)
MCV RBC AUTO: 88.6 FL (ref 79–97)
PLATELET # BLD AUTO: 226 10*3/MM3 (ref 140–450)
PMV BLD AUTO: 10.5 FL (ref 6–12)
POTASSIUM SERPL-SCNC: 5.1 MMOL/L (ref 3.5–5.2)
PROT SERPL-MCNC: 6.9 G/DL (ref 6–8.5)
RBC # BLD AUTO: 4.74 10*6/MM3 (ref 3.77–5.28)
SODIUM SERPL-SCNC: 141 MMOL/L (ref 136–145)
TRIGL SERPL-MCNC: 147 MG/DL (ref 0–150)
TSH SERPL DL<=0.05 MIU/L-ACNC: 3.01 UIU/ML (ref 0.27–4.2)
VLDLC SERPL-MCNC: 25 MG/DL (ref 5–40)
WBC NRBC COR # BLD AUTO: 9.45 10*3/MM3 (ref 3.4–10.8)

## 2024-08-19 PROCEDURE — 80053 COMPREHEN METABOLIC PANEL: CPT

## 2024-08-19 PROCEDURE — 80061 LIPID PANEL: CPT

## 2024-08-19 PROCEDURE — 83036 HEMOGLOBIN GLYCOSYLATED A1C: CPT

## 2024-08-19 PROCEDURE — 82306 VITAMIN D 25 HYDROXY: CPT

## 2024-08-19 PROCEDURE — 84443 ASSAY THYROID STIM HORMONE: CPT

## 2024-08-19 PROCEDURE — 85027 COMPLETE CBC AUTOMATED: CPT

## 2024-08-26 ENCOUNTER — OFFICE VISIT (OUTPATIENT)
Dept: FAMILY MEDICINE CLINIC | Facility: CLINIC | Age: 74
End: 2024-08-26
Payer: MEDICARE

## 2024-08-26 VITALS
OXYGEN SATURATION: 97 % | SYSTOLIC BLOOD PRESSURE: 130 MMHG | HEIGHT: 68 IN | DIASTOLIC BLOOD PRESSURE: 82 MMHG | HEART RATE: 82 BPM | BODY MASS INDEX: 24.86 KG/M2 | WEIGHT: 164 LBS

## 2024-08-26 DIAGNOSIS — Z13.820 ENCOUNTER FOR SCREENING FOR OSTEOPOROSIS: ICD-10-CM

## 2024-08-26 DIAGNOSIS — F51.01 PRIMARY INSOMNIA: ICD-10-CM

## 2024-08-26 DIAGNOSIS — E78.5 DYSLIPIDEMIA: ICD-10-CM

## 2024-08-26 DIAGNOSIS — I10 PRIMARY HYPERTENSION: ICD-10-CM

## 2024-08-26 DIAGNOSIS — Z23 IMMUNIZATION DUE: ICD-10-CM

## 2024-08-26 DIAGNOSIS — Z78.0 MENOPAUSE: ICD-10-CM

## 2024-08-26 DIAGNOSIS — Z00.00 MEDICARE ANNUAL WELLNESS VISIT, SUBSEQUENT: Primary | ICD-10-CM

## 2024-08-26 DIAGNOSIS — E78.2 MIXED HYPERLIPIDEMIA: ICD-10-CM

## 2024-08-26 PROCEDURE — 3075F SYST BP GE 130 - 139MM HG: CPT | Performed by: STUDENT IN AN ORGANIZED HEALTH CARE EDUCATION/TRAINING PROGRAM

## 2024-08-26 PROCEDURE — 3079F DIAST BP 80-89 MM HG: CPT | Performed by: STUDENT IN AN ORGANIZED HEALTH CARE EDUCATION/TRAINING PROGRAM

## 2024-08-26 PROCEDURE — 1160F RVW MEDS BY RX/DR IN RCRD: CPT | Performed by: STUDENT IN AN ORGANIZED HEALTH CARE EDUCATION/TRAINING PROGRAM

## 2024-08-26 PROCEDURE — 1125F AMNT PAIN NOTED PAIN PRSNT: CPT | Performed by: STUDENT IN AN ORGANIZED HEALTH CARE EDUCATION/TRAINING PROGRAM

## 2024-08-26 PROCEDURE — 1170F FXNL STATUS ASSESSED: CPT | Performed by: STUDENT IN AN ORGANIZED HEALTH CARE EDUCATION/TRAINING PROGRAM

## 2024-08-26 PROCEDURE — G0439 PPPS, SUBSEQ VISIT: HCPCS | Performed by: STUDENT IN AN ORGANIZED HEALTH CARE EDUCATION/TRAINING PROGRAM

## 2024-08-26 PROCEDURE — 1159F MED LIST DOCD IN RCRD: CPT | Performed by: STUDENT IN AN ORGANIZED HEALTH CARE EDUCATION/TRAINING PROGRAM

## 2024-08-26 RX ORDER — ZOLPIDEM TARTRATE 5 MG/1
5-10 TABLET ORAL NIGHTLY PRN
Qty: 50 TABLET | Refills: 2 | Status: SHIPPED | OUTPATIENT
Start: 2024-08-26

## 2024-08-26 NOTE — PROGRESS NOTES
Subjective   The ABCs of the Annual Wellness Visit  Medicare Wellness Visit      Dixie Cervantes is a 73 y.o. patient who presents for a Medicare Wellness Visit.    The following portions of the patient's history were reviewed and   updated as appropriate: allergies, current medications, past family history, past medical history, past social history, past surgical history, and problem list.    Compared to one year ago, the patient's physical   health is better.  Compared to one year ago, the patient's mental   health is worse.    She lives active lifestyle. Enjoys swimming, planning to join health club. Enjoys recumbent biking.       Recent Hospitalizations:  She was not admitted to the hospital during the last year.     Current Medical Providers:  Patient Care Team:  Sarina Parada PA-C as PCP - General (Physician Assistant)  Camden Franco MD (Neurosurgery)  Jason Pino MD as Consulting Physician (Pulmonary Disease)  Fady Lane MD as Consulting Physician (Otolaryngology)  Clive Chin (Endocrinology)  Dr. Beltran, Ortho  Dr. Mcdaniels, Ophthalmology    Outpatient Medications Prior to Visit   Medication Sig Dispense Refill    ALPRAZolam (XANAX) 0.25 MG tablet Take 0.5 tablets by mouth Daily As Needed for Anxiety. 15 tablet 2    denosumab (Prolia) 60 MG/ML solution prefilled syringe syringe Prolia 60 mg/mL subcutaneous syringe   60 mg subcutaneous every 6 months      diclofenac (VOLTAREN) 1 % gel gel As Needed.      glucosamine-chondroitin 500-400 MG capsule capsule Take 1 capsule by mouth 3 (Three) Times a Day With Meals.      lidocaine (LIDODERM) 5 % Place 1 patch on the skin as directed by provider Daily. Remove & Discard patch within 12 hours or as directed by MD 90 patch 1    losartan (Cozaar) 50 MG tablet Take 1 tablet by mouth Daily. 90 tablet 1    Multiple Vitamins-Minerals (MULTI VITAMIN/MINERALS) tablet Take 1 tablet by mouth.      nystatin (MYCOSTATIN)  "235156 UNIT/GM cream Apply  topically to the appropriate area as directed As Needed (yeast rash). 30 g 2    pantoprazole (PROTONIX) 40 MG EC tablet Take 1 tablet by mouth 2 (Two) Times a Day. Take 1 tablet by mouth 2 (Two) Times a Day half hour before meals 60 tablet 11    rosuvastatin (Crestor) 10 MG tablet Take 1 tablet by mouth Every Night. 90 tablet 1    zolpidem (AMBIEN) 5 MG tablet Take 1-2 tablets by mouth At Night As Needed for Sleep. 50 tablet 2     No facility-administered medications prior to visit.     No opioid medication identified on active medication list. I have reviewed chart for other potential  high risk medication/s and harmful drug interactions in the elderly.      Aspirin is not on active medication list.  Aspirin use is not indicated based on review of current medical condition/s. Risk of harm outweighs potential benefits.  .    Patient Active Problem List   Diagnosis    Vestibular vertigo    Urinary, incontinence, stress female    Parasitic infection    Irritable bowel syndrome (IBS)    Inverted nipple    Insomnia    History of bladder infections    GERD (gastroesophageal reflux disease)    OCTAVIO (generalized anxiety disorder)    External otitis    Esophageal spasm    Diverticulosis    Chronic low back pain    Chronic cough    Age related osteoporosis    Closed nondisplaced fracture of lateral malleolus of right fibula    Anxiety    Primary hypertension    Dyslipidemia    Prediabetes    Mixed hyperlipidemia     Advance Care Planning Advance Directive is on file.  ACP discussion was held with the patient during this visit. Patient has an advance directive in EMR which is still valid.             Objective   Vitals:    08/26/24 1014   BP: 130/82   Pulse: 82   SpO2: 97%   Weight: 74.4 kg (164 lb)   Height: 172.7 cm (67.99\")       Estimated body mass index is 24.94 kg/m² as calculated from the following:    Height as of this encounter: 172.7 cm (67.99\").    Weight as of this encounter: 74.4 kg " (164 lb).    BMI is within normal parameters. No other follow-up for BMI required.       Does the patient have evidence of cognitive impairment? No  Lab Results   Component Value Date    TRIG 147 2024    HDL 47 2024    LDL 59 2024    VLDL 25 2024    HGBA1C 6.30 (H) 2024                                                                                               Health  Risk Assessment    Smoking Status:  Social History     Tobacco Use   Smoking Status Never    Passive exposure: Never   Smokeless Tobacco Never     Alcohol Consumption:  Social History     Substance and Sexual Activity   Alcohol Use Yes    Comment: 2-3 drinks a month       Fall Risk Screen  STEADI Fall Risk Assessment was completed, and patient is at LOW risk for falls.Assessment completed on:2024    Depression Screenin/26/2024    10:25 AM   PHQ-2/PHQ-9 Depression Screening   Little Interest or Pleasure in Doing Things 0-->not at all   Feeling Down, Depressed or Hopeless 1-->several days   PHQ-9: Brief Depression Severity Measure Score 1     Health Habits and Functional and Cognitive Screenin/26/2024    10:25 AM   Functional & Cognitive Status   Do you have difficulty preparing food and eating? No   Do you have difficulty bathing yourself, getting dressed or grooming yourself? No   Do you have difficulty using the toilet? No   Do you have difficulty moving around from place to place? No   Do you have trouble with steps or getting out of a bed or a chair? No   Current Diet Well Balanced Diet   Dental Exam Up to date   Eye Exam Up to date   Exercise (times per week) 2 times per week   Current Exercises Include Walking   Do you need help using the phone?  No   Are you deaf or do you have serious difficulty hearing?  Yes   Do you need help to go to places out of walking distance? No   Do you need help shopping? No   Do you need help preparing meals?  No   Do you need help with housework?  No   Do you  need help with laundry? No   Do you need help taking your medications? No   Do you need help managing money? No   Do you ever drive or ride in a car without wearing a seat belt? No   Have you felt unusual stress, anger or loneliness in the last month? Yes   Who do you live with? Spouse   If you need help, do you have trouble finding someone available to you? No   Have you been bothered in the last four weeks by sexual problems? No   Do you have difficulty concentrating, remembering or making decisions? Yes           Age-appropriate Screening Schedule:  Refer to the list below for future screening recommendations based on patient's age, sex and/or medical conditions. Orders for these recommended tests are listed in the plan section. The patient has been provided with a written plan.    Health Maintenance List  Health Maintenance   Topic Date Due    INFLUENZA VACCINE  08/01/2024    TDAP/TD VACCINES (2 - Td or Tdap) 12/27/2024 (Originally 1/1/2024)    ZOSTER VACCINE (2 of 3) 12/27/2024 (Originally 2/26/2014)    DXA SCAN  03/15/2025    MAMMOGRAM  07/30/2025    LIPID PANEL  08/19/2025    ANNUAL WELLNESS VISIT  08/26/2025    COLORECTAL CANCER SCREENING  02/15/2029    HEPATITIS C SCREENING  Completed    COVID-19 Vaccine  Completed    Pneumococcal Vaccine 65+  Completed                                                                                                                                                CMS Preventative Services Quick Reference  Risk Factors Identified During Encounter  Immunizations Discussed/Encouraged: Influenza  Dental Screening Recommended  Vision Screening Recommended    The above risks/problems have been discussed with the patient.  Pertinent information has been shared with the patient in the After Visit Summary.  An After Visit Summary and PPPS were made available to the patient.    Follow Up:   Next Medicare Wellness visit to be scheduled in 1 year.     Assessment & Plan  Primary  insomnia    Medicare annual wellness visit, subsequent    Encounter for screening for osteoporosis    Menopause    Immunization due    Mixed hyperlipidemia     Primary hypertension    Dyslipidemia       New Medications Ordered This Visit   Medications    zolpidem (AMBIEN) 5 MG tablet     Sig: Take 1-2 tablets by mouth At Night As Needed for Sleep.     Dispense:  50 tablet     Refill:  2        Discussed importance of age appropriate vaccinations, cancer screening, routine labs. Discussed importance of up to date ACP. Discussed importance of fall precautions. Avoid clutter in the home and loose rugs. Always use handrail when available. Discussed the importance of medication compliance and follow up with medical providers. Discussed importance of healthy diet and active lifestyle as able. Smoking cessation not indicated. Discussed importance of up to date eye exams and dental exams.    Encouraged annual influenza vaccine, covid booster when due, shingrix at the pharmacy    Cancer screening   Colonoscopy 2024- repeat in 3-5 years  Mammo 7/2024 repeat annually     DXA 2023 - osteoporosis - following with endocrinology for prolia (has been on this for about 2.5 years, did not tolerate alendronate)     Insomnia - has been taking ambien 5-10mg qhs prn for several years. Reports rare AM grogginess with this. She avoids taking 2 tablets on days she needs to wake up early. Will need updated CSA And UDS at next visit     HTN - well controlled with losartan 25mg daily (only taking half tablet of losartan)    HLD - well controlled with rosuvastatin 5mg weekly (only taking half tablet of rosuvastatin)    Prediabetes- A1c 6.3%, encouraged dietary modifications    Chronic right ankle pain s/p ankle fracture, now surgically repaired - following with Dr. Beltran, Ortho. Walking in boot.    Follows with ophthalmology  Dr. Mcdaniels    Reports chronic cough which she has been dealing with for years and feels it is worsening.   Reports an  incredible amount of family stress - she has three adopted children (one unfortunately passed away due to suicide. Other two struggle with mental health, homelessness) -  helping raise her grandchild currently.     Follow Up:   Return in about 3 months (around 11/26/2024) for Recheck.

## 2024-08-26 NOTE — PATIENT INSTRUCTIONS
Medicare Wellness  Personal Prevention Plan of Service     Date of Office Visit:    Encounter Provider:  Jannette Tyler DO  Place of Service:  [facility]  Patient Name: Dixie Cervantes  :  1950    As part of the Medicare Wellness portion of your visit today, we are providing you with this personalized preventive plan of services (PPPS). This plan is based upon recommendations of the [place] Preventive Services Task Force (USPSTF) and the Advisory Committee on Immunization Practices (ACIP).    This lists the preventive care services that should be considered, and provides dates of when you are due. Items listed as completed are up-to-date and do not require any further intervention.    Health Maintenance   Topic Date Due   • ANNUAL WELLNESS VISIT  2024   • INFLUENZA VACCINE  2024   • TDAP/TD VACCINES (2 - Td or Tdap) 2024 (Originally 2024)   • ZOSTER VACCINE (2 of 3) 2024 (Originally 2014)   • DXA SCAN  03/15/2025   • MAMMOGRAM  2025   • LIPID PANEL  2025   • COLORECTAL CANCER SCREENING  02/15/2029   • HEPATITIS C SCREENING  Completed   • COVID-19 Vaccine  Completed   • Pneumococcal Vaccine 65+  Completed       No orders of the defined types were placed in this encounter.      No follow-ups on file.

## 2024-09-04 ENCOUNTER — TELEPHONE (OUTPATIENT)
Dept: FAMILY MEDICINE CLINIC | Facility: CLINIC | Age: 74
End: 2024-09-04

## 2024-09-04 NOTE — TELEPHONE ENCOUNTER
Caller: Dixie Cervantes     Relationship: [unfilled]     Best call back number: [phone]    What is your medical concern? PT CALLED TO CHECK TO SEE IF SHE IS UP TO DATE ON HER TETANUS SHOT, STATED THAT SHE GOT STUCK WITH NAIL.    ALSO STATED THAT SHE RECEIVED VOICEMAIL REGARDING APPT ON FRIDAY FOR NERVE CONDUCTOR BUT NUMBER THAT WAS LEFT WAS NOT VALID, PT WILL LIKE NAME OF PLACE AND PHONE NUMBER TO CONFIRM APPT.

## 2024-09-04 NOTE — TELEPHONE ENCOUNTER
Patient informed. She was not concerned with the wound it barely punctured her skin. I did give her a phone number to the hospital to call and follow up with the EMG test.

## 2024-09-04 NOTE — TELEPHONE ENCOUNTER
She can do walk in nurse visit for Tdap unless she wants someone to look at her wound and then she would need an office appointment with provider.  [facility] is also another option.

## 2024-09-06 ENCOUNTER — HOSPITAL ENCOUNTER (OUTPATIENT)
Dept: NEUROLOGY | Facility: HOSPITAL | Age: 74
Discharge: HOME | End: 2024-09-06
Payer: MEDICARE

## 2024-09-06 DIAGNOSIS — M79.602 CHRONIC PAIN OF LEFT UPPER EXTREMITY: ICD-10-CM

## 2024-09-06 DIAGNOSIS — G89.29 CHRONIC PAIN OF LEFT UPPER EXTREMITY: ICD-10-CM

## 2024-09-06 DIAGNOSIS — M25.522 LEFT ELBOW PAIN: ICD-10-CM

## 2024-09-06 DIAGNOSIS — R20.0 NUMBNESS OF FINGER: ICD-10-CM

## 2024-09-06 PROCEDURE — 95886 MUSC TEST DONE W/N TEST COMP: CPT

## 2024-09-06 PROCEDURE — 95909 NRV CNDJ TST 5-6 STUDIES: CPT

## 2024-09-06 PROCEDURE — 95908 NRV CNDJ TST 3-4 STUDIES: CPT

## 2024-09-12 ENCOUNTER — TELEPHONE (OUTPATIENT)
Dept: FAMILY MEDICINE CLINIC | Facility: CLINIC | Age: 74
End: 2024-09-12
Payer: MEDICARE

## 2024-09-12 NOTE — TELEPHONE ENCOUNTER
----- Message from Jannette Tyler sent at 9/12/2024  2:23 PM EDT -----    Please let patient know her nerve conduction study shows ulnar neuropathy which is a nerve that runs down the arm and into the hand. Compression/entrapment of this nerve can cause symptoms such as elbow pain, 4th/5th finger numbness/tingling, and weakness. Will need follow up appointment to discuss next steps of management.     Unable to reach patient.     Please relay

## 2024-09-12 NOTE — TELEPHONE ENCOUNTER
Name: Dixie Cervantes ANA      Relationship: Self      Best Callback Number: [phone]       HUB PROVIDED THE RELAY MESSAGE FROM THE OFFICE      PATIENT: VOICED UNDERSTANDING AND HAS NO FURTHER QUESTIONS AT THIS TIME    ADDITIONAL INFORMATION:

## 2024-09-17 NOTE — PROGRESS NOTES
"Subjective   Dixie Cervantes is a 73 y.o. female.   Chief Complaint   Patient presents with    Prediabetes     Last A1C 8/17/23  6.3         History of Present Illness   Patient is here for follow up for prediabetes,anxiety and insomnia. She takes ambien most nights, rarely takes xanax seemed to cause more anxiety.Has been having more acid reflux and epigastric pain recently. Used to take omeprazole, currently taking Tums and pepcid with no significant benefit. She is on Prolia for osteoporosis, stopped omeprazole because of this diagnosis, did not tolerate alendronate due to GERD  PT of her right ankle made the pain worse, scheduled for MRI of ankle and foot  Right breast has a \"blistery lesion\", noticed a week ago, put an antibiotic ointment on,   \"A little tender\", denies breast pain, mammogram was normal  Occasionally checks blood sugars    The following portions of the patient's history were reviewed and updated as appropriate: allergies, current medications, past family history, past medical history, past social history, past surgical history, and problem list.    Review of Systems    Objective   Physical Exam  Vitals reviewed.   Constitutional:       General: She is not in acute distress.     Appearance: Normal appearance. She is not ill-appearing, toxic-appearing or diaphoretic.   HENT:      Head: Normocephalic and atraumatic.   Cardiovascular:      Rate and Rhythm: Normal rate and regular rhythm.      Heart sounds: Normal heart sounds. No murmur heard.  Pulmonary:      Effort: No respiratory distress.      Breath sounds: Normal breath sounds.   Chest:       Abdominal:      Tenderness: There is abdominal tenderness (epigastric).   Neurological:      Mental Status: She is alert and oriented to person, place, and time.   Psychiatric:         Mood and Affect: Mood is anxious.         Thought Content: Thought content normal.         Judgment: Judgment normal.       /76 (BP Location: Right arm, Patient " RHEUMATOLOGY OUTPATIENT CLINIC NOTE     Referring Provider: Edward Zuleta MD     Lab review     KATHY: Negative  dsDNA:  Negative    Complement C3: normal  Complement C4: normal  ANCA by IFA: Negative   MPO: Negative    SC-3 Abs: Positive, mildly positive outside at [facility]    Cell count, right knee multiple aspirations   Latest Reference Range & Units 05/22/24 09:26 05/29/24 10:00 07/10/24 11:36 08/14/24 13:38 08/27/24 17:09   Total Nucleated Cells /uL 76,860 109,100 65,670 94,560 53,900     Skin biopsy 12/2023, [facility]:   A. Skin, left, forearm, punch biopsy - Leukocytoclastic vasculitis.   B. Skin biopsy, left, forearm, direct immunofluorescence (DIF) -     Negative immunofluorescence     Imaging review     MRI cervical spine, outside, only report available 3/2024:  C4-5 moderate central/left-sided canal stenosis with left ventral cord impingement.  No abnormal T2 signal  Mild central canal stenosis with cord abutment C3-4  Multilevel degenerative foraminal stenosis severe left C 3-4, C4-5 with expected nerve impingement  Prominent erosive/inflammatory facet arthropathy left C3-4, C4-5    MRI Right foot, 1/2024:  1. Enhancing marrow edema at the second metatarsal amputation margin as well as head and neck of the third and fourth metatarsals. Indeterminate faint T1 hypointense marrow signal changes of the fourth metatarsal head and about the displaced oblique fracture of the third metatarsal neck. Overall findings compatible with osteitis, possibly osteomyelitis.   2. Irregular soft tissue amputation margins of the medial forefoot with emanating enhancing tissue edema concerning for cellulitis     CT chest, Abdomen, pelvis without contrast, 12/2023:  1. Severe anasarca with moderate to large pleural effusions and a small pericardial effusion. Diffuse septal thickening in the lungs concerning for pulmonary edema. Anemic cardiac blood pool.   2. Solitary pulmonary nodule in the left lower lobe with an average  "Position: Sitting, Cuff Size: Adult)   Pulse 82   Temp 98.2 °F (36.8 °C) (Oral)   Ht 172.7 cm (67.99\")   Wt 70.7 kg (155 lb 12.8 oz)   SpO2 99%   BMI 23.69 kg/m²     Assessment & Plan   Problems Addressed this Visit          Endocrine and Metabolic    Prediabetes - Primary    Relevant Orders    POC Glycosylated Hemoglobin (Hb A1C) (Completed)       Gastrointestinal Abdominal     GERD (gastroesophageal reflux disease)    Relevant Medications    pantoprazole (PROTONIX) 40 MG EC tablet       Mental Health    OCTAVIO (generalized anxiety disorder)    Relevant Medications    zolpidem (AMBIEN) 5 MG tablet       Sleep    Insomnia    Relevant Medications    zolpidem (AMBIEN) 5 MG tablet     Other Visit Diagnoses       Epigastric pain        Relevant Medications    pantoprazole (PROTONIX) 40 MG EC tablet    Other Relevant Orders    H. Pylori Breath Test - Breath, Lung    Skin lesion of breast              Diagnoses         Codes Comments    Prediabetes    -  Primary ICD-10-CM: R73.03  ICD-9-CM: 790.29     Primary insomnia     ICD-10-CM: F51.01  ICD-9-CM: 307.42     Epigastric pain     ICD-10-CM: R10.13  ICD-9-CM: 789.06     Gastroesophageal reflux disease, unspecified whether esophagitis present     ICD-10-CM: K21.9  ICD-9-CM: 530.81     OCTAVIO (generalized anxiety disorder)     ICD-10-CM: F41.1  ICD-9-CM: 300.02     Skin lesion of breast     ICD-10-CM: L98.8  ICD-9-CM: 709.8           Results for orders placed or performed in visit on 11/27/23   POC Glycosylated Hemoglobin (Hb A1C)    Specimen: Blood   Result Value Ref Range    Hemoglobin A1C 5.9 (A) 4.5 - 5.7 %    Lot Number [id number]     Expiration Date 7/30/25      Check for H. Pylori. I will contact patient regarding test results and provide instructions regarding any necessary changes in plan of care.    Start pantoprazole, continue Prolia, notify endocrinologist of need for the Pantoprazole at least 3 months for management of worsening chronic GERD,  This patient is " diameter of 10 mm is indeterminate, but worrisome for neoplasm. This is new since 3/19/2022.   3. Patchy and confluent airspace consolidation within the left greater than right lung apices presumably represents infection, cannot exclude underlying soft tissue density nodularity on this noncontrast examination. Recommend pulmonary consultation.   4. Diffuse and severe vascular calcifications. No acute or suspicious abnormality noted within the abdomen or pelvis.     Subjective     Visit date September 17, 2024    Long is a 55 year old male who presents today for consult.    He has chronic arthralgia since his age of 49 involving hands, elbows, neck, back and knees that he attributes to injuries from falling as he used to be a . He denies swelling in the hands, elbows, feet.     He has been getting gel injections in his knees with [facility] orthopedics and in May 2024, he developed swelling in the right knee, he was evaluated in July by Orthopedics at [facility], due to concerns for septic arthritis, he underwent washout and treated with 4 weeks of antibiotics however he had recurrence of knee swelling and has been following with infectious disease since then. His infectious workup has been so far inconclusive however his cell count has been persistently elevated.    In November - December 2023, he was hospitalized at [facility] for bilateral lower extremity swelling, rash, infection in his right big toe, he underwent surgery including amputation, there was concerns for osteomyelitis in the right foot.  During his evaluation, he was evaluated by dermatology and underwent skin biopsy showing LCV. He was treated with antibiotics at the time during his hospital stay. He saw Nephrology for SALVADOR that was felt to be prerenal at the time vs antibiotic related during the time, he had ANCA serologies checked, ANCA by IFA was negative, MI-3 borderline positive, MPO antibody negative.    He denies recurrent sinus  "infections, epistaxis, hearing loss, uveitis, hemoptysis, hematuria.    He has chronic diarrhea and chronic pancreatitis, following with GI, unclear etiology but felt likely secondary to alcohol intake,  No reported inflammatory bowel disease on prior Colonoscopies    He has never smoked. He has not drank alcohol for over 2 years.  History of remote cocaine use, none recently.    No family history of autoimmune disease    Today, Long mentioned the following:    He was recently hospitalized for shortness of breath and felt to have pneumonia and treated with antibiotics. He had anemia and received blood transfusion, etiology of anemia is unknown.     He remains with pain and swelling in his right knee especially with ambulation.   He remains with some ankle swelling in the right ankle  He denies new or worsening arthritis in the hands, elbows, feet.     Denies recent fever but occasional chills  Denies recurrent sinus infections, epistaxis, hearing loss, uveitis, hemoptysis, hematuria, skin rash.     ROS    Current Medications   Azithromycin, cefdinir    Past Medical history   Past Medical History:   Diagnosis Date    Acute pain of right knee 07/12/2024    Allergic rhinitis     Anemia     Arthritis     Bell's palsy     Cervicalgia     Diabetes (H)     Diabetic foot ulcer (H)     Generalized edema     Hyperkalemia     Hypertension     Hypo-osmolality and hyponatremia     Hypoglycemia 05/03/2024    Major depressive disorder, recurrent episode, mild (H24)     Other acute osteomyelitis, right ankle and foot (H)     Other chronic pancreatitis (H)     Other polyosteoarthritis     Right knee pain 07/11/2024    Solitary pulmonary nodule        Objective   BP (!) 179/98   Pulse 82   Ht 1.651 m (5' 5\")   Wt 61.3 kg (135 lb 1.6 oz)   SpO2 96%   BMI 22.48 kg/m        PHYSICAL EXAMINATION  Physical Exam  HENT:      Head: Normocephalic.      Mouth/Throat:      Mouth: Mucous membranes are moist.   Eyes:      " on a controlled substance which improves symptoms/quality of life and is aware of the risks, benefits and possible side-effects current treatment. The patient denies any medication side-effects at this time. A controlled substance agreement will be obtained or is currently on file. We reviewed required monitoring for controlled substances including but not limited to quarterly follow-up visits, annual depression screening, and urine drug screens to which the patient is agreeable. A MADY report has been or shortly will be reviewed. There are no signs of deviation or misuse.   Continue ambien for insomnia, has not tolerated other several medications for anxiety  Skin lesion appears benign, patient will continue to monitor and follow up with her dermatologist  Prediabetes is controlled  Patient was encouraged to keep me informed of any acute changes, lack of improvement, or any new concerning symptoms.          Conjunctiva/sclera: Conjunctivae normal.   Pulmonary:      Effort: Pulmonary effort is normal.      Breath sounds: Normal breath sounds.   Musculoskeletal:         General: Swelling and tenderness present.      Comments: No noticeable swelling in the hands (MCP, PIP, DIP), wrists, elbows, ankles, feet.   Range of motion in the shoulders and hips are within accepted range for age.     Significant right knee swelling   Skin:     Findings: No rash.   Neurological:      Mental Status: He is alert.         Assessment & Plan     # Recurrent knee effusion, inflammatory cell count, concern for inflammatory arthropathy versus pigmented villonodular synovitis  # Chronic arthralgia  # Concern for inflammatory arthropathy    # Peripheral arterial disease  # History of right foot osteomyelitis status post amputation    # History of acute kidney injury, leukocytoclastic vasculitis, concern for ANCA vasculitis  # Chronic diarrhea, pancreatitis, concern for IgG4 related disease  # Multiple comorbidities [anemia, diabetes with complications, hypertension, hyperlipidemia]    Long is a very pleasant 55-year-old gentleman referred to rheumatology for evaluation of recurrent knee effusion.      He has been following with our colleagues in orthopedics and infectious disease for recurrent knee effusion, arthrocentesis revealed inflammatory cell count several times this year and based on infectious disease evaluation his workup for recurrent infection is inconclusive and there is no evidence of ongoing septic arthritis.  He has chronic arthralgias involving the neck, low back so unclear if there is an underlying spondylarthritis or inflammatory arthropathy.  Outside MRI is showing extensive cervical disease and raising concerns for probable facet arthropathy.  He is planned for right knee MRI by infectious disease which is reasonable, I also recommended repeat evaluation with arthrocentesis by our colleagues in orthopedics and  consideration to send for cytology to rule out pigmented villonodular synovitis.    We will undergo additional testing to evaluate for inflammatory arthropathy by getting x-rays of the hands, SI joint, left foot and check serology rheumatoid factor, CCP, HLA-B27.    He also has history of acute kidney injury, leukocytoclastic vasculitis however his ANCA's were discordant with mildly positive ND-3 antibody with negative immunofluorescence.  Therefore we discussed repeating his ANCA serologies, check urinalysis as well  to evaluate for possibility of ANCA associated vasculitis.  He has a history of pulmonary nodule and there was a plan for pulmonary referral, unclear if this was pursued at [facility].  This may need to be reevaluated based on test results.    He has chronic diarrhea and chronic pancreatitis, he has been evaluated by gastroenterology previously and seems like pancreatitis versus attributable to alcohol use, nonetheless given concerns of underlying autoimmune disease and reasonable to check for IgG levels with subclasses to rule out IgG4 related disease.    I will see him back after test results to discuss the next steps.    Plan:  - X-rays hands, left foot, SI joint to evaluate for inflammatory arthropathy  - Rheumatoid factor, CCP, HLA-B27  - ANCA serology, urinalysis   - IgG level with subclasses  - Agree with MRI of the right knee, arthrocentesis and cytology.      80 minutes spent by me on the date of the encounter doing chart review, history and exam, documentation and further activities per the note      Return in about 6 weeks (around 10/29/2024).    Hector Cruz MD  [facility] RHEUMATOLOGY

## 2024-10-02 ENCOUNTER — OFFICE VISIT (OUTPATIENT)
Dept: FAMILY MEDICINE CLINIC | Facility: CLINIC | Age: 74
End: 2024-10-02
Payer: MEDICARE

## 2024-10-02 VITALS
HEART RATE: 78 BPM | DIASTOLIC BLOOD PRESSURE: 86 MMHG | BODY MASS INDEX: 24.86 KG/M2 | HEIGHT: 68 IN | WEIGHT: 164 LBS | SYSTOLIC BLOOD PRESSURE: 122 MMHG | OXYGEN SATURATION: 98 %

## 2024-10-02 DIAGNOSIS — G56.22 NEUROPATHY, ULNAR AT ELBOW, LEFT: Primary | ICD-10-CM

## 2024-10-02 PROCEDURE — 1125F AMNT PAIN NOTED PAIN PRSNT: CPT | Performed by: STUDENT IN AN ORGANIZED HEALTH CARE EDUCATION/TRAINING PROGRAM

## 2024-10-02 PROCEDURE — 3074F SYST BP LT 130 MM HG: CPT | Performed by: STUDENT IN AN ORGANIZED HEALTH CARE EDUCATION/TRAINING PROGRAM

## 2024-10-02 PROCEDURE — 99213 OFFICE O/P EST LOW 20 MIN: CPT | Performed by: STUDENT IN AN ORGANIZED HEALTH CARE EDUCATION/TRAINING PROGRAM

## 2024-10-02 PROCEDURE — 3079F DIAST BP 80-89 MM HG: CPT | Performed by: STUDENT IN AN ORGANIZED HEALTH CARE EDUCATION/TRAINING PROGRAM

## 2024-10-02 NOTE — PROGRESS NOTES
Established Office Visit      Patient Name: Dixie Cervantes  : 1950   MRN: [id number]   Care Team: Patient Care Team:  Jannette Tyler DO as PCP - General (Internal Medicine)  Camden Franco MD (Neurosurgery)  Jason Pino MD as Consulting Physician (Pulmonary Disease)  Fady Lane MD as Consulting Physician (Otolaryngology)  Clive Chin (Endocrinology)    Chief Complaint:  No chief complaint on file.      History of Present Illness: Dixie Cervantes is a 73 y.o. female with HTN, HLD, prediabetes, osteoporosis, insomnia who is here today to follow up with ulnar neuropathy.    Patient has experienced several years of intermittent left 5th digit numbness, left hand spasming, elbow pain. She reports she has modified activities - tries to avoid resting on her elbows or applying pressure to her elbows. This helps. Swimming helps.    Has not had hand spasm since middle of August. She struggles with arthritis as well which interferes with her  strength. She does have to be cognizant about how she holds things to avoid dropping items but feels this is due to arthritis rather than neuropathy.   She sleeps on her left side with her elbow bent beneath her     Subjective      Review of Systems:   Review of Systems - See HPI    I have reviewed and the following portions of the patient's history were updated as appropriate: past family history, past medical history, past social history, past surgical history and problem list.    Medications:     Current Outpatient Medications:     ALPRAZolam (XANAX) 0.25 MG tablet, Take 0.5 tablets by mouth Daily As Needed for Anxiety., Disp: 15 tablet, Rfl: 2    denosumab (Prolia) 60 MG/ML solution prefilled syringe syringe, Prolia 60 mg/mL subcutaneous syringe  60 mg subcutaneous every 6 months, Disp: , Rfl:     diclofenac (VOLTAREN) 1 % gel gel, As Needed., Disp: , Rfl:     glucosamine-chondroitin 500-400 MG capsule capsule, Take 1 capsule  "by mouth 3 (Three) Times a Day With Meals., Disp: , Rfl:     lidocaine (LIDODERM) 5 %, Place 1 patch on the skin as directed by provider Daily. Remove & Discard patch within 12 hours or as directed by MD, Disp: 90 patch, Rfl: 1    losartan (Cozaar) 50 MG tablet, Take 1 tablet by mouth Daily., Disp: 90 tablet, Rfl: 1    Multiple Vitamins-Minerals (MULTI VITAMIN/MINERALS) tablet, Take 1 tablet by mouth., Disp: , Rfl:     nystatin (MYCOSTATIN) 053631 UNIT/GM cream, Apply  topically to the appropriate area as directed As Needed (yeast rash)., Disp: 30 g, Rfl: 2    pantoprazole (PROTONIX) 40 MG EC tablet, Take 1 tablet by mouth 2 (Two) Times a Day. Take 1 tablet by mouth 2 (Two) Times a Day half hour before meals, Disp: 60 tablet, Rfl: 11    rosuvastatin (Crestor) 10 MG tablet, Take 1 tablet by mouth Every Night., Disp: 90 tablet, Rfl: 1    thiamine (VITAMIN B-1) 100 MG tablet  tablet, Take 1 tablet by mouth Daily., Disp: , Rfl:     zolpidem (AMBIEN) 5 MG tablet, Take 1-2 tablets by mouth At Night As Needed for Sleep., Disp: 50 tablet, Rfl: 2    Allergies:   Allergies   Allergen Reactions    Celebrex [Celecoxib] Angioedema    Sulfa Antibiotics Swelling    Alendronate Sodium Hives    Ketek [Telithromycin] Other (See Comments)     Migraine    Metoclopramide Hives    Promethazine Hives    Sulfate Hives    Bupropion Irritability       Objective     Physical Exam:  Vital Signs:   Vitals:    10/02/24 0909   BP: 122/86   Pulse: 78   SpO2: 98%   Weight: 74.4 kg (164 lb)   Height: 172.7 cm (67.99\")     Body mass index is 24.94 kg/m².     Physical Exam  Vitals reviewed.   Constitutional:       Appearance: Normal appearance.   Cardiovascular:      Rate and Rhythm: Normal rate.   Pulmonary:      Effort: Pulmonary effort is normal. No respiratory distress.   Musculoskeletal:      Comments: No elbow swelling, deformity, or tenderness. Left  strength normal. No atrophy.   Neurological:      Mental Status: She is alert. "   Psychiatric:         Mood and Affect: Mood normal.         Behavior: Behavior normal.         Judgment: Judgment normal.         Assessment / Plan      Assessment/Plan:   Problems Addressed This Visit  Diagnoses and all orders for this visit:    1. 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 Offered PT referral but she politely declines at this time. She is not interested in invasive therapy ie surgical intervention either. Will consider this in the future if symptoms worsen/persist.     Plan of care reviewed with patient at the conclusion of today's visit. Education was provided regarding diagnosis and management.  Patient verbalizes understanding of and agreement with management plan.    Follow Up: as currently scheduled for routine follow up in 11/2024   No follow-ups on file.        DO IZAIAH Sweet RD  [facility]  [address]  [place], located within [facility] [address]  Fax [phone]  Phone [phone]

## 2024-10-29 ENCOUNTER — OFFICE VISIT (OUTPATIENT)
Dept: FAMILY MEDICINE CLINIC | Facility: CLINIC | Age: 74
End: 2024-10-29
Payer: MEDICARE

## 2024-10-29 VITALS
SYSTOLIC BLOOD PRESSURE: 146 MMHG | DIASTOLIC BLOOD PRESSURE: 88 MMHG | OXYGEN SATURATION: 95 % | WEIGHT: 164.4 LBS | HEART RATE: 87 BPM | HEIGHT: 68 IN | BODY MASS INDEX: 24.92 KG/M2

## 2024-10-29 DIAGNOSIS — R59.0 LEFT CERVICAL LYMPHADENOPATHY: Primary | ICD-10-CM

## 2024-10-29 PROCEDURE — 3077F SYST BP >= 140 MM HG: CPT | Performed by: FAMILY MEDICINE

## 2024-10-29 PROCEDURE — 1125F AMNT PAIN NOTED PAIN PRSNT: CPT | Performed by: FAMILY MEDICINE

## 2024-10-29 PROCEDURE — 1160F RVW MEDS BY RX/DR IN RCRD: CPT | Performed by: FAMILY MEDICINE

## 2024-10-29 PROCEDURE — 99213 OFFICE O/P EST LOW 20 MIN: CPT | Performed by: FAMILY MEDICINE

## 2024-10-29 PROCEDURE — 3079F DIAST BP 80-89 MM HG: CPT | Performed by: FAMILY MEDICINE

## 2024-10-29 PROCEDURE — 1159F MED LIST DOCD IN RCRD: CPT | Performed by: FAMILY MEDICINE

## 2024-10-29 RX ORDER — AMOXICILLIN 875 MG/1
875 TABLET, COATED ORAL 2 TIMES DAILY
Qty: 14 TABLET | Refills: 0 | Status: SHIPPED | OUTPATIENT
Start: 2024-10-29 | End: 2024-11-05

## 2024-10-29 NOTE — PROGRESS NOTES
Office Note     Name: Dixie Cervantes    : 1950     MRN: [id number]     Chief Complaint  Mass (Pt states she has a small pea sized lump on the left side of her neck on her jaw bone , painful. Noticed it 3-4 days ago. Possibly getting bigger ) and Ear Problem (Pt states she has had problems with her right ear and it cause her some dizziness )    Subjective     History of Present Illness:  Dixie Cervantes is a 73 y.o. female who presents today for left neck swelling.    Patient notes that around 3 days ago she noticed a small pea-sized swelling on her left neck right behind her jawbone.  She does that is tender to touch and mobile.  She notes that it came on suddenly.  She denies any fevers or recent illnesses.  She might have a stomach light sore throat today.  She also notes that she has chronic ear condition and pain as she frequently swims and has had difficulty with ear protection in the past.    Review of Systems:   Review of Systems   HENT:  Positive for swollen glands.    All other systems reviewed and are negative.      Past Medical History:   Past Medical History:   Diagnosis Date    Chronic cough     thought to be cough variant asthma    Chronic low back pain     Closed right ankle fracture 2020    Cornea abrasion     Diverticulosis     Esophageal spasm     External otitis     Recurring    OCTAVIO (generalized anxiety disorder)     GERD (gastroesophageal reflux disease)     History of bladder infections     Insomnia     Inverted nipple     Left    Irritable bowel syndrome (IBS)     Osteoporosis     Parasitic infection     History of multiple infections    Urinary, incontinence, stress female     Vestibular vertigo        Past Surgical History:   Past Surgical History:   Procedure Laterality Date    ANKLE SURGERY Right 2020    [facility]    ANKLE SURGERY Right 2023    APPENDECTOMY      BREAST CYST ASPIRATION Right     CHOLECYSTECTOMY      CYST REMOVAL      Multiple Scalp cysts     CYST REMOVAL  2019    Located on neck    CYSTOSCOPY      LAPAROTOMY OOPHERECTOMY Right     MOLE REMOVAL      Multiple    OOPHORECTOMY Right     ORIF FIBULA FRACTURE Right 01/23/2020    R lateral mallolus fibula ankle fracture    THYROID BIOPSY      Needle biopsy       Family History:   Family History   Problem Relation Age of Onset    Diabetes Mother     Arthritis Mother     Migraines Mother     Stroke Mother     Hypertension Mother     Prostate cancer Father     Hypertension Father     Hyperlipidemia Sister     Hypertension Sister     Hyperlipidemia Brother     Hypertension Brother     Hyperlipidemia Brother     Breast cancer Neg Hx     Ovarian cancer Neg Hx        Social History:   Social History     Socioeconomic History    Marital status:    Tobacco Use    Smoking status: Never     Passive exposure: Never    Smokeless tobacco: Never   Vaping Use    Vaping status: Never Used   Substance and Sexual Activity    Alcohol use: Yes     Comment: 2-3 drinks a month    Drug use: No    Sexual activity: Not Currently       Immunizations:   Immunization History   Administered Date(s) Administered    Arexvy (RSV, Adults 60+ yrs) 09/12/2023    COVID-19 (PFIZER) 12YRS+ (COMIRNATY) 09/23/2023, 03/07/2024, 09/24/2024    COVID-19 (PFIZER) BIVALENT 12+YRS 09/08/2022, 04/24/2023    COVID-19 (PFIZER) Purple Cap Monovalent 01/22/2021, 02/13/2021, 09/24/2021    Covid-19 (Pfizer) Gray Cap Monovalent 04/01/2022    FLUAD TRI 65YR+ 10/10/2017, 10/02/2019, 09/01/2020    Fluzone High-Dose 65+YRS 10/15/2017, 10/15/2018, 10/02/2019    Fluzone High-Dose 65+yrs 08/31/2020, 08/17/2021, 09/08/2022, 09/23/2023    Influenza Whole 10/01/2016    Pneumococcal Conjugate 13-Valent (PCV13) 01/01/2014    Pneumococcal Polysaccharide (PPSV23) 01/01/2015    Tdap 01/01/2014    Zostavax 01/01/2014        Medications:     Current Outpatient Medications:     ALPRAZolam (XANAX) 0.25 MG tablet, Take 0.5 tablets by mouth Daily As Needed for Anxiety., Disp:  "15 tablet, Rfl: 2    denosumab (Prolia) 60 MG/ML solution prefilled syringe syringe, Prolia 60 mg/mL subcutaneous syringe  60 mg subcutaneous every 6 months, Disp: , Rfl:     diclofenac (VOLTAREN) 1 % gel gel, As Needed., Disp: , Rfl:     glucosamine-chondroitin 500-400 MG capsule capsule, Take 1 capsule by mouth 3 (Three) Times a Day With Meals., Disp: , Rfl:     lidocaine (LIDODERM) 5 %, Place 1 patch on the skin as directed by provider Daily. Remove & Discard patch within 12 hours or as directed by MD, Disp: 90 patch, Rfl: 1    losartan (Cozaar) 50 MG tablet, Take 1 tablet by mouth Daily., Disp: 90 tablet, Rfl: 1    Multiple Vitamins-Minerals (MULTI VITAMIN/MINERALS) tablet, Take 1 tablet by mouth., Disp: , Rfl:     nystatin (MYCOSTATIN) 544480 UNIT/GM cream, Apply  topically to the appropriate area as directed As Needed (yeast rash)., Disp: 30 g, Rfl: 2    pantoprazole (PROTONIX) 40 MG EC tablet, Take 1 tablet by mouth 2 (Two) Times a Day. Take 1 tablet by mouth 2 (Two) Times a Day half hour before meals, Disp: 60 tablet, Rfl: 11    rosuvastatin (Crestor) 10 MG tablet, Take 1 tablet by mouth Every Night., Disp: 90 tablet, Rfl: 1    thiamine (VITAMIN B-1) 100 MG tablet  tablet, Take 1 tablet by mouth Daily., Disp: , Rfl:     zolpidem (AMBIEN) 5 MG tablet, Take 1-2 tablets by mouth At Night As Needed for Sleep., Disp: 50 tablet, Rfl: 2    amoxicillin (AMOXIL) 875 MG tablet, Take 1 tablet by mouth 2 (Two) Times a Day for 7 days., Disp: 14 tablet, Rfl: 0    Allergies:   Allergies   Allergen Reactions    Celebrex [Celecoxib] Angioedema    Sulfa Antibiotics Swelling    Alendronate Sodium Hives    Ketek [Telithromycin] Other (See Comments)     Migraine    Metoclopramide Hives    Promethazine Hives    Sulfate Hives    Bupropion Irritability       Objective     Vital Signs  /88   Pulse 87   Ht 172.7 cm (67.99\")   Wt 74.6 kg (164 lb 6.4 oz)   SpO2 95%   BMI 25.00 kg/m²   Estimated body mass index is 25 kg/m² as " "calculated from the following:    Height as of this encounter: 172.7 cm (67.99\").    Weight as of this encounter: 74.6 kg (164 lb 6.4 oz).         Physical Exam  Vitals and nursing note reviewed.   Constitutional:       Appearance: Normal appearance. She is normal weight.   HENT:      Head: Normocephalic and atraumatic.      Nose: Nose normal.      Mouth/Throat:      Mouth: Mucous membranes are moist.   Eyes:      Extraocular Movements: Extraocular movements intact.      Pupils: Pupils are equal, round, and reactive to light.   Neck:     Cardiovascular:      Rate and Rhythm: Normal rate.   Pulmonary:      Effort: Pulmonary effort is normal.   Abdominal:      General: Abdomen is flat.   Musculoskeletal:         General: Normal range of motion.      Cervical back: Normal range of motion.   Lymphadenopathy:      Cervical:      Left cervical: Superficial cervical adenopathy present.   Skin:     General: Skin is warm.   Neurological:      General: No focal deficit present.      Mental Status: She is alert and oriented to person, place, and time.   Psychiatric:         Mood and Affect: Mood normal.         Behavior: Behavior normal.         Thought Content: Thought content normal.         Judgment: Judgment normal.            Procedures     Results:  No results found for this or any previous visit (from the past 24 hours).     Assessment and Plan     Assessment/Plan:  Diagnoses and all orders for this visit:    1. Left cervical lymphadenopathy (Primary)  Assessment & Plan:  Will prescribe a course of amoxicillin and continue monitoring of symptoms.  Advised patient if new symptom present such as worsening swelling, fever, or systemic symptoms to please reach out for follow-up evaluation.    Orders:  -     amoxicillin (AMOXIL) 875 MG tablet; Take 1 tablet by mouth 2 (Two) Times a Day for 7 days.  Dispense: 14 tablet; Refill: 0        Follow Up  Return if symptoms worsen or fail to improve.    Meeta Sanchez, DO "   [facility]

## 2024-10-29 NOTE — ASSESSMENT & PLAN NOTE
Will prescribe a course of amoxicillin and continue monitoring of symptoms.  Advised patient if new symptom present such as worsening swelling, fever, or systemic symptoms to please reach out for follow-up evaluation.

## 2024-11-26 DIAGNOSIS — F51.01 PRIMARY INSOMNIA: ICD-10-CM

## 2024-11-26 RX ORDER — ZOLPIDEM TARTRATE 5 MG/1
TABLET ORAL
Qty: 50 TABLET | Refills: 0 | Status: SHIPPED | OUTPATIENT
Start: 2024-11-26

## 2024-11-26 NOTE — TELEPHONE ENCOUNTER
Rx Refill Note  Requested Prescriptions     Pending Prescriptions Disp Refills    zolpidem (AMBIEN) 5 MG tablet [Pharmacy Med Name: ZOLPIDEM 5MG TABLETS] 50 tablet      Sig: TAKE 1 TO 2 TABLETS BY MOUTH AT NIGHT AS NEEDED FOR SLEEP      Last office visit with prescribing clinician: 10/2/2024   Last telemedicine visit with prescribing clinician: Visit date not found   Next office visit with prescribing clinician: 11/27/2024                         Would you like a call back once the refill request has been completed: [] Yes [] No    If the office needs to give you a call back, can they leave a voicemail: [] Yes [] No    Bonita Enriquez MA  11/26/24, 11:29 EST

## 2024-11-27 ENCOUNTER — OFFICE VISIT (OUTPATIENT)
Dept: FAMILY MEDICINE CLINIC | Facility: CLINIC | Age: 74
End: 2024-11-27
Payer: MEDICARE

## 2024-11-27 VITALS
HEIGHT: 68 IN | HEART RATE: 78 BPM | OXYGEN SATURATION: 97 % | SYSTOLIC BLOOD PRESSURE: 124 MMHG | DIASTOLIC BLOOD PRESSURE: 80 MMHG | BODY MASS INDEX: 24.86 KG/M2 | WEIGHT: 164 LBS

## 2024-11-27 DIAGNOSIS — F51.01 PRIMARY INSOMNIA: Primary | ICD-10-CM

## 2024-11-27 DIAGNOSIS — J45.40 MODERATE PERSISTENT ASTHMA WITHOUT COMPLICATION: ICD-10-CM

## 2024-11-27 DIAGNOSIS — Z51.81 MEDICATION MONITORING ENCOUNTER: ICD-10-CM

## 2024-11-27 DIAGNOSIS — N95.2 VAGINAL ATROPHY: ICD-10-CM

## 2024-11-27 DIAGNOSIS — J45.991 COUGH VARIANT ASTHMA: ICD-10-CM

## 2024-11-27 DIAGNOSIS — F51.01 PRIMARY INSOMNIA: ICD-10-CM

## 2024-11-27 RX ORDER — CONJUGATED ESTROGENS 0.62 MG/G
CREAM VAGINAL
Qty: 30 G | Refills: 2 | Status: SHIPPED | OUTPATIENT
Start: 2024-11-28

## 2024-11-27 RX ORDER — ALBUTEROL SULFATE 90 UG/1
2 INHALANT RESPIRATORY (INHALATION) EVERY 4 HOURS PRN
Qty: 18 G | Refills: 2 | Status: SHIPPED | OUTPATIENT
Start: 2024-11-27

## 2024-11-27 RX ORDER — FLUTICASONE FUROATE AND VILANTEROL 100; 25 UG/1; UG/1
1 POWDER RESPIRATORY (INHALATION)
Qty: 28 EACH | Refills: 2 | Status: SHIPPED | OUTPATIENT
Start: 2024-11-27

## 2024-11-27 NOTE — PROGRESS NOTES
Established Office Visit      Patient Name: Dixie Cervantes  : 1950   MRN: [id number]   Care Team: Patient Care Team:  Jannette Tyler DO as PCP - General (Internal Medicine)  Camden Franco MD (Neurosurgery)  Jason Pino MD as Consulting Physician (Pulmonary Disease)  Fady Lane MD as Consulting Physician (Otolaryngology)  Clive Chin (Endocrinology)    Chief Complaint:    Chief Complaint   Patient presents with    Insomnia       History of Present Illness: Dixie Cervantes is a 74 y.o. female with HTN, HLD, prediabetes, osteoporosis, insomnia  who is here today to follow up with insomnia.     She reports intermittently productive cough onoging every day and becoming increasingly bothersome. Ongoing for years. No wheezing or SOA. No fever. She has tried taking several different OTC antihistamines, 1-2x per day without relief. Intolerance to flonase and this did not help.   She has hx of cough variant asthma - reactive to dust and cold air    She has tried primerin in the past and is interested in restarting this to help with vaginal atrophy. She has vaginal itchiness, dryness. Quintana is painful at times.     Taking ambien to help with sleep. No problems with this, tolerating well and very effective.     Subjective      Review of Systems:   Review of Systems - See HPI    I have reviewed and the following portions of the patient's history were updated as appropriate: past family history, past medical history, past social history, past surgical history and problem list.    Medications:     Current Outpatient Medications:     ALPRAZolam (XANAX) 0.25 MG tablet, Take 0.5 tablets by mouth Daily As Needed for Anxiety., Disp: 15 tablet, Rfl: 2    denosumab (Prolia) 60 MG/ML solution prefilled syringe syringe, Prolia 60 mg/mL subcutaneous syringe  60 mg subcutaneous every 6 months, Disp: , Rfl:     diclofenac (VOLTAREN) 1 % gel gel, As Needed., Disp: , Rfl:      glucosamine-chondroitin 500-400 MG capsule capsule, Take 1 capsule by mouth 3 (Three) Times a Day With Meals., Disp: , Rfl:     lidocaine (LIDODERM) 5 %, Place 1 patch on the skin as directed by provider Daily. Remove & Discard patch within 12 hours or as directed by MD, Disp: 90 patch, Rfl: 1    losartan (Cozaar) 50 MG tablet, Take 1 tablet by mouth Daily., Disp: 90 tablet, Rfl: 1    Multiple Vitamins-Minerals (MULTI VITAMIN/MINERALS) tablet, Take 1 tablet by mouth., Disp: , Rfl:     nystatin (MYCOSTATIN) 440592 UNIT/GM cream, Apply  topically to the appropriate area as directed As Needed (yeast rash)., Disp: 30 g, Rfl: 2    pantoprazole (PROTONIX) 40 MG EC tablet, Take 1 tablet by mouth 2 (Two) Times a Day. Take 1 tablet by mouth 2 (Two) Times a Day half hour before meals, Disp: 60 tablet, Rfl: 11    rosuvastatin (Crestor) 10 MG tablet, Take 1 tablet by mouth Every Night., Disp: 90 tablet, Rfl: 1    thiamine (VITAMIN B-1) 100 MG tablet  tablet, Take 1 tablet by mouth Daily., Disp: , Rfl:     zolpidem (AMBIEN) 5 MG tablet, TAKE 1 TO 2 TABLETS BY MOUTH AT NIGHT AS NEEDED FOR SLEEP, Disp: 50 tablet, Rfl: 0    albuterol sulfate  (90 Base) MCG/ACT inhaler, Inhale 2 puffs Every 4 (Four) Hours As Needed for Wheezing or Shortness of Air (coughing)., Disp: 18 g, Rfl: 2    [START ON 11/28/2024] Estrogens Conjugated (Premarin) 0.625 MG/GM vaginal cream, Insert 1g into the vaginal twice weekly, Disp: 30 g, Rfl: 2    Fluticasone Furoate-Vilanterol (Breo Ellipta) 100-25 MCG/ACT aerosol powder , Inhale 1 puff Daily., Disp: 28 each, Rfl: 2    Allergies:   Allergies   Allergen Reactions    Celebrex [Celecoxib] Angioedema    Sulfa Antibiotics Swelling    Alendronate Sodium Hives    Ketek [Telithromycin] Other (See Comments)     Migraine    Metoclopramide Hives    Promethazine Hives    Sulfate Hives    Bupropion Irritability       Objective     Physical Exam:  Vital Signs:   Vitals:    11/27/24 1039   BP: 124/80   Pulse: 78  "  SpO2: 97%   Weight: 74.4 kg (164 lb)   Height: 172.7 cm (67.99\")     Body mass index is 24.94 kg/m².     Physical Exam  Vitals reviewed.   Constitutional:       Appearance: Normal appearance. She is not ill-appearing.   Cardiovascular:      Rate and Rhythm: Normal rate and regular rhythm.   Pulmonary:      Effort: Pulmonary effort is normal. No respiratory distress.      Breath sounds: Normal breath sounds. No wheezing.   Skin:     General: Skin is warm and dry.   Neurological:      Mental Status: She is alert.   Psychiatric:         Mood and Affect: Mood normal.         Behavior: Behavior normal.         Judgment: Judgment normal.         Assessment / Plan      Assessment/Plan:   Problems Addressed This Visit  Diagnoses and all orders for this visit:    1. Primary insomnia (Primary)  -     Compliance Drug Analysis, Ur - Urine, Clean Catch; Future    2. Medication monitoring encounter  -     Compliance Drug Analysis, Ur - Urine, Clean Catch; Future    3. Vaginal atrophy  -     Estrogens Conjugated (Premarin) 0.625 MG/GM vaginal cream; Insert 1g into the vaginal twice weekly  Dispense: 30 g; Refill: 2    4. Moderate persistent asthma without complication  -     Fluticasone Furoate-Vilanterol (Breo Ellipta) 100-25 MCG/ACT aerosol powder ; Inhale 1 puff Daily.  Dispense: 28 each; Refill: 2  -     albuterol sulfate  (90 Base) MCG/ACT inhaler; Inhale 2 puffs Every 4 (Four) Hours As Needed for Wheezing or Shortness of Air (coughing).  Dispense: 18 g; Refill: 2    5. Cough variant asthma  -     Fluticasone Furoate-Vilanterol (Breo Ellipta) 100-25 MCG/ACT aerosol powder ; Inhale 1 puff Daily.  Dispense: 28 each; Refill: 2  -     albuterol sulfate  (90 Base) MCG/ACT inhaler; Inhale 2 puffs Every 4 (Four) Hours As Needed for Wheezing or Shortness of Air (coughing).  Dispense: 18 g; Refill: 2        Insomnia - has been taking ambien 5-10mg qhs prn for several years. She avoids taking 2 tablets on days she needs " to wake up early. CSA And UDS updated today    Ulnar neuropathy has improved and nearly resolved with coservative management    HTN - well controlled with losartan 25mg daily (only taking half tablet of losartan)     HLD - well controlled with rosuvastatin 5mg weekly (only taking half tablet of rosuvastatin)     Prediabetes- A1c 6.3%, encouraged dietary modifications. Plan for A1c next visit.     Vaginal atrophy related to menopause - restart premarin twice weekly     Cough variant asthma - trial of breo and albuterol inhaler prn    Plan of care reviewed with patient at the conclusion of today's visit. Education was provided regarding diagnosis and management.  Patient verbalizes understanding of and agreement with management plan.    Follow Up:   Return in about 3 months (around 2/27/2025).        DO IZAIAH Sweet RD  [facility]  [address]  [place], located within [facility] [address]  Fax [phone]  Phone [phone]

## 2024-12-04 LAB — DRUGS UR: NORMAL

## 2024-12-10 ENCOUNTER — TELEPHONE (OUTPATIENT)
Dept: FAMILY MEDICINE CLINIC | Facility: CLINIC | Age: 74
End: 2024-12-10
Payer: MEDICARE

## 2024-12-10 NOTE — TELEPHONE ENCOUNTER
Caller: Dixie Cervantes    Relationship: Self    Best call back number: [phone]    Which medication are you concerned about: BREO    Who prescribed you this medication: DR VIEIRA    When did you start taking this medication: 20 DAYS AGO    What are your concerns: THIS MEDICATION IS MAKING PATIENT REALLY TIRED AND INTERRUPTING HER SLEEP SO SHE STOPPED TAKING IT AFTER 20 DAYS. PATIENT DOES NOT WANT TO TRY ANYTHING ELSE AT THIS TIME.

## 2024-12-22 DIAGNOSIS — F51.01 PRIMARY INSOMNIA: ICD-10-CM

## 2024-12-26 NOTE — TELEPHONE ENCOUNTER
Rx Refill Note  Requested Prescriptions     Pending Prescriptions Disp Refills    zolpidem (AMBIEN) 5 MG tablet [Pharmacy Med Name: ZOLPIDEM 5MG TABLETS] 50 tablet      Sig: TAKE 1 TO 2 TABLETS BY MOUTH AT NIGHT AS NEEDED FOR SLEEP      Last office visit with prescribing clinician: 11/27/2024   Last telemedicine visit with prescribing clinician: Visit date not found   Next office visit with prescribing clinician: 2/27/2025                         Would you like a call back once the refill request has been completed: [] Yes [] No    If the office needs to give you a call back, can they leave a voicemail: [] Yes [] No    Shantelle Navarrete MA  12/26/24, 13:52 EST

## 2024-12-27 RX ORDER — ZOLPIDEM TARTRATE 5 MG/1
TABLET ORAL
Qty: 50 TABLET | Refills: 0 | Status: SHIPPED | OUTPATIENT
Start: 2024-12-27

## 2025-01-28 DIAGNOSIS — F51.01 PRIMARY INSOMNIA: ICD-10-CM

## 2025-01-29 RX ORDER — ZOLPIDEM TARTRATE 5 MG/1
TABLET ORAL
Qty: 50 TABLET | Refills: 0 | Status: SHIPPED | OUTPATIENT
Start: 2025-01-29

## 2025-01-29 NOTE — TELEPHONE ENCOUNTER
Rx Refill Note  Requested Prescriptions     Pending Prescriptions Disp Refills    zolpidem (AMBIEN) 5 MG tablet [Pharmacy Med Name: ZOLPIDEM 5MG TABLETS] 50 tablet      Sig: TAKE 1 TO 2 TABLETS BY MOUTH AT NIGHT AS NEEDED FOR SLEEP      Last office visit with prescribing clinician: 11/27/2024   Last telemedicine visit with prescribing clinician: Visit date not found   Next office visit with prescribing clinician: 2/27/2025                         Would you like a call back once the refill request has been completed: [] Yes [] No    If the office needs to give you a call back, can they leave a voicemail: [] Yes [] No    Bonita Enriquez MA  01/29/25, 08:49 EST

## 2025-02-27 ENCOUNTER — OFFICE VISIT (OUTPATIENT)
Dept: FAMILY MEDICINE CLINIC | Facility: CLINIC | Age: 75
End: 2025-02-27
Payer: MEDICARE

## 2025-02-27 ENCOUNTER — TELEPHONE (OUTPATIENT)
Dept: FAMILY MEDICINE CLINIC | Facility: CLINIC | Age: 75
End: 2025-02-27

## 2025-02-27 VITALS
HEIGHT: 68 IN | DIASTOLIC BLOOD PRESSURE: 72 MMHG | OXYGEN SATURATION: 96 % | HEART RATE: 75 BPM | SYSTOLIC BLOOD PRESSURE: 122 MMHG | WEIGHT: 165 LBS | BODY MASS INDEX: 25.01 KG/M2

## 2025-02-27 DIAGNOSIS — E78.2 MIXED HYPERLIPIDEMIA: ICD-10-CM

## 2025-02-27 DIAGNOSIS — N95.8 GENITOURINARY SYNDROME OF MENOPAUSE: ICD-10-CM

## 2025-02-27 DIAGNOSIS — I10 PRIMARY HYPERTENSION: ICD-10-CM

## 2025-02-27 DIAGNOSIS — R73.03 PREDIABETES: Primary | ICD-10-CM

## 2025-02-27 DIAGNOSIS — N95.2 VAGINAL ATROPHY: ICD-10-CM

## 2025-02-27 DIAGNOSIS — F51.01 PRIMARY INSOMNIA: ICD-10-CM

## 2025-02-27 DIAGNOSIS — J45.40 MODERATE PERSISTENT ASTHMA WITHOUT COMPLICATION: ICD-10-CM

## 2025-02-27 LAB
EXPIRATION DATE: ABNORMAL
HBA1C MFR BLD: 6.4 % (ref 4.5–5.7)
Lab: ABNORMAL

## 2025-02-27 RX ORDER — LOSARTAN POTASSIUM 50 MG/1
50 TABLET ORAL DAILY
Qty: 90 TABLET | Refills: 1 | Status: SHIPPED | OUTPATIENT
Start: 2025-02-27

## 2025-02-27 RX ORDER — ESTRADIOL 0.1 MG/G
1 CREAM VAGINAL 2 TIMES WEEKLY
Qty: 42.5 G | Refills: 2 | Status: SHIPPED | OUTPATIENT
Start: 2025-02-27

## 2025-02-27 RX ORDER — ROSUVASTATIN CALCIUM 10 MG/1
10 TABLET, COATED ORAL NIGHTLY
Qty: 90 TABLET | Refills: 1 | Status: SHIPPED | OUTPATIENT
Start: 2025-02-27

## 2025-02-27 RX ORDER — ZOLPIDEM TARTRATE 5 MG/1
TABLET ORAL
Qty: 50 TABLET | Refills: 0 | Status: SHIPPED | OUTPATIENT
Start: 2025-02-27

## 2025-02-27 RX ORDER — BUDESONIDE 90 UG/1
1 AEROSOL, POWDER RESPIRATORY (INHALATION)
Qty: 1 EACH | Refills: 11 | Status: SHIPPED | OUTPATIENT
Start: 2025-02-27

## 2025-02-27 NOTE — TELEPHONE ENCOUNTER
Caller: Dixie Cervantes    Relationship: Self    Best call back number: [phone]     Which medication are you concerned about: Estrogens Conjugated (Premarin) 0.625 MG/GM vaginal cream     Who prescribed you this medication: Jannette Tyler, DO     What are your concerns: PHARMACY GOT IT DOWN TO $22.00 FOR HER.  PATIENT WANTED TO THANK YOU FOR ALL YOUR HELP AND SHE IS ABLE TO AFFORD THE $22.00 PER MONTH

## 2025-02-27 NOTE — PROGRESS NOTES
Established Office Visit      Patient Name: Dixie Cervantes  : 1950   MRN: [id number]   Care Team: Patient Care Team:  Jannette Tyler DO as PCP - General (Internal Medicine)  Camden Franco MD (Neurosurgery)  Jason Pino MD as Consulting Physician (Pulmonary Disease)  Fady Lane MD as Consulting Physician (Otolaryngology)  Clive Chin (Endocrinology)    Chief Complaint:    Chief Complaint   Patient presents with    Insomnia       History of Present Illness: Dixie Cervantes is a 74 y.o. female with HTN, HLD, prediabetes, osteoporosis, insomnia who is here today to follow up with chronic medical conditions.     Prediabetes - 6.4% she admits to room for improvement in her diet. She has been stress eating lately     She is planning to have her second shingrix vaccine today     Vaginal atrophy - premarin twice weekly was very helpful but unfortunately insurance is no longer covering this. It is too costly. She is interested in alternative.     Cough variant asthma - trial of breo was helpful but she experienced side effects (stomach upset, increase in BP) and this was very costly. Interested in trying alternative.     Insomnia is relatively well controlled with ambien. Denies side effects.    Subjective      Review of Systems:   Review of Systems - See HPI    I have reviewed and the following portions of the patient's history were updated as appropriate: past family history, past medical history, past social history, past surgical history and problem list.    Medications:     Current Outpatient Medications:     albuterol sulfate  (90 Base) MCG/ACT inhaler, Inhale 2 puffs Every 4 (Four) Hours As Needed for Wheezing or Shortness of Air (coughing)., Disp: 18 g, Rfl: 2    ALPRAZolam (XANAX) 0.25 MG tablet, Take 0.5 tablets by mouth Daily As Needed for Anxiety., Disp: 15 tablet, Rfl: 2    denosumab (Prolia) 60 MG/ML solution prefilled syringe syringe, Prolia 60  "mg/mL subcutaneous syringe  60 mg subcutaneous every 6 months, Disp: , Rfl:     diclofenac (VOLTAREN) 1 % gel gel, As Needed., Disp: , Rfl:     glucosamine-chondroitin 500-400 MG capsule capsule, Take 1 capsule by mouth 3 (Three) Times a Day With Meals., Disp: , Rfl:     lidocaine (LIDODERM) 5 %, Place 1 patch on the skin as directed by provider Daily. Remove & Discard patch within 12 hours or as directed by MD, Disp: 90 patch, Rfl: 1    losartan (Cozaar) 50 MG tablet, Take 1 tablet by mouth Daily., Disp: 90 tablet, Rfl: 1    Multiple Vitamins-Minerals (MULTI VITAMIN/MINERALS) tablet, Take 1 tablet by mouth., Disp: , Rfl:     nystatin (MYCOSTATIN) 337518 UNIT/GM cream, Apply  topically to the appropriate area as directed As Needed (yeast rash)., Disp: 30 g, Rfl: 2    pantoprazole (PROTONIX) 40 MG EC tablet, Take 1 tablet by mouth 2 (Two) Times a Day. Take 1 tablet by mouth 2 (Two) Times a Day half hour before meals, Disp: 60 tablet, Rfl: 11    rosuvastatin (Crestor) 10 MG tablet, Take 1 tablet by mouth Every Night., Disp: 90 tablet, Rfl: 1    thiamine (VITAMIN B-1) 100 MG tablet  tablet, Take 1 tablet by mouth Daily., Disp: , Rfl:     zolpidem (AMBIEN) 5 MG tablet, Take 1-2 tablets qhs as needed for sleep, Disp: 50 tablet, Rfl: 0    budesonide (Pulmicort Flexhaler) 90 MCG/ACT inhaler, Inhale 1 puff 2 (Two) Times a Day., Disp: 1 each, Rfl: 11    estradiol (ESTRACE) 0.1 MG/GM vaginal cream, Insert 1 g into the vagina 2 (Two) Times a Week., Disp: 42.5 g, Rfl: 2    Allergies:   Allergies   Allergen Reactions    Celebrex [Celecoxib] Angioedema    Sulfa Antibiotics Swelling    Alendronate Sodium Hives    Ketek [Telithromycin] Other (See Comments)     Migraine    Metoclopramide Hives    Promethazine Hives    Sulfate Hives    Bupropion Irritability       Objective     Physical Exam:  Vital Signs:   Vitals:    02/27/25 0909   BP: 122/72   Pulse: 75   SpO2: 96%   Weight: 74.8 kg (165 lb)   Height: 172.7 cm (67.99\")     Body " mass index is 25.1 kg/m².     Physical Exam  Vitals reviewed.   Constitutional:       Appearance: Normal appearance.   Cardiovascular:      Rate and Rhythm: Normal rate.   Pulmonary:      Effort: Pulmonary effort is normal. No respiratory distress.   Neurological:      Mental Status: She is alert.   Psychiatric:         Mood and Affect: Mood normal.         Behavior: Behavior normal.         Judgment: Judgment normal.         Assessment / Plan      Assessment/Plan:   Problems Addressed This Visit  Diagnoses and all orders for this visit:    1. Prediabetes (Primary)  -     POC Glycosylated Hemoglobin (Hb A1C)    2. Primary hypertension  -     losartan (Cozaar) 50 MG tablet; Take 1 tablet by mouth Daily.  Dispense: 90 tablet; Refill: 1    3. Mixed hyperlipidemia  -     rosuvastatin (Crestor) 10 MG tablet; Take 1 tablet by mouth Every Night.  Dispense: 90 tablet; Refill: 1    4. Moderate persistent asthma without complication  -     budesonide (Pulmicort Flexhaler) 90 MCG/ACT inhaler; Inhale 1 puff 2 (Two) Times a Day.  Dispense: 1 each; Refill: 11    5. Primary insomnia  -     zolpidem (AMBIEN) 5 MG tablet; Take 1-2 tablets qhs as needed for sleep  Dispense: 50 tablet; Refill: 0    6. Vaginal atrophy  -     estradiol (ESTRACE) 0.1 MG/GM vaginal cream; Insert 1 g into the vagina 2 (Two) Times a Week.  Dispense: 42.5 g; Refill: 2    7. Genitourinary syndrome of menopause  -     estradiol (ESTRACE) 0.1 MG/GM vaginal cream; Insert 1 g into the vagina 2 (Two) Times a Week.  Dispense: 42.5 g; Refill: 2      Prediabetes - 6.4% today - discussed importance of dietary modifications, exercise. We discussed metformin but she is not interested in this, would like to stick to lifestyle modifications and feels she will be able to achieve this.     Vaginal atrophy - improvement with premarin but financial burden, switch to estrace cream twice weekly    HTN - well controlled with losartan 25mg daily (only taking half tablet of  losartan)     Insomnia- has been taking ambien 5-10mg qhs prn for several years. She avoids taking 2 tablets on days she needs to wake up early. CSA And UDS up to date.      HLD - well controlled with rosuvastatin 5mg weekly (only taking half tablet of rosuvastatin)     Cough variant asthma - trial of pulmicort and albuterol inhaler prn     Plan of care reviewed with patient at the conclusion of today's visit. Education was provided regarding diagnosis and management.  Patient verbalizes understanding of and agreement with management plan.    Follow Up:   Return in about 3 months (around 5/27/2025) for Recheck.        DO IZAIAH Sweet RD  [facility]  [address]  [facility] [address]  Fax [phone]  Phone [phone]

## 2025-03-27 DIAGNOSIS — F51.01 PRIMARY INSOMNIA: ICD-10-CM

## 2025-03-28 RX ORDER — ZOLPIDEM TARTRATE 5 MG/1
TABLET ORAL
Qty: 50 TABLET | Refills: 0 | Status: SHIPPED | OUTPATIENT
Start: 2025-03-28

## 2025-04-28 DIAGNOSIS — F51.01 PRIMARY INSOMNIA: ICD-10-CM

## 2025-04-28 RX ORDER — ZOLPIDEM TARTRATE 5 MG/1
TABLET ORAL
Qty: 50 TABLET | Refills: 0 | Status: SHIPPED | OUTPATIENT
Start: 2025-04-28

## 2025-04-28 NOTE — TELEPHONE ENCOUNTER
Rx Refill Note  Requested Prescriptions     Pending Prescriptions Disp Refills    zolpidem (AMBIEN) 5 MG tablet [Pharmacy Med Name: ZOLPIDEM 5MG TABLETS] 50 tablet      Sig: TAKE 1 TO 2 TABLETS BY MOUTH EVERY NIGHT AT BEDTIME AS NEEDED FOR SLEEP      Last office visit with prescribing clinician: Visit date not found   Last telemedicine visit with prescribing clinician: Visit date not found   Next office visit with prescribing clinician: Visit date not found                         Would you like a call back once the refill request has been completed: [] Yes [] No    If the office needs to give you a call back, can they leave a voicemail: [] Yes [] No    Bonita Enriquez MA  04/28/25, 10:18 EDT

## 2025-05-30 ENCOUNTER — OFFICE VISIT (OUTPATIENT)
Dept: FAMILY MEDICINE CLINIC | Facility: CLINIC | Age: 75
End: 2025-05-30
Payer: COMMERCIAL

## 2025-05-30 VITALS
BODY MASS INDEX: 24.43 KG/M2 | OXYGEN SATURATION: 99 % | WEIGHT: 161.2 LBS | DIASTOLIC BLOOD PRESSURE: 80 MMHG | SYSTOLIC BLOOD PRESSURE: 126 MMHG | HEART RATE: 89 BPM | HEIGHT: 68 IN

## 2025-05-30 DIAGNOSIS — R73.03 PREDIABETES: Primary | ICD-10-CM

## 2025-05-30 DIAGNOSIS — J45.40 MODERATE PERSISTENT ASTHMA WITHOUT COMPLICATION: ICD-10-CM

## 2025-05-30 DIAGNOSIS — F51.01 PRIMARY INSOMNIA: ICD-10-CM

## 2025-05-30 DIAGNOSIS — K21.9 GASTROESOPHAGEAL REFLUX DISEASE, UNSPECIFIED WHETHER ESOPHAGITIS PRESENT: ICD-10-CM

## 2025-05-30 DIAGNOSIS — E78.2 MIXED HYPERLIPIDEMIA: ICD-10-CM

## 2025-05-30 DIAGNOSIS — Z12.31 SCREENING MAMMOGRAM FOR BREAST CANCER: ICD-10-CM

## 2025-05-30 DIAGNOSIS — I10 PRIMARY HYPERTENSION: ICD-10-CM

## 2025-05-30 LAB
EXPIRATION DATE: ABNORMAL
HBA1C MFR BLD: 6.1 % (ref 4.5–5.7)
Lab: ABNORMAL

## 2025-05-30 RX ORDER — PANTOPRAZOLE SODIUM 40 MG/1
40 TABLET, DELAYED RELEASE ORAL 2 TIMES DAILY
Qty: 60 TABLET | Refills: 11 | Status: SHIPPED | OUTPATIENT
Start: 2025-05-30

## 2025-05-30 RX ORDER — ROSUVASTATIN CALCIUM 10 MG/1
10 TABLET, COATED ORAL NIGHTLY
Qty: 90 TABLET | Refills: 1 | Status: SHIPPED | OUTPATIENT
Start: 2025-05-30

## 2025-05-30 RX ORDER — LOSARTAN POTASSIUM 50 MG/1
50 TABLET ORAL DAILY
Qty: 90 TABLET | Refills: 1 | Status: SHIPPED | OUTPATIENT
Start: 2025-05-30

## 2025-05-30 RX ORDER — FLUTICASONE PROPIONATE AND SALMETEROL 100; 50 UG/1; UG/1
1 POWDER RESPIRATORY (INHALATION)
Qty: 60 EACH | Refills: 2 | Status: SHIPPED | OUTPATIENT
Start: 2025-05-30

## 2025-05-30 RX ORDER — ZOLPIDEM TARTRATE 5 MG/1
TABLET ORAL
Qty: 50 TABLET | Refills: 2 | Status: SHIPPED | OUTPATIENT
Start: 2025-05-30

## 2025-05-30 NOTE — PROGRESS NOTES
Established Office Visit      Patient Name: Dixie Cervantes  : 1950   MRN: [id number]   Care Team: Patient Care Team:  Jannette Tyler DO as PCP - General (Internal Medicine)  Camden Franco MD (Neurosurgery)  Jason Pino MD as Consulting Physician (Pulmonary Disease)  Fady Lane MD as Consulting Physician (Otolaryngology)  Clive Chin (Endocrinology)    Chief Complaint:    Chief Complaint   Patient presents with    Hypertension    Hyperlipidemia    Prediabetes       History of Present Illness: Dixie Cervantes is a 74 y.o. female with HTN, HLD, prediabetes, osteoporosis, insomnia who is here today to follow up with chronic medical conditions.    She reports history for vertigo, has been involved with vestibular rehab in the past, >20 years ago. She reports recent dizziness for which she has started doing these exercises again. She has noticed some improvement over the past week. She has been using her meclizine which is somewhat helpful.    She reports history of impacted cerumen in her right ear and would like this checked today.     BP well controlled today .    A1c 6.1% today which has improved from prior 6.4%. she has been trying to cut back on carbs but reports room for improvement.     Cough variant asthma - trial of breo was helpful but she experienced side effects (stomach upset, increase in BP) and this was very costly. Interested in trying alternative.                 Subjective      Review of Systems:   Review of Systems - See HPI    I have reviewed and the following portions of the patient's history were updated as appropriate: past family history, past medical history, past social history, past surgical history and problem list.    Medications:     Current Outpatient Medications:     albuterol sulfate  (90 Base) MCG/ACT inhaler, Inhale 2 puffs Every 4 (Four) Hours As Needed for Wheezing or Shortness of Air (coughing)., Disp: 18 g, Rfl: 2     ALPRAZolam (XANAX) 0.25 MG tablet, Take 0.5 tablets by mouth Daily As Needed for Anxiety., Disp: 15 tablet, Rfl: 2    denosumab (Prolia) 60 MG/ML solution prefilled syringe syringe, Prolia 60 mg/mL subcutaneous syringe  60 mg subcutaneous every 6 months, Disp: , Rfl:     diclofenac (VOLTAREN) 1 % gel gel, As Needed., Disp: , Rfl:     estradiol (ESTRACE) 0.1 MG/GM vaginal cream, Insert 1 g into the vagina 2 (Two) Times a Week., Disp: 42.5 g, Rfl: 2    glucosamine-chondroitin 500-400 MG capsule capsule, Take 1 capsule by mouth 3 (Three) Times a Day With Meals., Disp: , Rfl:     lidocaine (LIDODERM) 5 %, Place 1 patch on the skin as directed by provider Daily. Remove & Discard patch within 12 hours or as directed by MD, Disp: 90 patch, Rfl: 1    losartan (Cozaar) 50 MG tablet, Take 1 tablet by mouth Daily., Disp: 90 tablet, Rfl: 1    Multiple Vitamins-Minerals (MULTI VITAMIN/MINERALS) tablet, Take 1 tablet by mouth., Disp: , Rfl:     nystatin (MYCOSTATIN) 023750 UNIT/GM cream, Apply  topically to the appropriate area as directed As Needed (yeast rash)., Disp: 30 g, Rfl: 2    pantoprazole (PROTONIX) 40 MG EC tablet, Take 1 tablet by mouth 2 (Two) Times a Day., Disp: 60 tablet, Rfl: 11    rosuvastatin (Crestor) 10 MG tablet, Take 1 tablet by mouth Every Night., Disp: 90 tablet, Rfl: 1    thiamine (VITAMIN B-1) 100 MG tablet  tablet, Take 1 tablet by mouth Daily., Disp: , Rfl:     zolpidem (AMBIEN) 5 MG tablet, TAKE 1 TO 2 TABLETS BY MOUTH EVERY NIGHT AT BEDTIME AS NEEDED FOR SLEEP, Disp: 50 tablet, Rfl: 2    Fluticasone-Salmeterol (ADVAIR/WIXELA) 100-50 MCG/ACT DISKUS, Inhale 1 puff 2 (Two) Times a Day., Disp: 60 each, Rfl: 2    Allergies:   Allergies   Allergen Reactions    Celebrex [Celecoxib] Angioedema    Sulfa Antibiotics Swelling    Alendronate Sodium Hives    Ketek [Telithromycin] Other (See Comments)     Migraine    Metoclopramide Hives    Promethazine Hives    Sulfate Hives    Bupropion Irritability  "      Objective     Physical Exam:  Vital Signs:   Vitals:    05/30/25 1048   BP: 126/80   Pulse: 89   SpO2: 99%   Weight: 73.1 kg (161 lb 3.2 oz)   Height: 172.7 cm (67.99\")     Body mass index is 24.52 kg/m².     Physical Exam  Vitals reviewed.   Constitutional:       Appearance: Normal appearance. She is not ill-appearing.   HENT:      Right Ear: Tympanic membrane, ear canal and external ear normal.      Left Ear: Tympanic membrane, ear canal and external ear normal.   Cardiovascular:      Rate and Rhythm: Normal rate.   Pulmonary:      Effort: Pulmonary effort is normal. No respiratory distress.   Skin:     General: Skin is warm and dry.   Neurological:      Mental Status: She is alert.   Psychiatric:         Mood and Affect: Mood normal.         Behavior: Behavior normal.         Judgment: Judgment normal.                   Assessment / Plan      Assessment/Plan:   Problems Addressed This Visit  Diagnoses and all orders for this visit:    1. Prediabetes (Primary)  -     POC Glycosylated Hemoglobin (Hb A1C)    2. Primary insomnia  -     zolpidem (AMBIEN) 5 MG tablet; TAKE 1 TO 2 TABLETS BY MOUTH EVERY NIGHT AT BEDTIME AS NEEDED FOR SLEEP  Dispense: 50 tablet; Refill: 2    3. Moderate persistent asthma without complication  -     Fluticasone-Salmeterol (ADVAIR/WIXELA) 100-50 MCG/ACT DISKUS; Inhale 1 puff 2 (Two) Times a Day.  Dispense: 60 each; Refill: 2    4. Mixed hyperlipidemia  -     rosuvastatin (Crestor) 10 MG tablet; Take 1 tablet by mouth Every Night.  Dispense: 90 tablet; Refill: 1    5. Primary hypertension  -     losartan (Cozaar) 50 MG tablet; Take 1 tablet by mouth Daily.  Dispense: 90 tablet; Refill: 1    6. Gastroesophageal reflux disease, unspecified whether esophagitis present  -     pantoprazole (PROTONIX) 40 MG EC tablet; Take 1 tablet by mouth 2 (Two) Times a Day.  Dispense: 60 tablet; Refill: 11    7. Screening mammogram for breast cancer  -     Mammo Screening Digital Tomosynthesis Bilateral " With CAD; Future      Prediabetes - 6.1% today - discussed importance of dietary modifications, exercise. Continue lifestyle modifications.    Vaginal atrophy - estrace cream twice weekly    HTN - well controlled with losartan 50mg daily     Insomnia- has been taking ambien 5-10mg qhs prn for several years. She avoids taking 2 tablets on days she needs to wake up early. CSA And UDS up to date 11/2024     HLD - well controlled with rosuvastatin 5mg weekly     Cough variant asthma - imrpoved with breo but not covered by pharmacy, will try alternative Advair today.    Vertigo - continue meclizine and home exercises, specifically recommended Epley maneuver. She will consider audiology/ENT referral for diminished hearing (gradual) and ongoing vertigo if symptoms persist.               Results         Plan of care reviewed with patient at the conclusion of today's visit. Education was provided regarding diagnosis and management.  Patient verbalizes understanding of and agreement with management plan.    Follow Up:   Return in about 3 months (around 8/30/2025) for Medicare Wellness.        DO IZAIAH Sweet RD  [facility]  [address]  [facility] [address]  Fax [phone]  Phone [phone]

## 2025-06-04 ENCOUNTER — TELEPHONE (OUTPATIENT)
Dept: FAMILY MEDICINE CLINIC | Facility: CLINIC | Age: 75
End: 2025-06-04
Payer: MEDICARE

## 2025-06-04 DIAGNOSIS — J45.991 COUGH VARIANT ASTHMA: Primary | ICD-10-CM

## 2025-06-04 NOTE — TELEPHONE ENCOUNTER
Caller: Dixie Cervantes    Relationship: Self    Best call back number: [phone]     Which medication are you concerned about: Fluticasone-Salmeterol (ADVAIR/WIXELA) 100-50 MCG/ACT DISKUS     Who prescribed you this medication: DOUTHAT        What are your concerns: PATIENT STATES THE MEDICATION IS $80 AND WOULD LIKE A LESS EXPENSIVE ALTERNATIVE THAT IS JUST AS EFFECTIVE. PLEASE CALL WITH ANY QUESTIONS.PATIENT STATES THE VESTIBULAR EXERCISES ARE WORKING THAT SHE PRESCRIBED FOR DIZZINESS.

## 2025-06-05 RX ORDER — FLUTICASONE PROPIONATE 110 UG/1
1 AEROSOL, METERED RESPIRATORY (INHALATION)
Qty: 12 G | Refills: 2 | Status: SHIPPED | OUTPATIENT
Start: 2025-06-05

## 2025-06-05 NOTE — TELEPHONE ENCOUNTER
Patient was informed of changes in inhalers. She will see about the flovent first. She did not need the referral at this time.  No further questions or concerns at this time.

## 2025-06-05 NOTE — TELEPHONE ENCOUNTER
Sent in alternative inhaler, Flovent.   I received a notification that Pulmicort may be preferred with her insurance. If this is true, she may want to consider this. Previously declined this because her pharmacy did not carry this but we can send to any pharmacy.    Also - can you verify if she is still wanting the Audiology referral for hearing examination?

## 2025-08-20 ENCOUNTER — HOSPITAL ENCOUNTER (OUTPATIENT)
Dept: MAMMOGRAPHY | Facility: HOSPITAL | Age: 75
Discharge: HOME | End: 2025-08-20
Admitting: STUDENT IN AN ORGANIZED HEALTH CARE EDUCATION/TRAINING PROGRAM
Payer: MEDICARE

## 2025-08-20 DIAGNOSIS — Z12.31 SCREENING MAMMOGRAM FOR BREAST CANCER: ICD-10-CM

## 2025-08-20 PROCEDURE — 77063 BREAST TOMOSYNTHESIS BI: CPT

## 2025-08-20 PROCEDURE — 77067 SCR MAMMO BI INCL CAD: CPT

## 2025-08-21 ENCOUNTER — TELEPHONE (OUTPATIENT)
Dept: FAMILY MEDICINE CLINIC | Facility: CLINIC | Age: 75
End: 2025-08-21
Payer: MEDICARE

## 2025-08-21 DIAGNOSIS — R73.03 PREDIABETES: Primary | ICD-10-CM

## 2025-08-21 DIAGNOSIS — J45.40 MODERATE PERSISTENT ASTHMA WITHOUT COMPLICATION: ICD-10-CM

## 2025-08-21 DIAGNOSIS — M81.0 AGE RELATED OSTEOPOROSIS, UNSPECIFIED PATHOLOGICAL FRACTURE PRESENCE: ICD-10-CM

## 2025-08-21 DIAGNOSIS — E78.2 MIXED HYPERLIPIDEMIA: ICD-10-CM

## 2025-08-21 DIAGNOSIS — I10 PRIMARY HYPERTENSION: ICD-10-CM

## 2025-08-22 DIAGNOSIS — F51.01 PRIMARY INSOMNIA: ICD-10-CM

## 2025-08-22 RX ORDER — ZOLPIDEM TARTRATE 5 MG/1
TABLET ORAL
Qty: 50 TABLET | Refills: 0 | Status: SHIPPED | OUTPATIENT
Start: 2025-08-22

## 2025-08-28 ENCOUNTER — LAB (OUTPATIENT)
Dept: LAB | Facility: HOSPITAL | Age: 75
End: 2025-08-28
Payer: MEDICARE

## 2025-08-28 DIAGNOSIS — E78.2 MIXED HYPERLIPIDEMIA: ICD-10-CM

## 2025-08-28 DIAGNOSIS — J45.40 MODERATE PERSISTENT ASTHMA WITHOUT COMPLICATION: ICD-10-CM

## 2025-08-28 DIAGNOSIS — I10 PRIMARY HYPERTENSION: ICD-10-CM

## 2025-08-28 DIAGNOSIS — M81.0 AGE RELATED OSTEOPOROSIS, UNSPECIFIED PATHOLOGICAL FRACTURE PRESENCE: ICD-10-CM

## 2025-08-28 DIAGNOSIS — R73.03 PREDIABETES: ICD-10-CM

## 2025-08-28 LAB
25(OH)D3 SERPL-MCNC: 52.9 NG/ML (ref 30–100)
ALBUMIN SERPL-MCNC: 4.3 G/DL (ref 3.5–5.2)
ALBUMIN/GLOB SERPL: 1.9 G/DL
ALP SERPL-CCNC: 51 U/L (ref 39–117)
ALT SERPL W P-5'-P-CCNC: 13 U/L (ref 1–33)
ANION GAP SERPL CALCULATED.3IONS-SCNC: 14 MMOL/L (ref 5–15)
AST SERPL-CCNC: 21 U/L (ref 1–32)
BILIRUB SERPL-MCNC: 0.5 MG/DL (ref 0–1.2)
BUN SERPL-MCNC: 11 MG/DL (ref 8–23)
BUN/CREAT SERPL: 12.5 (ref 7–25)
CALCIUM SPEC-SCNC: 9.3 MG/DL (ref 8.6–10.5)
CHLORIDE SERPL-SCNC: 106 MMOL/L (ref 98–107)
CHOLEST SERPL-MCNC: 120 MG/DL (ref 0–200)
CO2 SERPL-SCNC: 22 MMOL/L (ref 22–29)
CREAT SERPL-MCNC: 0.88 MG/DL (ref 0.57–1)
DEPRECATED RDW RBC AUTO: 41.3 FL (ref 37–54)
EGFRCR SERPLBLD CKD-EPI 2021: 68.6 ML/MIN/1.73
ERYTHROCYTE [DISTWIDTH] IN BLOOD BY AUTOMATED COUNT: 12.6 % (ref 12.3–15.4)
GLOBULIN UR ELPH-MCNC: 2.3 GM/DL
GLUCOSE SERPL-MCNC: 118 MG/DL (ref 65–99)
HBA1C MFR BLD: 6.6 % (ref 4.8–5.6)
HCT VFR BLD AUTO: 42.5 % (ref 34–46.6)
HDLC SERPL-MCNC: 47 MG/DL (ref 40–60)
HGB BLD-MCNC: 14.5 G/DL (ref 12–15.9)
LDLC SERPL CALC-MCNC: 54 MG/DL (ref 0–100)
LDLC/HDLC SERPL: 1.13 {RATIO}
MCH RBC QN AUTO: 30.7 PG (ref 26.6–33)
MCHC RBC AUTO-ENTMCNC: 34.1 G/DL (ref 31.5–35.7)
MCV RBC AUTO: 90 FL (ref 79–97)
PLATELET # BLD AUTO: 248 10*3/MM3 (ref 140–450)
PMV BLD AUTO: 10.2 FL (ref 6–12)
POTASSIUM SERPL-SCNC: 4.9 MMOL/L (ref 3.5–5.2)
PROT SERPL-MCNC: 6.6 G/DL (ref 6–8.5)
RBC # BLD AUTO: 4.72 10*6/MM3 (ref 3.77–5.28)
SODIUM SERPL-SCNC: 142 MMOL/L (ref 136–145)
TRIGL SERPL-MCNC: 99 MG/DL (ref 0–150)
TSH SERPL DL<=0.05 MIU/L-ACNC: 2.6 UIU/ML (ref 0.27–4.2)
VLDLC SERPL-MCNC: 19 MG/DL (ref 5–40)
WBC NRBC COR # BLD AUTO: 8.55 10*3/MM3 (ref 3.4–10.8)

## 2025-08-28 PROCEDURE — 80061 LIPID PANEL: CPT

## 2025-08-28 PROCEDURE — 85027 COMPLETE CBC AUTOMATED: CPT

## 2025-08-28 PROCEDURE — 82306 VITAMIN D 25 HYDROXY: CPT

## 2025-08-28 PROCEDURE — 84443 ASSAY THYROID STIM HORMONE: CPT

## 2025-08-28 PROCEDURE — 80053 COMPREHEN METABOLIC PANEL: CPT

## 2025-08-28 PROCEDURE — 83036 HEMOGLOBIN GLYCOSYLATED A1C: CPT
